# Patient Record
Sex: FEMALE | Race: WHITE | Employment: OTHER | URBAN - METROPOLITAN AREA
[De-identification: names, ages, dates, MRNs, and addresses within clinical notes are randomized per-mention and may not be internally consistent; named-entity substitution may affect disease eponyms.]

---

## 2017-02-09 ENCOUNTER — OFFICE VISIT (OUTPATIENT)
Dept: INTERNAL MEDICINE CLINIC | Age: 57
End: 2017-02-09

## 2017-02-09 VITALS
BODY MASS INDEX: 50.22 KG/M2 | TEMPERATURE: 97.8 F | WEIGHT: 266 LBS | HEIGHT: 61 IN | HEART RATE: 86 BPM | OXYGEN SATURATION: 96 % | SYSTOLIC BLOOD PRESSURE: 125 MMHG | DIASTOLIC BLOOD PRESSURE: 81 MMHG

## 2017-02-09 DIAGNOSIS — E11.9 CONTROLLED TYPE 2 DIABETES MELLITUS WITHOUT COMPLICATION, WITHOUT LONG-TERM CURRENT USE OF INSULIN (HCC): Primary | ICD-10-CM

## 2017-02-09 DIAGNOSIS — Z23 ENCOUNTER FOR IMMUNIZATION: ICD-10-CM

## 2017-02-09 DIAGNOSIS — E78.00 PURE HYPERCHOLESTEROLEMIA: ICD-10-CM

## 2017-02-09 DIAGNOSIS — I10 ESSENTIAL HYPERTENSION, BENIGN: ICD-10-CM

## 2017-02-09 DIAGNOSIS — Z12.11 COLON CANCER SCREENING: ICD-10-CM

## 2017-02-09 DIAGNOSIS — K21.9 GASTROESOPHAGEAL REFLUX DISEASE, ESOPHAGITIS PRESENCE NOT SPECIFIED: ICD-10-CM

## 2017-02-09 RX ORDER — METFORMIN HYDROCHLORIDE 500 MG/1
1000 TABLET ORAL 2 TIMES DAILY WITH MEALS
Qty: 120 TAB | Refills: 6 | Status: SHIPPED | OUTPATIENT
Start: 2017-02-09 | End: 2017-09-13 | Stop reason: SDUPTHER

## 2017-02-09 RX ORDER — OMEPRAZOLE 20 MG/1
20 CAPSULE, DELAYED RELEASE ORAL DAILY
Qty: 30 CAP | Refills: 6 | Status: SHIPPED | OUTPATIENT
Start: 2017-02-09 | End: 2020-04-29

## 2017-02-09 NOTE — PROGRESS NOTES
HISTORY OF PRESENT ILLNESS  Thomas Biswas is a 64 y.o. female. HPI   F/u dm-2 htn hld  Taking metformin 500mg bid instead of tid, did not get enough pills whe rx transferred to new pharmacy  Has not been on a low carb diet  Feels well  No cp sob or sxs of neuropathy  Some fullness in chest after eating sometimes      Patient Active Problem List    Diagnosis Date Noted    Type 2 diabetes mellitus without complication ([facility] 52/89/0280    Hepatic steatosis 04/22/2015    HLD (hyperlipidemia) 06/28/2014    Goiter 06/27/2013    Diabetes mellitus ([facility] 11/03/2011    Type 1 von Willebrand disease ([facility] 02/12/2010    Essential hypertension, benign 02/12/2010    Obesity 02/12/2010    PEGGY (obstructive sleep apnea) 02/12/2010    GERD (gastroesophageal reflux disease) 02/12/2010    Venous insufficiency 02/12/2010    Nephrolithiasis 02/12/2010    Allergic rhinitis 02/12/2010    Asthma 02/12/2010    Carpal tunnel syndrome 02/12/2010    Basal cell carcinoma of nose 02/12/2010    Migraine 02/12/2010     Current Outpatient Prescriptions   Medication Sig Dispense Refill    metFORMIN (GLUCOPHAGE) 500 mg tablet Take 2 Tabs by mouth two (2) times daily (with meals). 120 Tab 6    omeprazole (PRILOSEC) 20 mg capsule Take 1 Cap by mouth daily. 30 Cap 6    pravastatin (PRAVACHOL) 10 mg tablet TAKE ONE (1) TABLET BY MOUTH DAILY 30 Tab 11    triamterene-hydrochlorothiazide (MAXZIDE) 37.5-25 mg per tablet TAKE TWO (2) TABLETS BY MOUTH ONCE DAILY 60 Tab 11    diclofenac EC (VOLTAREN) 75 mg EC tablet Take 1 Tab by mouth two (2) times a day.  30 Tab 0    glucose blood VI test strips (FREESTYLE TEST) strip Freestyle freedom lite test trips  -check fsbs every day  250.00 100 Strip 11    Blood-Glucose Meter monitoring kit Use as directed 1 Kit 0    ALPRAZolam (XANAX) 0.25 mg tablet TAKE  ONE (1)  TABLET(S)  TWICE DAILY AS   NEEDED FORANXIETY 30 Tab 3    albuterol (PROVENTIL HFA, VENTOLIN HFA, PROAIR HFA) 90 mcg/actuation inhaler Take 2 Puffs by inhalation every six (6) hours as needed for Wheezing. 1 Inhaler 0     Allergies   Allergen Reactions    Pcn [Penicillins] Unknown (comments)     Given as a child and can't remember reaction      Lab Results  Component Value Date/Time   Hemoglobin A1c 8.0 01/11/2016 09:29 AM   Hemoglobin A1c 7.6 04/10/2015 09:28 AM   Hemoglobin A1c 6.5 06/27/2014 03:34 PM   Glucose 105 01/11/2016 09:29 AM   Glucose (POC) 112 02/03/2013 07:15 AM   Microalb/Creat ratio (ug/mg creat.) <6.8 05/09/2016 09:55 AM   LDL, calculated 78 01/11/2016 09:29 AM   Creatinine 0.87 01/11/2016 09:29 AM      Lab Results  Component Value Date/Time   Cholesterol, total 171 01/11/2016 09:29 AM   HDL Cholesterol 59 01/11/2016 09:29 AM   LDL, calculated 78 01/11/2016 09:29 AM   Triglyceride 171 01/11/2016 09:29 AM       Lab Results  Component Value Date/Time   GFR est AA 87 01/11/2016 09:29 AM   GFR est non-AA 75 01/11/2016 09:29 AM   Creatinine 0.87 01/11/2016 09:29 AM   BUN 19 01/11/2016 09:29 AM   Sodium 139 01/11/2016 09:29 AM   Potassium 4.2 01/11/2016 09:29 AM   Chloride 92 01/11/2016 09:29 AM   CO2 29 01/11/2016 09:29 AM         ROS    Physical Exam   Constitutional: She appears well-developed and well-nourished. Appears stated age   Cardiovascular: Normal rate, regular rhythm and normal heart sounds. Exam reveals no gallop and no friction rub. No murmur heard. Pulmonary/Chest: Effort normal and breath sounds normal. No respiratory distress. She has no wheezes. Abdominal: Soft. Bowel sounds are normal.   Musculoskeletal: She exhibits no edema. Neurological: She is alert. Psychiatric: She has a normal mood and affect. Nursing note and vitals reviewed. ASSESSMENT and PLAN  Mayco Hernández was seen today for diabetes, cholesterol problem and hypertension.     Diagnoses and all orders for this visit:    Controlled type 2 diabetes mellitus without complication, without long-term current use of insulin (HCC)  -     CBC W/O DIFF  -     METABOLIC PANEL, COMPREHENSIVE  -     HEMOGLOBIN A1C WITH EAG   Plan on increasing metformin to 2 tabs bid   Work on diet   May need to add dpp4 or glp-1 soon-discussed    Essential hypertension, benign  -     CBC W/O DIFF  -     METABOLIC PANEL, COMPREHENSIVE   controlled    Pure hypercholesterolemia  -     CBC W/O DIFF  -     METABOLIC PANEL, COMPREHENSIVE  -     LIPID PANEL   Continue statin    Encounter for immunization  -     Influenza virus vaccine (QUADRIVALENT PRES FREE SYRINGE) IM 3 years and older    Colon cancer screening  -     REFERRAL TO GASTROENTEROLOGY    Gastroesophageal reflux disease, esophagitis presence not specified   Start prilosec 20 mg every day, mild dysphagia sxs    Other orders  -     metFORMIN (GLUCOPHAGE) 500 mg tablet; Take 2 Tabs by mouth two (2) times daily (with meals). -     omeprazole (PRILOSEC) 20 mg capsule; Take 1 Cap by mouth daily. Follow-up Disposition:  Return in about 4 months (around 6/9/2017) for dm-2 htn hld.

## 2017-02-09 NOTE — MR AVS SNAPSHOT
Visit Information Date & Time Provider Department Dept. Phone Encounter #  
 2/9/2017  9:30 AM Zander Armstrong, [address] [facility] [address] [phone] [id number] Follow-up Instructions Return in about 4 months (around 6/9/2017) for dm-2 htn hld. Upcoming Health Maintenance Date Due Pneumococcal 19-64 Medium Risk (1 of 1 - PPSV23) 9/30/1979 PAP AKA CERVICAL CYTOLOGY 9/30/1981 FOBT Q 1 YEAR AGE 50-75 9/30/2010 INFLUENZA AGE 9 TO ADULT 8/1/2016 LIPID PANEL Q1 1/11/2017 HEMOGLOBIN A1C Q6M 2/16/2017 EYE EXAM RETINAL OR DILATED Q1 4/6/2017 FOOT EXAM Q1 5/9/2017 MICROALBUMIN Q1 5/9/2017 BREAST CANCER SCRN MAMMOGRAM 7/28/2017 DTaP/Tdap/Td series (2 - Td) 8/26/2025 Allergies as of 2/9/2017  Review Complete On: 2/9/2017 By: Patience Fischer LPN Severity Noted Reaction Type Reactions Pcn [Penicillins]  02/12/2010    Unknown (comments) Given as a child and can't remember reaction Current Immunizations  Reviewed on 2/4/2013 Name Date Influenza Vaccine 1/7/2014 Influenza Vaccine (Quad) PF  Incomplete Influenza Vaccine PF 12/29/2014 Tdap 8/26/2015 Not reviewed this visit You Were Diagnosed With   
  
 Codes Comments Controlled type 2 diabetes mellitus without complication, without long-term current use of insulin ([facility]    -  Primary ICD-10-CM: E11.9 ICD-9-CM: 250.00 Essential hypertension, benign     ICD-10-CM: I10 
ICD-9-CM: 401.1 Pure hypercholesterolemia     ICD-10-CM: E78.00 ICD-9-CM: 272.0 Encounter for immunization     ICD-10-CM: S62 ICD-9-CM: V03.89 Colon cancer screening     ICD-10-CM: Z12.11 ICD-9-CM: V76.51 Vitals BP Pulse Temp Height(growth percentile) Weight(growth percentile) SpO2  
 125/81 (BP 1 Location: Left arm, BP Patient Position: Sitting) 86 97.8 °F (36.6 °C) (Oral) 5' 1\" (1.549 m) 266 lb (120.7 kg) 96% BMI OB Status Smoking Status 50.26 kg/m2 Hysterectomy Never Smoker BMI and BSA Data Body Mass Index Body Surface Area  
 50.26 kg/m 2 2.28 m 2 Preferred Pharmacy Pharmacy Name Phone [address] [address] AT [place] [address] [phone] Your Updated Medication List  
  
   
This list is accurate as of: 2/9/17 10:18 AM.  Always use your most recent med list.  
  
  
  
  
 albuterol 90 mcg/actuation inhaler Commonly known as:  PROVENTIL HFA, VENTOLIN HFA, PROAIR HFA Take 2 Puffs by inhalation every six (6) hours as needed for Wheezing. ALPRAZolam 0.25 mg tablet Commonly known as:  XANAX  
TAKE  ONE (1)  TABLET(S)  TWICE DAILY AS   NEEDED [employer] Blood-Glucose Meter monitoring kit Use as directed  
  
 diclofenac EC 75 mg EC tablet Commonly known as:  VOLTAREN Take 1 Tab by mouth two (2) times a day. glucose blood VI test strips strip Commonly known as:  FREESTYLE TEST Freestyle freedom lite test trips  -check fsbs every day  250.00  
  
 metFORMIN 500 mg tablet Commonly known as:  GLUCOPHAGE Take 2 Tabs by mouth two (2) times daily (with meals). pravastatin 10 mg tablet Commonly known as:  PRAVACHOL  
TAKE ONE (1) TABLET BY MOUTH DAILY  
  
 triamterene-hydroCHLOROthiazide 37.5-25 mg per tablet Commonly known as:  Anuj Beady TAKE TWO (2) TABLETS BY MOUTH ONCE DAILY Prescriptions Sent to Pharmacy Refills  
 metFORMIN (GLUCOPHAGE) 500 mg tablet 6 Sig: Take 2 Tabs by mouth two (2) times daily (with meals). Class: Normal  
 Pharmacy: [facility] [address] [place] [address] AT [place] [address] Ph #: [phone] Route: Oral  
  
We Performed the Following CBC W/O DIFF [69345 CPT(R)] HEMOGLOBIN A1C WITH EAG [58553 CPT(R)] INFLUENZA VIRUS VAC QUAD,SPLIT,PRESV FREE SYRINGE 3/> YRS IM [id number] CPT(R)] LIPID PANEL [24484 CPT(R)] METABOLIC PANEL, COMPREHENSIVE [11272 CPT(R)] REFERRAL TO GASTROENTEROLOGY [[id number] Custom] Comments:  
 Please evaluate patient for screening colonoscopy Follow-up Instructions Return in about 4 months (around 6/9/2017) for dm-2 htn hld. Referral Information Referral ID Referred By Referred To  
  
 [id number] Maida SUAZO[address]   
   [address] [facility] [address], [address] Visits Status Start Date End Date 1 New Request 2/9/17 2/9/18 If your referral has a status of pending review or denied, additional information will be sent to support the outcome of this decision. Introducing [facility]! Dear Soni Daly: 
Thank you for requesting a [employer] account. Our records indicate that you have previously registered for a Medicast account but its currently inactive. Please call our Medicast support line at [phone]. Additional Information If you have questions, please visit the Frequently Asked Questions section of the [employer]ast website at [email or web address] Remember, Medicast is NOT to be used for urgent needs. For medical emergencies, dial 911. Now available from your iPhone and Android! Please provide this summary of care documentation to your next provider. Your primary care clinician is listed as Bert SUAZO. If you have any questions after today's visit, please call [phone].

## 2017-02-10 LAB
ALBUMIN SERPL-MCNC: 4.1 G/DL (ref 3.5–5.5)
ALBUMIN/GLOB SERPL: 1.5 {RATIO} (ref 1.1–2.5)
ALP SERPL-CCNC: 71 IU/L (ref 39–117)
ALT SERPL-CCNC: 25 IU/L (ref 0–32)
AST SERPL-CCNC: 26 IU/L (ref 0–40)
BILIRUB SERPL-MCNC: 0.4 MG/DL (ref 0–1.2)
BUN SERPL-MCNC: 13 MG/DL (ref 6–24)
BUN/CREAT SERPL: 16 (ref 9–23)
CALCIUM SERPL-MCNC: 9.3 MG/DL (ref 8.7–10.2)
CHLORIDE SERPL-SCNC: 93 MMOL/L (ref 96–106)
CHOLEST SERPL-MCNC: 158 MG/DL (ref 100–199)
CO2 SERPL-SCNC: 30 MMOL/L (ref 18–29)
CREAT SERPL-MCNC: 0.82 MG/DL (ref 0.57–1)
ERYTHROCYTE [DISTWIDTH] IN BLOOD BY AUTOMATED COUNT: 13.7 % (ref 12.3–15.4)
EST. AVERAGE GLUCOSE BLD GHB EST-MCNC: 186 MG/DL
GLOBULIN SER CALC-MCNC: 2.7 G/DL (ref 1.5–4.5)
GLUCOSE SERPL-MCNC: 146 MG/DL (ref 65–99)
HBA1C MFR BLD: 8.1 % (ref 4.8–5.6)
HCT VFR BLD AUTO: 45.8 % (ref 34–46.6)
HDLC SERPL-MCNC: 45 MG/DL
HGB BLD-MCNC: 14.5 G/DL (ref 11.1–15.9)
LDLC SERPL CALC-MCNC: 79 MG/DL (ref 0–99)
MCH RBC QN AUTO: 25.4 PG (ref 26.6–33)
MCHC RBC AUTO-ENTMCNC: 31.7 G/DL (ref 31.5–35.7)
MCV RBC AUTO: 80 FL (ref 79–97)
PLATELET # BLD AUTO: 272 X10E3/UL (ref 150–379)
POTASSIUM SERPL-SCNC: 3.8 MMOL/L (ref 3.5–5.2)
PROT SERPL-MCNC: 6.8 G/DL (ref 6–8.5)
RBC # BLD AUTO: 5.7 X10E6/UL (ref 3.77–5.28)
SODIUM SERPL-SCNC: 140 MMOL/L (ref 134–144)
TRIGL SERPL-MCNC: 170 MG/DL (ref 0–149)
VLDLC SERPL CALC-MCNC: 34 MG/DL (ref 5–40)
WBC # BLD AUTO: 6.9 X10E3/UL (ref 3.4–10.8)

## 2017-03-03 ENCOUNTER — HOSPITAL ENCOUNTER (EMERGENCY)
Age: 57
Discharge: HOME | End: 2017-03-03
Attending: FAMILY MEDICINE

## 2017-03-03 VITALS
HEIGHT: 61 IN | TEMPERATURE: 98.7 F | DIASTOLIC BLOOD PRESSURE: 79 MMHG | HEART RATE: 88 BPM | BODY MASS INDEX: 50.22 KG/M2 | OXYGEN SATURATION: 97 % | RESPIRATION RATE: 20 BRPM | SYSTOLIC BLOOD PRESSURE: 164 MMHG | WEIGHT: 266 LBS

## 2017-03-03 DIAGNOSIS — J20.9 ACUTE BRONCHITIS, UNSPECIFIED ORGANISM: Primary | ICD-10-CM

## 2017-03-03 RX ORDER — AZITHROMYCIN 250 MG/1
TABLET, FILM COATED ORAL
Qty: 6 TAB | Refills: 0 | Status: SHIPPED | OUTPATIENT
Start: 2017-03-03 | End: 2018-01-09 | Stop reason: ALTCHOICE

## 2017-03-03 RX ORDER — CODEINE PHOSPHATE AND GUAIFENESIN 10; 100 MG/5ML; MG/5ML
5 SOLUTION ORAL
Qty: 120 ML | Refills: 0 | Status: SHIPPED | OUTPATIENT
Start: 2017-03-03 | End: 2017-06-09

## 2017-03-03 RX ORDER — ALBUTEROL SULFATE 90 UG/1
2 AEROSOL, METERED RESPIRATORY (INHALATION)
Qty: 1 INHALER | Refills: 0 | Status: SHIPPED | OUTPATIENT
Start: 2017-03-03 | End: 2019-02-22

## 2017-03-03 NOTE — DISCHARGE INSTRUCTIONS
Bronchitis: Care Instructions  Your Care Instructions    Bronchitis is inflammation of the bronchial tubes, which carry air to the lungs. The tubes swell and produce mucus, or phlegm. The mucus and inflamed bronchial tubes make you cough. You may have trouble breathing. Most cases of bronchitis are caused by viruses like those that cause colds. Antibiotics usually do not help and they may be harmful. Bronchitis usually develops rapidly and lasts about 2 to 3 weeks in otherwise healthy people. Follow-up care is a key part of your treatment and safety. Be sure to make and go to all appointments, and call your doctor if you are having problems. It's also a good idea to know your test results and keep a list of the medicines you take. How can you care for yourself at home? · Take all medicines exactly as prescribed. Call your doctor if you think you are having a problem with your medicine. · Get some extra rest.  · Take an over-the-counter pain medicine, such as acetaminophen (Tylenol), ibuprofen (Advil, Motrin), or naproxen (Aleve) to reduce fever and relieve body aches. Read and follow all instructions on the label. · Do not take two or more pain medicines at the same time unless the doctor told you to. Many pain medicines have acetaminophen, which is Tylenol. Too much acetaminophen (Tylenol) can be harmful. · Take an over-the-counter cough medicine that contains dextromethorphan to help quiet a dry, hacking cough so that you can sleep. Avoid cough medicines that have more than one active ingredient. Read and follow all instructions on the label. · Breathe moist air from a humidifier, hot shower, or sink filled with hot water. The heat and moisture will thin mucus so you can cough it out. · Do not smoke. Smoking can make bronchitis worse. If you need help quitting, talk to your doctor about stop-smoking programs and medicines. These can increase your chances of quitting for good.   When should you call for help? Call 911 anytime you think you may need emergency care. For example, call if:  · You have severe trouble breathing. Call your doctor now or seek immediate medical care if:  · You have new or worse trouble breathing. · You cough up dark brown or bloody mucus (sputum). · You have a new or higher fever. · You have a new rash. Watch closely for changes in your health, and be sure to contact your doctor if:  · You cough more deeply or more often, especially if you notice more mucus or a change in the color of your mucus. · You are not getting better as expected. Where can you learn more? Go to http://dillon-champ.info/. Enter H333 in the search box to learn more about \"Bronchitis: Care Instructions. \"  Current as of: May 23, 2016  Content Version: 11.1  © 3624-4399 [employer]. Care instructions adapted under license by [employer] (which disclaims liability or warranty for this information). If you have questions about a medical condition or this instruction, always ask your healthcare professional. [address] any warranty or liability for your use of this information.

## 2017-03-03 NOTE — UC PROVIDER NOTE
Patient is a 64 y.o. female presenting with cough and nasal congestion. The history is provided by the patient. Cough   This is a new problem. The current episode started more than 1 week ago. The problem occurs every few minutes. The problem has been gradually worsening. The cough is productive of sputum. There has been no fever. Pertinent negatives include no chest pain, no chills, no sweats and no headaches. She has tried nothing for the symptoms. She is not a smoker. Nasal Congestion   This is a new problem. The current episode started more than 1 week ago. The problem occurs daily. The problem has not changed since onset. Pertinent negatives include no chest pain and no headaches. Nothing aggravates the symptoms. Nothing relieves the symptoms. Past Medical History:   Diagnosis Date    Arrhythmia     heart murmur    Chronic pain     Contact dermatitis and other eczema, due to unspecified cause     Diabetes (HCC)     GERD (gastroesophageal reflux disease)     Hypertension     Joint pain     & muscle aches    Sleep apnea     zapata not use c-pap        Past Surgical History:   Procedure Laterality Date    BREAST SURGERY PROCEDURE UNLISTED  1984    breast reduction    CHEST SURGERY PROCEDURE UNLISTED  1/29/13     THYROIDECTOMY Retrosternal and Retromandibular!  HX CARPAL TUNNEL RELEASE      HX HYSTERECTOMY  2002?  HX ORTHOPAEDIC  2008, 2009    carpal tunnel    HX ORTHOPAEDIC  2010    torn meniscus    HX THYROIDECTOMY  01/2013    right side    MO MOHS SURG, ADDL BLOCK      basal cell on nose         Family History   Problem Relation Age of Onset    Hypertension Father     Stroke Father         Social History     Social History    Marital status:      Spouse name: N/A    Number of children: N/A    Years of education: N/A     Occupational History    Not on file.      Social History Main Topics    Smoking status: Never Smoker    Smokeless tobacco: Never Used    Alcohol use 1.0 oz/week     1 Glasses of wine, 1 Cans of beer per week      Comment: rarely, 3x a month    Drug use: No    Sexual activity: Not on file     Other Topics Concern    Not on file     Social History Narrative                ALLERGIES: Pcn [penicillins]    Review of Systems   Constitutional: Negative for chills. Respiratory: Positive for cough. Cardiovascular: Negative for chest pain. Neurological: Negative for headaches. Vitals:    03/03/17 1440   BP: 164/79   Pulse: 88   Resp: 20   Temp: 98.7 °F (37.1 °C)   SpO2: 97%   Weight: 120.7 kg (266 lb)   Height: 5' 1\" (1.549 m)       Physical Exam   Constitutional: She is oriented to person, place, and time. She appears well-developed and well-nourished. HENT:   Right Ear: External ear normal.   Left Ear: External ear normal.   Eyes: EOM are normal.   Neck: Normal range of motion. Neck supple. No JVD present. No tracheal deviation present. No thyromegaly present. Cardiovascular: Normal rate, regular rhythm and normal heart sounds. Pulmonary/Chest: Effort normal and breath sounds normal.   Lymphadenopathy:     She has no cervical adenopathy. Neurological: She is alert and oriented to person, place, and time. Skin: Skin is warm and dry. Psychiatric: She has a normal mood and affect. Her behavior is normal. Judgment and thought content normal.   Nursing note and vitals reviewed. MDM     Differential Diagnosis; Clinical Impression; Plan:     CLINICAL IMPRESSION:  Acute bronchitis, unspecified organism  (primary encounter diagnosis)    Plan:  1. Robitussin AC  2. Zithromax  3. Albuterol MDI  Risk of Significant Complications, Morbidity, and/or Mortality:   Presenting problems: Moderate  Diagnostic procedures: Moderate  Management options:   Moderate  Progress:   Patient progress:  Stable      Procedures

## 2017-06-09 ENCOUNTER — OFFICE VISIT (OUTPATIENT)
Dept: INTERNAL MEDICINE CLINIC | Age: 57
End: 2017-06-09

## 2017-06-09 VITALS
TEMPERATURE: 97.1 F | SYSTOLIC BLOOD PRESSURE: 122 MMHG | DIASTOLIC BLOOD PRESSURE: 79 MMHG | HEIGHT: 61 IN | HEART RATE: 81 BPM | BODY MASS INDEX: 49.61 KG/M2 | OXYGEN SATURATION: 94 % | WEIGHT: 262.8 LBS | RESPIRATION RATE: 16 BRPM

## 2017-06-09 DIAGNOSIS — E11.9 TYPE 2 DIABETES MELLITUS WITHOUT COMPLICATION, WITHOUT LONG-TERM CURRENT USE OF INSULIN (HCC): Primary | ICD-10-CM

## 2017-06-09 DIAGNOSIS — Z23 ENCOUNTER FOR IMMUNIZATION: ICD-10-CM

## 2017-06-09 LAB — HBA1C MFR BLD HPLC: 7.3 % (ref 4.8–5.6)

## 2017-06-09 RX ORDER — CLOBETASOL PROPIONATE 0.5 MG/G
AEROSOL, FOAM TOPICAL
Refills: 3 | COMMUNITY
Start: 2017-05-12 | End: 2020-04-29

## 2017-06-09 NOTE — MR AVS SNAPSHOT
Visit Information Date & Time Provider Department Dept. Phone Encounter #  
 6/9/2017  8:45 AM Lillie Henning, [address] [facility] [address] [phone] [id number] Follow-up Instructions Return in about 4 months (around 10/9/2017) for dm-2 htn hld. Upcoming Health Maintenance Date Due Pneumococcal 19-64 Medium Risk (1 of 1 - PPSV23) 9/30/1979 PAP AKA CERVICAL CYTOLOGY 9/30/1981 FOBT Q 1 YEAR AGE 50-75 9/30/2010 EYE EXAM RETINAL OR DILATED Q1 4/6/2017 FOOT EXAM Q1 5/9/2017 MICROALBUMIN Q1 5/9/2017 BREAST CANCER SCRN MAMMOGRAM 7/28/2017 INFLUENZA AGE 9 TO ADULT 8/1/2017 HEMOGLOBIN A1C Q6M 8/9/2017 LIPID PANEL Q1 2/9/2018 DTaP/Tdap/Td series (2 - Td) 8/26/2025 Allergies as of 6/9/2017  Review Complete On: 6/9/2017 By: Favian Crocker LPN Severity Noted Reaction Type Reactions Pcn [Penicillins]  02/12/2010    Unknown (comments) Given as a child and can't remember reaction Current Immunizations  Reviewed on 2/4/2013 Name Date Influenza Vaccine 1/7/2014 Influenza Vaccine (Quad) PF 2/9/2017 Influenza Vaccine PF 12/29/2014 Pneumococcal Polysaccharide (PPSV-23)  Incomplete Tdap 8/26/2015 Not reviewed this visit You Were Diagnosed With   
  
 Codes Comments Type 2 diabetes mellitus without complication, without long-term current use of insulin (HCC)    -  Primary ICD-10-CM: E11.9 ICD-9-CM: 250.00 Encounter for immunization     ICD-10-CM: T83 ICD-9-CM: V03.89 Vitals BP Pulse Temp Resp Height(growth percentile) Weight(growth percentile) 122/79 (BP 1 Location: Left arm, BP Patient Position: Sitting) 81 97.1 °F (36.2 °C) (Oral) 16 5' 1\" (1.549 m) 262 lb 12.8 oz (119.2 kg) SpO2 BMI OB Status Smoking Status 94% 49.66 kg/m2 Hysterectomy Never Smoker BMI and BSA Data Body Mass Index Body Surface Area  
 49.66 kg/m 2 2.27 m 2 Preferred Pharmacy Pharmacy Name Phone [address] [place] [address] [phone] Your Updated Medication List  
  
   
This list is accurate as of: 6/9/17  9:28 AM.  Always use your most recent med list.  
  
  
  
  
 * albuterol 90 mcg/actuation inhaler Commonly known as:  PROVENTIL HFA, VENTOLIN HFA, PROAIR HFA Take 2 Puffs by inhalation every six (6) hours as needed for Wheezing. * albuterol 90 mcg/actuation inhaler Commonly known as:  PROVENTIL HFA, VENTOLIN HFA, PROAIR HFA Take 2 Puffs by inhalation every four (4) hours as needed for Wheezing. ALPRAZolam 0.25 mg tablet Commonly known as:  XANAX  
TAKE  ONE (1)  TABLET(S)  TWICE DAILY AS   NEEDED FORANXIETY  
  
 azithromycin 250 mg tablet Commonly known as:  Edy Wilkinsonson Take 2 now, then 1 daily for 4 days Blood-Glucose Meter monitoring kit Use as directed  
  
 clobetasol 0.05 % topical foam  
Commonly known as:  OLUX  
BESSY AA PRN UTD  
  
 glucose blood VI test strips strip Commonly known as:  FREESTYLE TEST Freestyle freedom lite test trips  -check fsbs every day  250.00  
  
 metFORMIN 500 mg tablet Commonly known as:  GLUCOPHAGE Take 2 Tabs by mouth two (2) times daily (with meals). omeprazole 20 mg capsule Commonly known as:  PRILOSEC Take 1 Cap by mouth daily. pravastatin 10 mg tablet Commonly known as:  PRAVACHOL  
TAKE ONE (1) TABLET BY MOUTH DAILY  
  
 triamterene-hydroCHLOROthiazide 37.5-25 mg per tablet Commonly known as:  Kaykay Penny TAKE TWO (2) TABLETS BY MOUTH ONCE DAILY * Notice: This list has 2 medication(s) that are the same as other medications prescribed for you. Read the directions carefully, and ask your doctor or other care provider to review them with you. We Performed the Following AMB POC HEMOGLOBIN A1C [77880 CPT(R)] MICROALBUMIN, UR, RAND W/ MICROALBUMIN/CREA RATIO [id number] CPT(R)] PNEUMOCOCCAL POLYSACCHARIDE VACCINE, 23-VALENT, ADULT OR IMMUNOSUPPRESSED PT DOSE, [89088 CPT(R)] Follow-up Instructions Return in about 4 months (around 10/9/2017) for dm-2 htn hld. Introducing [facility] & HEALTH SERVICES! Dear Deborah Mccullough: 
Thank you for requesting a Share Your [employer] account. Our records indicate that you have previously registered for a Share Your Brain account but its currently inactive. Please call our Share Your Brain support line at [phone]. Additional Information If you have questions, please visit the Frequently Asked Questions section of the Share Your Brain website at [email or web address] Remember, Share Your Brain is NOT to be used for urgent needs. For medical emergencies, dial 911. Now available from your iPhone and Android! Please provide this summary of care documentation to your next provider. Your primary care clinician is listed as Staci SUAZO. If you have any questions after today's visit, please call [phone].

## 2017-06-09 NOTE — PROGRESS NOTES
Chief Complaint   Patient presents with    Diabetes     4 month follow up . Pt fasting    Hypertension    Cholesterol Problem     Reviewed record In preparation for visit and have obtained necessary documentation    1. Have you been to the ER, urgent care clinic since your last visit? Hospitalized since your last visit? NO    2. Have you seen or consulted any other health care providers outside of the [facility] [place] since your last visit? Include any pap smears or colon screening. NO    Patient does not have advance directive on file and has received paperwork.

## 2017-06-09 NOTE — PROGRESS NOTES
HISTORY OF PRESENT ILLNESS  Shaina Chatman is a 64 y.o. female. HPI     F/u DM-2 HTN HLD  Last a1c was 8.1, metformin was increased to 2 bid  See ophthalmologist Dr Rojelio Wade at [facility] for eye exams  Had to get screening colonoscopy rescheduled due to flu like sxs  noty on aspirin due to Ilean Mings  No fsbs at home but has glucometer  Has not been strict with diet last 3 weeks  No ss of cp sob or neuropathy in feet  Patient Active Problem List    Diagnosis Date Noted    Type 2 diabetes mellitus without complication ([facility] [address] 88/50/5951    Hepatic steatosis 04/22/2015    HLD (hyperlipidemia) 06/28/2014    Goiter 06/27/2013    Diabetes mellitus ([facility] [address] 11/03/2011    Type 1 von Willebrand disease ([facility] [address] 02/12/2010    Essential hypertension, benign 02/12/2010    Obesity 02/12/2010    PEGGY (obstructive sleep apnea) 02/12/2010    GERD (gastroesophageal reflux disease) 02/12/2010    Venous insufficiency 02/12/2010    Nephrolithiasis 02/12/2010    Allergic rhinitis 02/12/2010    Asthma 02/12/2010    Carpal tunnel syndrome 02/12/2010    Basal cell carcinoma of nose 02/12/2010    Migraine 02/12/2010     Current Outpatient Prescriptions   Medication Sig Dispense Refill    clobetasol (OLUX) 0.05 % topical foam BESSY AA PRN UTD  3    metFORMIN (GLUCOPHAGE) 500 mg tablet Take 2 Tabs by mouth two (2) times daily (with meals). 120 Tab 6    omeprazole (PRILOSEC) 20 mg capsule Take 1 Cap by mouth daily. 30 Cap 6    pravastatin (PRAVACHOL) 10 mg tablet TAKE ONE (1) TABLET BY MOUTH DAILY 30 Tab 11    triamterene-hydrochlorothiazide (MAXZIDE) 37.5-25 mg per tablet TAKE TWO (2) TABLETS BY MOUTH ONCE DAILY 60 Tab 11    Blood-Glucose Meter monitoring kit Use as directed 1 Kit 0    albuterol (PROVENTIL HFA, VENTOLIN HFA, PROAIR HFA) 90 mcg/actuation inhaler Take 2 Puffs by inhalation every four (4) hours as needed for Wheezing.  1 Inhaler 0    azithromycin (ZITHROMAX Z-ESE) 250 mg tablet Take 2 now, then 1 daily for 4 days 6 Tab 0    ALPRAZolam (XANAX) 0.25 mg tablet TAKE  ONE (1)  TABLET(S)  TWICE DAILY AS   NEEDED FORANXIETY 30 Tab 3    glucose blood VI test strips (FREESTYLE TEST) strip Freestyle freedom lite test trips  -check fsbs every day  250.00 100 Strip 11    albuterol (PROVENTIL HFA, VENTOLIN HFA, PROAIR HFA) 90 mcg/actuation inhaler Take 2 Puffs by inhalation every six (6) hours as needed for Wheezing. 1 Inhaler 0     Allergies   Allergen Reactions    Pcn [Penicillins] Unknown (comments)     Given as a child and can't remember reaction      Lab Results  Component Value Date/Time   Hemoglobin A1c 8.1 02/09/2017 10:33 AM   Hemoglobin A1c 8.0 01/11/2016 09:29 AM   Hemoglobin A1c 7.6 04/10/2015 09:28 AM   Glucose 146 02/09/2017 10:33 AM   Glucose (POC) 112 02/03/2013 07:15 AM   Microalb/Creat ratio (ug/mg creat.) <6.8 05/09/2016 09:55 AM   LDL, calculated 79 02/09/2017 10:33 AM   Creatinine 0.82 02/09/2017 10:33 AM      Lab Results  Component Value Date/Time   Cholesterol, total 158 02/09/2017 10:33 AM   HDL Cholesterol 45 02/09/2017 10:33 AM   LDL, calculated 79 02/09/2017 10:33 AM   Triglyceride 170 02/09/2017 10:33 AM       Lab Results  Component Value Date/Time   GFR est AA 93 02/09/2017 10:33 AM   GFR est non-AA 80 02/09/2017 10:33 AM   Creatinine 0.82 02/09/2017 10:33 AM   BUN 13 02/09/2017 10:33 AM   Sodium 140 02/09/2017 10:33 AM   Potassium 3.8 02/09/2017 10:33 AM   Chloride 93 02/09/2017 10:33 AM   CO2 30 02/09/2017 10:33 AM         ROS    Physical Exam   Constitutional: She appears well-developed and well-nourished. Appears stated age   Cardiovascular: Normal rate, regular rhythm and normal heart sounds. Exam reveals no gallop and no friction rub. No murmur heard. Pulmonary/Chest: Effort normal and breath sounds normal. No respiratory distress. She has no wheezes. Abdominal: Soft. Bowel sounds are normal.   Musculoskeletal: She exhibits no edema. Neurological: She is alert.    Psychiatric: She has a normal mood and affect. Nursing note and vitals reviewed. ASSESSMENT and PLAN  Per Ronquillo was seen today for diabetes, hypertension and cholesterol problem. Diagnoses and all orders for this visit:    Type 2 diabetes mellitus without complication, without long-term current use of insulin (HCC)  -     AMB POC HEMOGLOBIN A1C 7.3  -     MICROALBUMIN, UR, RAND W/ MICROALBUMIN/CREA RATIO   Pt declines DPP4 of SGLT2, prefers to make further diet restrictions   Advised [facility] exam at [facility]  HTN   controlled  Encounter for immunization  -     Pneumococcal polysaccharide vaccine, 23-valent, adult or immunosuppressed pt dose      Follow-up Disposition:  Return in about 4 months (around 10/9/2017) for dm-2 htn hld.

## 2017-06-10 LAB
ALBUMIN/CREAT UR: 3.9 MG/G CREAT (ref 0–30)
CREAT UR-MCNC: 162.1 MG/DL
MICROALBUMIN UR-MCNC: 6.3 UG/ML

## 2018-01-09 ENCOUNTER — OFFICE VISIT (OUTPATIENT)
Dept: INTERNAL MEDICINE CLINIC | Age: 58
End: 2018-01-09

## 2018-01-09 ENCOUNTER — DOCUMENTATION ONLY (OUTPATIENT)
Dept: INTERNAL MEDICINE CLINIC | Age: 58
End: 2018-01-09

## 2018-01-09 VITALS
BODY MASS INDEX: 49.47 KG/M2 | TEMPERATURE: 98.1 F | HEART RATE: 80 BPM | WEIGHT: 262 LBS | DIASTOLIC BLOOD PRESSURE: 81 MMHG | SYSTOLIC BLOOD PRESSURE: 126 MMHG | OXYGEN SATURATION: 96 % | HEIGHT: 61 IN

## 2018-01-09 DIAGNOSIS — E78.00 PURE HYPERCHOLESTEROLEMIA: ICD-10-CM

## 2018-01-09 DIAGNOSIS — I10 ESSENTIAL HYPERTENSION, BENIGN: ICD-10-CM

## 2018-01-09 DIAGNOSIS — Z12.11 COLON CANCER SCREENING: ICD-10-CM

## 2018-01-09 DIAGNOSIS — Z23 ENCOUNTER FOR IMMUNIZATION: ICD-10-CM

## 2018-01-09 DIAGNOSIS — E11.9 TYPE 2 DIABETES MELLITUS WITHOUT COMPLICATION, WITHOUT LONG-TERM CURRENT USE OF INSULIN (HCC): Primary | ICD-10-CM

## 2018-01-09 DIAGNOSIS — F51.01 PRIMARY INSOMNIA: ICD-10-CM

## 2018-01-09 DIAGNOSIS — K76.0 HEPATIC STEATOSIS: ICD-10-CM

## 2018-01-09 LAB — HBA1C MFR BLD HPLC: 7.5 % (ref 4.8–5.6)

## 2018-01-09 RX ORDER — ALPRAZOLAM 0.25 MG/1
TABLET ORAL
Qty: 30 TAB | Refills: 3 | Status: SHIPPED | OUTPATIENT
Start: 2018-01-09 | End: 2019-02-22 | Stop reason: SDUPTHER

## 2018-01-09 RX ORDER — METFORMIN HYDROCHLORIDE 500 MG/1
2000 TABLET, EXTENDED RELEASE ORAL
Qty: 120 TAB | Refills: 6 | Status: SHIPPED | OUTPATIENT
Start: 2018-01-09 | End: 2018-09-25 | Stop reason: SDUPTHER

## 2018-01-09 NOTE — PROGRESS NOTES
Pt was seen at office on 1/9/18 after appt, pt states that she needed to speak with nurse to get scripts printed, I advised pt that I could ask 's  nurse about prescriptions to see if they could print them for her. Nurse informed me that both scripts could be sent to pharmacy and pt stated that that was ok. Before leaving pt seemed very upset that she could not go back and talk with Dr. Yayo Carroll because he was in with another pt, pt then states that she will be calling office to speak with Dr. Stevie Madera.

## 2018-01-09 NOTE — MR AVS SNAPSHOT
Visit Information Date & Time Provider Department Dept. Phone Encounter #  
 1/9/2018  3:15 PM Tamica Malloy, [address] [place] [address],[address] [phone] [id number] Follow-up Instructions Return in about 4 months (around 5/9/2018) for dm-2 htn hld. Upcoming Health Maintenance Date Due FOBT Q 1 YEAR AGE 50-75 9/30/2010 EYE EXAM RETINAL OR DILATED Q1 4/6/2017 FOOT EXAM Q1 5/9/2017 Influenza Age 5 to Adult 8/1/2017 LIPID PANEL Q1 2/9/2018 MICROALBUMIN Q1 6/9/2018 HEMOGLOBIN A1C Q6M 7/9/2018 BREAST CANCER SCRN MAMMOGRAM 8/17/2018 PAP AKA CERVICAL CYTOLOGY 6/9/2020 DTaP/Tdap/Td series (2 - Td) 8/26/2025 Allergies as of 1/9/2018  Review Complete On: 1/9/2018 By: Tamica Malloy MD  
  
 Severity Noted Reaction Type Reactions Pcn [Penicillins]  02/12/2010    Unknown (comments) Given as a child and can't remember reaction Current Immunizations  Reviewed on 6/9/2017 Name Date Influenza Vaccine 1/7/2014 Influenza Vaccine (Quad) PF  Incomplete, 2/9/2017 Influenza Vaccine PF 12/29/2014 Pneumococcal Polysaccharide (PPSV-23) 6/9/2017 Tdap 8/26/2015 Not reviewed this visit You Were Diagnosed With   
  
 Codes Comments Type 2 diabetes mellitus without complication, without long-term current use of insulin (HCC)    -  Primary ICD-10-CM: E11.9 ICD-9-CM: 250.00 Essential hypertension, benign     ICD-10-CM: I10 
ICD-9-CM: 401.1 Pure hypercholesterolemia     ICD-10-CM: E78.00 ICD-9-CM: 272.0 Hepatic steatosis     ICD-10-CM: K76.0 ICD-9-CM: 571.8 Encounter for immunization     ICD-10-CM: A41 ICD-9-CM: V03.89 Primary insomnia     ICD-10-CM: F51.01 
ICD-9-CM: 307.42 Colon cancer screening     ICD-10-CM: Z12.11 ICD-9-CM: V76.51 Vitals  BP Pulse Temp Height(growth percentile) Weight(growth percentile) SpO2  
 126/81 (BP 1 Location: Left arm, BP Patient Position: Sitting) 80 98.1 °F (36.7 °C) (Oral) 5' 1\" (1.549 m) 262 lb (118.8 kg) 96% BMI OB Status Smoking Status 49.5 kg/m2 Hysterectomy Never Smoker BMI and BSA Data Body Mass Index Body Surface Area 49.5 kg/m 2 2.26 m 2 Preferred Pharmacy Pharmacy Name Phone [address] [facility] [address] [place] [address], [facility] [address] [place] [address] [phone] Your Updated Medication List  
  
   
This list is accurate as of: 1/9/18  4:18 PM.  Always use your most recent med list.  
  
  
  
  
 * albuterol 90 mcg/actuation inhaler Commonly known as:  PROVENTIL HFA, VENTOLIN HFA, PROAIR HFA Take 2 Puffs by inhalation every six (6) hours as needed for Wheezing. * albuterol 90 mcg/actuation inhaler Commonly known as:  PROVENTIL HFA, VENTOLIN HFA, PROAIR HFA Take 2 Puffs by inhalation every four (4) hours as needed for Wheezing. ALPRAZolam 0.25 mg tablet Commonly known as:  XANAX  
TAKE  ONE (1)  TABLET(S)  TWICE DAILY AS   NEEDED Ted Gals Blood-Glucose Meter monitoring kit Use as directed  
  
 clobetasol 0.05 % topical foam  
Commonly known as:  OLUX  
BESSY AA PRN UTD  
  
 glucose blood VI test strips strip Commonly known as:  FREESTYLE TEST Freestyle freedom lite test trips  -check fsbs every day  250.00  
  
 metFORMIN  mg tablet Commonly known as:  GLUCOPHAGE XR Take 4 Tabs by mouth daily (with dinner). omeprazole 20 mg capsule Commonly known as:  PRILOSEC Take 1 Cap by mouth daily. pravastatin 10 mg tablet Commonly known as:  PRAVACHOL  
TAKE 1 TABLET BY MOUTH EVERY DAY  
  
 triamterene-hydroCHLOROthiazide 37.5-25 mg per tablet Commonly known as:  Jennie Crooked TAKE 2 TABLETS BY MOUTH EVERY DAY  
  
 * Notice: This list has 2 medication(s) that are the same as other medications prescribed for you. Read the directions carefully, and ask your doctor or other care provider to review them with you. Prescriptions Printed Refills ALPRAZolam (XANAX) 0.25 mg tablet 3 Sig: TAKE  ONE (1)  TABLET(S)  TWICE DAILY AS   NEEDED Markel Calixtobhumika Class: Print Prescriptions Sent to Pharmacy Refills  
 metFORMIN ER (GLUCOPHAGE XR) 500 mg tablet 6 Sig: Take 4 Tabs by mouth daily (with dinner). Class: Normal  
 Pharmacy: [facility] [address] [place] [address], [facility] [address] #: [phone] Route: Oral  
  
We Performed the Following AMB POC HEMOGLOBIN A1C [06343 CPT(R)] CBC W/O DIFF [72450 CPT(R)] CBC W/O DIFF [61060 CPT(R)] INFLUENZA VIRUS VAC QUAD,SPLIT,PRESV FREE SYRINGE IM [id number] CPT(R)] LIPID PANEL [67970 CPT(R)] LIPID PANEL [39156 CPT(R)] METABOLIC PANEL, COMPREHENSIVE [07263 CPT(R)] METABOLIC PANEL, COMPREHENSIVE [67711 CPT(R)] [place] IMMUNIZ ADMIN,1 SINGLE/COMB VAC/TOXOID [id number] CPT(R)] REFERRAL TO GASTROENTEROLOGY [SNB35 Custom] Follow-up Instructions Return in about 4 months (around 5/9/2018) for dm-2 htn hld. Referral Information Referral ID Referred By Referred To  
  
 [id number] [place], [address]   
   305 [facility] [address] [place], 200 [facility] Visits Status Start Date End Date 1 New Request 1/9/18 1/9/19 If your referral has a status of pending review or denied, additional information will be sent to support the outcome of this decision. Introducing [facility]! Dear Hermila Schafer: 
Thank you for requesting a [employer] account. Our records indicate that you have previously registered for a [employer] account but its currently inactive. Please call our [employer] support line at [phone]. Additional Information If you have questions, please visit the Frequently Asked Questions section of the [employer] website at [email or web address] Remember, [employer] is NOT to be used for urgent needs.  For medical emergencies, dial 911. Now available from your iPhone and Android! Please provide this summary of care documentation to your next provider. Your primary care clinician is listed as Steve SUAZO. If you have any questions after today's visit, please call [phone].

## 2018-01-09 NOTE — PROGRESS NOTES
HISTORY OF PRESENT ILLNESS  Alee Linda is a 62 y.o. female. HPI      F/u DM-2 HTN HLD  Last a1c was 7.3 improved from 8.1    See ophthalmologist Dr Sarah Armas at [facility] for [facility] exams  Had to get screening colonoscopy rescheduled due to flu like sxs  noty on aspirin due to Jamilah Whitfield  No fsbs at home but has glucometer  Has not been strict with diet last 3 weeks  No ss of cp sob or neuropathy in feet    Patient Active Problem List    Diagnosis Date Noted    Type 2 diabetes mellitus without complication ([facility] [address] 03/59/7935    Hepatic steatosis 04/22/2015    HLD (hyperlipidemia) 06/28/2014    Goiter 06/27/2013    Diabetes mellitus ([facility] [address] 11/03/2011    Type 1 von Willebrand disease ([facility] [address] 02/12/2010    Essential hypertension, benign 02/12/2010    Obesity 02/12/2010    PEGGY (obstructive sleep apnea) 02/12/2010    GERD (gastroesophageal reflux disease) 02/12/2010    Venous insufficiency 02/12/2010    Nephrolithiasis 02/12/2010    Allergic rhinitis 02/12/2010    Asthma 02/12/2010    Carpal tunnel syndrome 02/12/2010    Basal cell carcinoma of nose 02/12/2010    Migraine 02/12/2010     Current Outpatient Prescriptions   Medication Sig Dispense Refill    triamterene-hydroCHLOROthiazide (MAXZIDE) 37.5-25 mg per tablet TAKE 2 TABLETS BY MOUTH EVERY DAY 60 Tab 11    pravastatin (PRAVACHOL) 10 mg tablet TAKE 1 TABLET BY MOUTH EVERY DAY 30 Tab 11    metFORMIN (GLUCOPHAGE) 500 mg tablet TAKE 2 TABLETS BY MOUTH TWICE DAILY WITH MEALS 120 Tab 11    clobetasol (OLUX) 0.05 % topical foam BESSY AA PRN UTD  3    albuterol (PROVENTIL HFA, VENTOLIN HFA, PROAIR HFA) 90 mcg/actuation inhaler Take 2 Puffs by inhalation every four (4) hours as needed for Wheezing. 1 Inhaler 0    azithromycin (ZITHROMAX Z-ESE) 250 mg tablet Take 2 now, then 1 daily for 4 days 6 Tab 0    omeprazole (PRILOSEC) 20 mg capsule Take 1 Cap by mouth daily.  30 Cap 6    ALPRAZolam (XANAX) 0.25 mg tablet TAKE  ONE (1)  TABLET(S)  TWICE DAILY AS NEEDED FORANXIETY 30 Tab 3    glucose blood VI test strips (FREESTYLE TEST) strip Freestyle freedom lite test trips  -check fsbs every day  250.00 100 Strip 11    Blood-Glucose Meter monitoring kit Use as directed 1 Kit 0    albuterol (PROVENTIL HFA, VENTOLIN HFA, PROAIR HFA) 90 mcg/actuation inhaler Take 2 Puffs by inhalation every six (6) hours as needed for Wheezing. 1 Inhaler 0     Allergies   Allergen Reactions    Pcn [Penicillins] Unknown (comments)     Given as a child and can't remember reaction      Lab Results  Component Value Date/Time   Hemoglobin A1c 8.1 02/09/2017 10:33 AM   Hemoglobin A1c 8.0 01/11/2016 09:29 AM   Hemoglobin A1c 7.6 04/10/2015 09:28 AM   Glucose 146 02/09/2017 10:33 AM   Glucose (POC) 112 02/03/2013 07:15 AM   Microalb/Creat ratio (ug/mg creat.) 3.9 06/09/2017 09:30 AM   LDL, calculated 79 02/09/2017 10:33 AM   Creatinine 0.82 02/09/2017 10:33 AM      Lab Results  Component Value Date/Time   Cholesterol, total 158 02/09/2017 10:33 AM   Cholesterol (POC) 188 02/26/2013 09:38 AM   HDL Cholesterol 45 02/09/2017 10:33 AM   LDL, calculated 79 02/09/2017 10:33 AM   LDL Cholesterol (POC) 101 02/26/2013 09:38 AM   Triglyceride 170 02/09/2017 10:33 AM   Triglycerides (POC) 184 02/26/2013 09:38 AM     Lab Results  Component Value Date/Time   GFR est non-AA 80 02/09/2017 10:33 AM   GFR est AA 93 02/09/2017 10:33 AM   Creatinine 0.82 02/09/2017 10:33 AM   BUN 13 02/09/2017 10:33 AM   Sodium 140 02/09/2017 10:33 AM   Potassium 3.8 02/09/2017 10:33 AM   Chloride 93 02/09/2017 10:33 AM   CO2 30 02/09/2017 10:33 AM        ROS    Physical Exam   Constitutional: She appears well-developed and well-nourished. Appears stated age   Cardiovascular: Normal rate, regular rhythm and normal heart sounds. Exam reveals no gallop and no friction rub. No murmur heard. Pulmonary/Chest: Effort normal and breath sounds normal. No respiratory distress. She has no wheezes. Abdominal: Soft.  Bowel sounds are normal.   Musculoskeletal: She exhibits no edema. Neurological: She is alert. Psychiatric: She has a normal mood and affect. Nursing note and vitals reviewed. ASSESSMENT and PLAN  Diagnoses and all orders for this visit:    1. Type 2 diabetes mellitus without complication, without long-term current use of insulin (HCC)  -     CBC W/O DIFF  -     METABOLIC PANEL, COMPREHENSIVE  -     AMB POC HEMOGLOBIN A1C 7.5  -     CBC W/O DIFF  -     Change metformin to ER 2000mg qam    Advised fsbs checks at home   Pt will work on her diet and exercise    2. Essential hypertension, benign  -     CBC W/O DIFF  -     METABOLIC PANEL, COMPREHENSIVE  -     controlled    3. Pure hypercholesterolemia  -     METABOLIC PANEL, COMPREHENSIVE  -     LIPID PANEL  -     CBC W/O DIFF  -     Continue statin    4. Hepatic steatosis   Weight reduction needed-discussed  5. Encounter for immunization  -     [place] IMMUNIZ ADMIN,1 SINGLE/COMB VAC/TOXOID  -     Influenza virus vaccine (QUADRIVALENT PRES FREE SYRINGE) IM (17965)    6. Primary insomnia  -     ALPRAZolam (XANAX) 0.25 mg tablet; TAKE  ONE (1)  TABLET(S)  TWICE DAILY AS   NEEDED FORANXIETY-refilled    7. Colon cancer screening  -     Stanislaw Abad [facility]    Other orders  -     metFORMIN ER (GLUCOPHAGE XR) 500 mg tablet; Take 4 Tabs by mouth daily (with dinner). Follow-up Disposition:  Return in about 4 months (around 5/9/2018) for dm-2 htn hld.

## 2018-01-10 ENCOUNTER — TELEPHONE (OUTPATIENT)
Dept: INTERNAL MEDICINE CLINIC | Age: 58
End: 2018-01-10

## 2018-01-10 NOTE — TELEPHONE ENCOUNTER
Spoke with patient after 2 patient identifiers being note and advised per Dr. Opal Nails that metformin and xanax had been sent into pharmacy and she could pick them up at her discretion. Patient expressed understanding and has no further questions at this time.

## 2018-01-10 NOTE — TELEPHONE ENCOUNTER
----- Message from [employer] Ihsan sent at 1/9/2018  4:32 PM EST -----  Regarding: /Telephone  Pt would like a return call.      Best contact for the pt is [phone]        Message copied/pasted from [facility]

## 2018-01-24 LAB
ALBUMIN SERPL-MCNC: 4.2 G/DL (ref 3.5–5.5)
ALBUMIN/GLOB SERPL: 1.5 {RATIO} (ref 1.2–2.2)
ALP SERPL-CCNC: 70 IU/L (ref 39–117)
ALT SERPL-CCNC: 23 IU/L (ref 0–32)
AST SERPL-CCNC: 17 IU/L (ref 0–40)
BILIRUB SERPL-MCNC: 0.3 MG/DL (ref 0–1.2)
BUN SERPL-MCNC: 15 MG/DL (ref 6–24)
BUN/CREAT SERPL: 19 (ref 9–23)
CALCIUM SERPL-MCNC: 9.5 MG/DL (ref 8.7–10.2)
CHLORIDE SERPL-SCNC: 94 MMOL/L (ref 96–106)
CHOLEST SERPL-MCNC: 165 MG/DL (ref 100–199)
CO2 SERPL-SCNC: 29 MMOL/L (ref 18–29)
CREAT SERPL-MCNC: 0.81 MG/DL (ref 0.57–1)
ERYTHROCYTE [DISTWIDTH] IN BLOOD BY AUTOMATED COUNT: 13.8 % (ref 12.3–15.4)
GLOBULIN SER CALC-MCNC: 2.8 G/DL (ref 1.5–4.5)
GLUCOSE SERPL-MCNC: 131 MG/DL (ref 65–99)
HCT VFR BLD AUTO: 44.8 % (ref 34–46.6)
HDLC SERPL-MCNC: 43 MG/DL
HGB BLD-MCNC: 14.4 G/DL (ref 11.1–15.9)
LDLC SERPL CALC-MCNC: 72 MG/DL (ref 0–99)
MCH RBC QN AUTO: 25.7 PG (ref 26.6–33)
MCHC RBC AUTO-ENTMCNC: 32.1 G/DL (ref 31.5–35.7)
MCV RBC AUTO: 80 FL (ref 79–97)
PLATELET # BLD AUTO: 297 X10E3/UL (ref 150–379)
POTASSIUM SERPL-SCNC: 3.9 MMOL/L (ref 3.5–5.2)
PROT SERPL-MCNC: 7 G/DL (ref 6–8.5)
RBC # BLD AUTO: 5.61 X10E6/UL (ref 3.77–5.28)
SODIUM SERPL-SCNC: 141 MMOL/L (ref 134–144)
TRIGL SERPL-MCNC: 251 MG/DL (ref 0–149)
VLDLC SERPL CALC-MCNC: 50 MG/DL (ref 5–40)
WBC # BLD AUTO: 8.1 X10E3/UL (ref 3.4–10.8)

## 2018-09-24 ENCOUNTER — APPOINTMENT (OUTPATIENT)
Dept: GENERAL RADIOLOGY | Age: 58
End: 2018-09-24
Attending: EMERGENCY MEDICINE
Payer: COMMERCIAL

## 2018-09-24 ENCOUNTER — HOSPITAL ENCOUNTER (EMERGENCY)
Age: 58
Discharge: HOME | End: 2018-09-24
Attending: EMERGENCY MEDICINE | Admitting: EMERGENCY MEDICINE
Payer: COMMERCIAL

## 2018-09-24 VITALS
RESPIRATION RATE: 22 BRPM | SYSTOLIC BLOOD PRESSURE: 153 MMHG | DIASTOLIC BLOOD PRESSURE: 90 MMHG | HEART RATE: 85 BPM | OXYGEN SATURATION: 96 % | TEMPERATURE: 99.8 F

## 2018-09-24 DIAGNOSIS — S16.1XXA STRAIN OF NECK MUSCLE, INITIAL ENCOUNTER: Primary | ICD-10-CM

## 2018-09-24 DIAGNOSIS — M50.30 DDD (DEGENERATIVE DISC DISEASE), CERVICAL: ICD-10-CM

## 2018-09-24 PROCEDURE — 96372 THER/PROPH/DIAG INJ SC/IM: CPT

## 2018-09-24 PROCEDURE — 72050 X-RAY EXAM NECK SPINE 4/5VWS: CPT

## 2018-09-24 PROCEDURE — 74011250636 HC RX REV CODE- 250/636: Performed by: EMERGENCY MEDICINE

## 2018-09-24 PROCEDURE — 72052 X-RAY EXAM NECK SPINE 6/>VWS: CPT

## 2018-09-24 PROCEDURE — 99283 EMERGENCY DEPT VISIT LOW MDM: CPT

## 2018-09-24 PROCEDURE — 74011250637 HC RX REV CODE- 250/637: Performed by: EMERGENCY MEDICINE

## 2018-09-24 RX ORDER — HYDROCODONE BITARTRATE AND ACETAMINOPHEN 5; 325 MG/1; MG/1
1-2 TABLET ORAL
Qty: 20 TAB | Refills: 0 | Status: SHIPPED | OUTPATIENT
Start: 2018-09-24 | End: 2019-12-06

## 2018-09-24 RX ORDER — NAPROXEN 500 MG/1
500 TABLET, DELAYED RELEASE ORAL 2 TIMES DAILY WITH MEALS
Qty: 20 TAB | Refills: 0 | Status: SHIPPED | OUTPATIENT
Start: 2018-09-24 | End: 2019-12-06

## 2018-09-24 RX ORDER — KETOROLAC TROMETHAMINE 30 MG/ML
60 INJECTION, SOLUTION INTRAMUSCULAR; INTRAVENOUS
Status: COMPLETED | OUTPATIENT
Start: 2018-09-24 | End: 2018-09-24

## 2018-09-24 RX ORDER — METHOCARBAMOL 500 MG/1
500 TABLET, FILM COATED ORAL 3 TIMES DAILY
Qty: 20 TAB | Refills: 0 | Status: SHIPPED | OUTPATIENT
Start: 2018-09-24 | End: 2019-12-06

## 2018-09-24 RX ORDER — METHOCARBAMOL 500 MG/1
500 TABLET, FILM COATED ORAL
Status: COMPLETED | OUTPATIENT
Start: 2018-09-24 | End: 2018-09-24

## 2018-09-24 RX ADMIN — METHOCARBAMOL 500 MG: 500 TABLET ORAL at 20:44

## 2018-09-24 RX ADMIN — KETOROLAC TROMETHAMINE 60 MG: 30 INJECTION, SOLUTION INTRAMUSCULAR; INTRAVENOUS at 20:43

## 2018-09-25 NOTE — ED TRIAGE NOTES
Patient arriving with c/o neck pain after lifting heavy boxes on Saturday; reports Motrin and Tylenol have not helped her pain. Reports that she felt better when in the pool today at the gym. 8/10, aching in nature, worse with specific movements.

## 2018-09-25 NOTE — ED PROVIDER NOTES
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 She describes a dull, and throbbing discomfort, secondary to 8/10, worse with movement and mild relief by resting still. She has attempted to take Tylenol and ibuprofen without any relief of discomfort. The patient denies any fever, chills, cough, runny nose, congestion,headache or blurred vision, chest pain, shortness of breath, nausea, vomiting, abdominal pain, diarrhea, constipation, dizziness, extremity weakness or numbness, skin no rash, sick contacts, prior history of same Patient is a 62 y.o. female presenting with neck pain. Neck Pain Past Medical History:  
Diagnosis Date  Arrhythmia   
 heart murmur  Chronic pain  Contact dermatitis and other eczema, due to unspecified cause  Diabetes ([facility] [address]  GERD (gastroesophageal reflux disease)  Hypertension  Joint pain   
 & muscle aches  Sleep apnea   
 zapata not use c-pap Past Surgical History:  
Procedure Laterality Date [address]  
 breast reduction  CHEST SURGERY PROCEDURE UNLISTED  1/29/13 THYROIDECTOMY Retrosternal and Retromandibular!  HX CARPAL TUNNEL RELEASE  HX HYSTERECTOMY  2002?  HX ORTHOPAEDIC  2008, 2009  
 carpal tunnel  HX ORTHOPAEDIC  2010  
 torn meniscus  HX THYROIDECTOMY  01/2013  
 right side  GA MOHS SURG, ADDL BLOCK    
 basal cell on nose Family History:  
Problem Relation Age of Onset  Hypertension Father  Stroke Father Social History Social History  Marital status:  Spouse name: N/A  
 Number of children: N/A  
 Years of education: N/A Occupational History  Not on file. Social History Main Topics  Smoking status: Never Smoker  Smokeless tobacco: Never Used  Alcohol use 1.0 oz/week 1 Glasses of wine, 1 Cans of beer per week Comment: rarely, 3x a month  Drug use: Yes Special: Prescription  Sexual activity: Yes  
  Partners: Female Other Topics Concern  Not on file Social History Narrative ALLERGIES: Pcn [penicillins] Review of Systems Musculoskeletal: Positive for neck pain. All other systems reviewed and are negative. Vitals:  
 09/24/18 2012 BP: 153/90 Pulse: 85 Resp: 22 Temp: 99.8 °F (37.7 °C) SpO2: 96% Physical Exam  
Nursing note and vitals reviewed. CONSTITUTIONAL: Well-appearing; well-nourished; in mild distress HEAD: Normocephalic; atraumatic EYES: PERRL; EOM intact; conjunctiva and sclera are clear bilaterally. ENT: No rhinorrhea; normal pharynx with no tonsillar hypertrophy; mucous membranes pink/moist, no erythema, no exudate. NECK: Supple; bilateral paraspinal muscle tenderness; decreased range of motion secondary to pain; neurovascular intact; no cervical lymphadenopathy CARD: Normal S1, S2; no murmurs, rubs, or gallops. Regular rate and rhythm. RESP: Normal respiratory effort; breath sounds clear and equal bilaterally; no wheezes, rhonchi, or rales. ABD: Normal bowel sounds; non-distended; non-tender; no palpable organomegaly, no masses, no bruits. Back Exam: Normal inspection; no vertebral point tenderness, no CVA tenderness. Normal range of motion. EXT: Normal ROM in all four extremities; non-tender to palpation; no swelling or deformity; distal pulses are normal, no edema. SKIN: Warm; dry; no rash. NEURO:Alert and oriented x 3, coherent, JOSEFINA-XII grossly intact, sensory and motor are non-focal. 
 
 
 
MDM Number of Diagnoses or Management Options Diagnosis management comments: Assessment: cervical strain/spasm-, rule out cervical spine disease Plan: x-ray of the cervical spine/   analgesia and muscle relaxant/ serial exam/ Monitor and Reevaluate. Amount and/or Complexity of Data Reviewed Clinical lab tests: ordered and reviewed Tests in the radiology section of CPT®: ordered and reviewed Tests in the medicine section of CPT®: reviewed and ordered Discussion of test results with the performing providers: yes Decide to obtain previous medical records or to obtain history from someone other than the patient: yes Obtain history from someone other than the patient: yes Review and summarize past medical records: yes Discuss the patient with other providers: yes Independent visualization of images, tracings, or specimens: yes Risk of Complications, Morbidity, and/or Mortality Presenting problems: moderate Diagnostic procedures: moderate Management options: moderate ED Course Procedures XRAY INTERPRETATION (ED MD) Xray of cervical spine shows DDD; no fracture. No subluxation/dislocation. No bony abnormality. Danielle Underwood MD 8:33 PM 
 
Progress Note:  
Pt has been reexamined by Danielle Underwood MD. Pt is feeling much better. Symptoms have improved. All available results have been reviewed with pt and any available family. Pt understands sx, dx, and tx in ED. Care plan has been outlined and questions have been answered. Pt is ready to go home. Will send home on cervical strain instructions. Prescription of naproxen, Robaxin, and Norco. Outpatient referral with PCP as needed. Written by Danielle Underwood MD,8:33 PM 
 
. Rell Strange

## 2018-09-25 NOTE — DISCHARGE INSTRUCTIONS
We hope that we have addressed all of your medical concerns. The examination and treatment you received in the Emergency Department were for an emergent problem and were not intended as complete care. It is important that you follow up with your healthcare provider(s) for ongoing care. If your symptoms worsen or do not improve as expected, and you are unable to reach your usual health care provider(s), you should return to the Emergency Department. Today's healthcare is undergoing tremendous change, and patient satisfaction surveys are one of the many tools to assess the quality of medical care. You may receive a survey from the [employer] organization regarding your experience in the Emergency Department. I hope that your experience has been completely positive, particularly the medical care that I provided. As such, please participate in the survey; anything less than excellent does not meet my expectations or intentions. [facility] and [address] [place] [address] participate in nationally recognized quality of care measures. If your blood pressure is greater than 120/80, as reported below, we urge that you seek medical care to address the potential of high blood pressure, commonly known as hypertension. Hypertension can be hereditary or can be caused by certain medical conditions, pain, stress, or \"white coat syndrome. \"       Please make an appointment with your health care provider(s) for follow up of your Emergency Department visit. VITALS:   Patient Vitals for the past 8 hrs:   Temp Pulse Resp BP SpO2   09/24/18 2012 99.8 °F (37.7 °C) 85 22 153/90 96 %          Thank you for allowing us to provide you with medical care today. We realize that you have many choices for your emergency care needs. Please choose us in the future for any continued health care needs. Jovon Guzmán MD    [facility].   Office: [phone]            No results found for this or any previous visit (from the past 24 hour(s)). Xr Spine Fredick Punches W Obl/flex/ext Min 6 V Comp    Result Date: 9/24/2018  EXAM:  XR SPINE CERV W OBL/FLEX/EXT MIN 6 V COMP INDICATION:  Neck Pain COMPARISON: None. FINDINGS: AP, lateral, bilateral oblique and open mouth odontoid views  of the cervical spine were obtained. The alignment is normal.  The vertebral body heights are well-preserved. There is C5-6 and C6-7 disc space narrowing with endplate hypertrophy. There is no fracture or subluxation. The prevertebral soft tissues are normal.  The odontoid process is intact and the C1-C2 relationship is normal. The neural foramina are symmetrical. There is calcification of ligamentum nuchae. IMPRESSION: Multilevel degenerative disc disease          Neck Strain: Care Instructions  Your Care Instructions    You have strained the muscles and ligaments in your neck. A sudden, awkward movement can strain the neck. This often occurs with falls or car accidents or during certain sports. Everyday activities like working on a computer or sleeping can also cause neck strain if they force you to hold your neck in an awkward position for a long time. It is common for neck pain to get worse for a day or two after an injury, but it should start to feel better after that. You may have more pain and stiffness for several days before it gets better. This is expected. It may take a few weeks or longer for it to heal completely. Good home treatment can help you get better faster and avoid future neck problems. Follow-up care is a key part of your treatment and safety. Be sure to make and go to all appointments, and call your doctor if you are having problems. It's also a good idea to know your test results and keep a list of the medicines you take. How can you care for yourself at home?   · If you were given a neck brace (cervical collar) to limit neck motion, wear it as instructed for as many days as your doctor tells you to. Do not wear it longer than you were told to. Wearing a brace for too long can make neck stiffness worse and weaken the neck muscles. · You can try using heat or ice to see if it helps. ¨ Try using a heating pad on a low or medium setting for 15 to 20 minutes every 2 to 3 hours. Try a warm shower in place of one session with the heating pad. You can also buy single-use heat wraps that last up to 8 hours. ¨ You can also try an ice pack for 10 to 15 minutes every 2 to 3 hours. · Take pain medicines exactly as directed. ¨ If the doctor gave you a prescription medicine for pain, take it as prescribed. ¨ If you are not taking a prescription pain medicine, ask your doctor if you can take an over-the-counter medicine. · Gently rub the area to relieve pain and help with blood flow. Do not massage the area if it hurts to do so. · Do not do anything that makes the pain worse. Take it easy for a couple of days. You can do your usual activities if they do not hurt your neck or put it at risk for more stress or injury. · Try sleeping on a special neck pillow. Place it under your neck, not under your head. Placing a tightly rolled-up towel under your neck while you sleep will also work. If you use a neck pillow or rolled towel, do not use your regular pillow at the same time. · To prevent future neck pain, do exercises to stretch and strengthen your neck and back. Learn how to use good posture, safe lifting techniques, and proper body mechanics. When should you call for help? Call 911 anytime you think you may need emergency care. For example, call if:    · You are unable to move an arm or a leg at all.   [facility] your doctor now or seek immediate medical care if:    · You have new or worse symptoms in your arms, legs, chest, belly, or buttocks. Symptoms may include:  ¨ Numbness or tingling. ¨ Weakness.   ¨ Pain.     · You lose bladder or bowel control.    Watch closely for changes in your health, and be sure to contact your doctor if:    · You are not getting better as expected. Where can you learn more? Go to http://dillon-champ.info/. Enter M253 in the search box to learn more about \"Neck Strain: Care Instructions. \"  Current as of: November 29, 2017  Content Version: 11.7  © 2586-5775 [employer]. Care instructions adapted under license by [employer] (which disclaims liability or warranty for this information). If you have questions about a medical condition or this instruction, always ask your healthcare professional. [place] [address] any warranty or liability for your use of this information.

## 2018-11-07 ENCOUNTER — OFFICE VISIT (OUTPATIENT)
Dept: URGENT CARE | Age: 58
End: 2018-11-07

## 2018-11-07 VITALS
BODY MASS INDEX: 47.95 KG/M2 | RESPIRATION RATE: 16 BRPM | SYSTOLIC BLOOD PRESSURE: 144 MMHG | HEIGHT: 61 IN | TEMPERATURE: 99.2 F | WEIGHT: 254 LBS | DIASTOLIC BLOOD PRESSURE: 67 MMHG | OXYGEN SATURATION: 95 % | HEART RATE: 94 BPM

## 2018-11-07 DIAGNOSIS — J04.0 LARYNGITIS: ICD-10-CM

## 2018-11-07 DIAGNOSIS — J18.9 PNEUMONIA OF RIGHT LOWER LOBE DUE TO INFECTIOUS ORGANISM: Primary | ICD-10-CM

## 2018-11-07 DIAGNOSIS — H10.9 CONJUNCTIVITIS, UNSPECIFIED CONJUNCTIVITIS TYPE, UNSPECIFIED LATERALITY: ICD-10-CM

## 2018-11-07 DIAGNOSIS — R05.9 COUGH: ICD-10-CM

## 2018-11-07 RX ORDER — POLYMYXIN B SULFATE AND TRIMETHOPRIM 1; 10000 MG/ML; [USP'U]/ML
1 SOLUTION OPHTHALMIC EVERY 6 HOURS
Qty: 1 BOTTLE | Refills: 0 | Status: SHIPPED | OUTPATIENT
Start: 2018-11-07 | End: 2018-11-14

## 2018-11-07 RX ORDER — LEVOFLOXACIN 750 MG/1
750 TABLET ORAL DAILY
Qty: 7 TAB | Refills: 0 | Status: SHIPPED | OUTPATIENT
Start: 2018-11-07 | End: 2018-11-14

## 2018-11-07 RX ORDER — IPRATROPIUM BROMIDE AND ALBUTEROL SULFATE 2.5; .5 MG/3ML; MG/3ML
3 SOLUTION RESPIRATORY (INHALATION)
Status: COMPLETED | OUTPATIENT
Start: 2018-11-07 | End: 2018-11-07

## 2018-11-07 RX ORDER — BENZONATATE 200 MG/1
200 CAPSULE ORAL
Qty: 30 CAP | Refills: 0 | Status: SHIPPED | OUTPATIENT
Start: 2018-11-07 | End: 2019-02-22

## 2018-11-07 RX ADMIN — IPRATROPIUM BROMIDE AND ALBUTEROL SULFATE 3 ML: 2.5; .5 SOLUTION RESPIRATORY (INHALATION) at 09:40

## 2018-11-07 NOTE — PROGRESS NOTES
Cold Symptoms   The history is provided by the patient. This is a new problem. Episode onset: 6 days ago. The problem occurs constantly. The problem has been gradually worsening. The cough is productive of sputum. Patient reports a subjective fever - was not measured. Associated symptoms include chills, sweats, eye redness (left), ear congestion, rhinorrhea, sore throat and wheezing. Pertinent negatives include no chest pain, no ear pain, no headaches, no myalgias, no shortness of breath, no nausea and no vomiting. She has tried cough syrup and decongestants for the symptoms. The treatment provided no relief. She is not a smoker. Her past medical history is significant for bronchitis and asthma. Past Medical History:   Diagnosis Date    Arrhythmia     heart murmur    Chronic pain     Contact dermatitis and other eczema, due to unspecified cause     Diabetes (HCC)     GERD (gastroesophageal reflux disease)     Hypertension     Joint pain     & muscle aches    Sleep apnea     zapata not use c-pap        Past Surgical History:   Procedure Laterality Date    BREAST SURGERY PROCEDURE UNLISTED  1984    breast reduction    CHEST SURGERY PROCEDURE UNLISTED  1/29/13     THYROIDECTOMY Retrosternal and Retromandibular!  HX CARPAL TUNNEL RELEASE      HX HYSTERECTOMY  2002?     HX ORTHOPAEDIC  2008, 2009    carpal tunnel    HX ORTHOPAEDIC  2010    torn meniscus    HX THYROIDECTOMY  01/2013    right side    OH MOHS SURG, ADDL BLOCK      basal cell on nose         Family History   Problem Relation Age of Onset    Hypertension Father     Stroke Father         Social History     Socioeconomic History    Marital status:      Spouse name: Not on file    Number of children: Not on file    Years of education: Not on file    Highest education level: Not on file   Social Needs    Financial resource strain: Not on file    Food insecurity - worry: Not on file    Food insecurity - inability: Not on file  Transportation needs - medical: Not on file   Wander (f. [employer]) needs - non-medical: Not on file   Occupational History    Not on file   Tobacco Use    Smoking status: Never Smoker    Smokeless tobacco: Never Used   Substance and Sexual Activity    Alcohol use: Yes     Alcohol/week: 1.0 oz     Types: 1 Glasses of wine, 1 Cans of beer per week     Comment: rarely, 3x a month    Drug use: Yes     Types: Prescription    Sexual activity: Yes     Partners: Female   Other Topics Concern    Not on file   Social History Narrative    Not on file                ALLERGIES: Pcn [penicillins]    Review of Systems   Constitutional: Positive for chills and fever. Negative for activity change and appetite change. HENT: Positive for congestion, rhinorrhea, sinus pressure, sinus pain, sore throat and voice change. Negative for ear pain and trouble swallowing. Eyes: Positive for pain, discharge and redness (left). Negative for photophobia, itching and visual disturbance. Respiratory: Positive for cough and wheezing. Negative for shortness of breath. Cardiovascular: Negative for chest pain and palpitations. Gastrointestinal: Negative for nausea and vomiting. Musculoskeletal: Negative for myalgias. Neurological: Negative for dizziness and headaches. Hematological: Negative for adenopathy. Vitals:    11/07/18 0914   BP: 144/67   Pulse: 94   Resp: 16   Temp: 99.2 °F (37.3 °C)   SpO2: 95%   Weight: 254 lb (115.2 kg)   Height: 5' 1\" (1.549 m)       Physical Exam   Constitutional: She appears well-developed and well-nourished. No distress. HENT:   Right Ear: External ear and ear canal normal. Tympanic membrane is erythematous. Left Ear: External ear and ear canal normal. Tympanic membrane is erythematous. Nose: Rhinorrhea present. Right sinus exhibits maxillary sinus tenderness and frontal sinus tenderness. Left sinus exhibits maxillary sinus tenderness and frontal sinus tenderness.    Mouth/Throat: Mucous membranes are normal. Posterior oropharyngeal edema and posterior oropharyngeal erythema present. No oropharyngeal exudate or tonsillar abscesses. Eyes: EOM are normal. Pupils are equal, round, and reactive to light. Right eye exhibits no discharge. Left eye exhibits no discharge. Left conjunctiva is injected. Cardiovascular: Normal rate, regular rhythm and normal heart sounds. Pulmonary/Chest: She is in respiratory distress. She has decreased breath sounds in the left lower field. She has no wheezes. She has no rhonchi. She has no rales. Lymphadenopathy:     She has cervical adenopathy. Neurological: She is alert. Skin: She is not diaphoretic. Psychiatric: She has a normal mood and affect. Her behavior is normal. Judgment and thought content normal.   Nursing note and vitals reviewed. MDM    ICD-10-CM ICD-9-CM    1. Pneumonia of right lower lobe due to infectious organism ([facility] [address] J18.1 486    2. Laryngitis J04.0 464.00    3. Cough R05 786.2 XR CHEST PA LAT   4. Conjunctivitis, unspecified conjunctivitis type, unspecified laterality H10.9 372.30      Medications Ordered Today   Medications    albuterol-ipratropium (DUO-NEB) 2.5 MG-0.5 MG/3 ML     Order Specific Question:   MODE OF DELIVERY     Answer:   Nebulizer    levoFLOXacin (LEVAQUIN) 750 mg tablet     Sig: Take 1 Tab by mouth daily for 7 days. Dispense:  7 Tab     Refill:  0    benzonatate (TESSALON) 200 mg capsule     Sig: Take 1 Cap by mouth three (3) times daily as needed for Cough. Dispense:  30 Cap     Refill:  0    trimethoprim-polymyxin b (POLYTRIM) ophthalmic solution     Sig: Administer 1 Drop to left eye every six (6) hours for 7 days. Dispense:  1 Bottle     Refill:  0     The patients condition was discussed with the patient and they understand. The patient is to follow up with PCP in 2 days. If signs and symptoms become worse the pt is to go to the ER. The patient is to take medications as prescribed. XR Results (most recent):  Results from Appointment encounter on 11/07/18   XR CHEST PA LAT    Narrative EXAM:  XR CHEST PA LAT    INDICATION:  Cough and fever. COMPARISON: None    TECHNIQUE: PA and lateral chest views    FINDINGS: The cardiomediastinal and hilar contours are within normal limits. The  pulmonary vasculature is within normal limits. Subtle right lower lobe airspace opacity. Left lung is clear. Pleural spaces are  clear. The visualized bones and upper abdomen are age-appropriate. Impression IMPRESSION:    Right lower lobe pneumonia given the clinical history. Recommend followup PA and  lateral chest views in 8-10 weeks to ensure resolution.              Procedures

## 2018-11-07 NOTE — PATIENT INSTRUCTIONS
Follow up with PCP in 2 days  ER if worse     Pneumonia: Care Instructions  Your Care Instructions    Pneumonia is an infection of the lungs. Most cases are caused by infections from bacteria or viruses. Pneumonia may be mild or very severe. If it is caused by bacteria, you will be treated with antibiotics. It may take a few weeks to a few months to recover fully from pneumonia, depending on how sick you were and whether your overall health is good. Follow-up care is a key part of your treatment and safety. Be sure to make and go to all appointments, and call your doctor if you are having problems. It's also a good idea to know your test results and keep a list of the medicines you take. How can you care for yourself at home? · Take your antibiotics exactly as directed. Do not stop taking the medicine just because you are feeling better. You need to take the full course of antibiotics. · Take your medicines exactly as prescribed. Call your doctor if you think you are having a problem with your medicine. · Get plenty of rest and sleep. You may feel weak and tired for a while, but your energy level will improve with time. · To prevent dehydration, drink plenty of fluids, enough so that your urine is light yellow or clear like water. Choose water and other caffeine-free clear liquids until you feel better. If you have kidney, heart, or liver disease and have to limit fluids, talk with your doctor before you increase the amount of fluids you drink. · Take care of your cough so you can rest. A cough that brings up mucus from your lungs is common with pneumonia. It is one way your body gets rid of the infection. But if coughing keeps you from resting or causes severe fatigue and chest-wall pain, talk to your doctor. He or she may suggest that you take a medicine to reduce the cough. · Use a vaporizer or humidifier to add moisture to your bedroom. Follow the directions for cleaning the machine.   · Do not smoke or allow others to smoke around you. Smoke will make your cough last longer. If you need help quitting, talk to your doctor about stop-smoking programs and medicines. These can increase your chances of quitting for good. · Take an over-the-counter pain medicine, such as acetaminophen (Tylenol), ibuprofen (Advil, Motrin), or naproxen (Aleve). Read and follow all instructions on the label. · Do not take two or more pain medicines at the same time unless the doctor told you to. Many pain medicines have acetaminophen, which is Tylenol. Too much acetaminophen (Tylenol) can be harmful. · If you were given a spirometer to measure how well your lungs are working, use it as instructed. This can help your doctor tell how your recovery is going. · To prevent pneumonia in the future, talk to your doctor about getting a flu vaccine (once a year) and a pneumococcal vaccine (one time only for most people). When should you call for help? Call 911 anytime you think you may need emergency care. For example, call if:    · You have severe trouble breathing.    Call your doctor now or seek immediate medical care if:    · You cough up dark brown or bloody mucus (sputum).     · You have new or worse trouble breathing.     · You are dizzy or lightheaded, or you feel like you may faint.    Watch closely for changes in your health, and be sure to contact your doctor if:    · You have a new or higher fever.     · You are coughing more deeply or more often.     · You are not getting better after 2 days (48 hours).     · You do not get better as expected. Where can you learn more? Go to http://dillon-champ.info/. Enter [id number] in the search box to learn more about \"Pneumonia: Care Instructions. \"  Current as of: December 6, 2017  Content Version: 11.8  © 7631-3951 [employer]. Care instructions adapted under license by [employer] (which disclaims liability or warranty for this information).  If you have questions about a medical condition or this instruction, always ask your healthcare professional. [address] any warranty or liability for your use of this information. Laryngitis: Care Instructions  Your Care Instructions    Laryngitis is an inflammation of the voice box (larynx) that causes your voice to become raspy or hoarse. It can be short-lived or long-lasting. Most of the time, laryngitis comes on quickly and lasts as long as 2 weeks. It is caused by overuse, irritation, or infection of the vocal cords inside the larynx. Some of the most common causes are a cold, the flu, or allergies. Loud talking, shouting, cheering, or singing also can cause laryngitis. Stomach acid that backs up into the throat also can make you lose your voice. Resting your voice and taking other steps at home can help you get your voice back. Follow-up care is a key part of your treatment and safety. Be sure to make and go to all appointments, and call your doctor if you are having problems. It's also a good idea to know your test results and keep a list of the medicines you take. How can you care for yourself at home? · Follow your doctor's directions for treating the condition that caused you to lose your voice. If your doctor prescribed antibiotics, take them as directed. Do not stop taking them just because you feel better. You need to take the full course of antibiotics. · Before you use cough and cold medicines, check the label. They may not be safe for young children or for people with certain health problems. · Try to keep stomach acid from backing up into your throat. Do not eat just before bedtime. Reduce the amount of coffee and alcohol you drink, and eat healthy foods. Taking over-the-counter acid reducers can help when these steps are not enough. In some cases, you may need prescription medicine. · Rest your voice.  You do not have to stop speaking, but use your voice as little as possible. Speak softly but do not whisper; whispering can bother your larynx more than speaking softly. Avoid talking on the telephone or trying to speak loudly. · Try not to clear your throat. This can cause more irritation of your larynx. Take an over-the-counter cough suppressant (if your doctor recommends it) if you have a dry cough that does not produce mucus. · Do not smoke or allow others to smoke around you. If you need help quitting, talk to your doctor about stop-smoking programs and medicines. These can increase your chances of quitting for good. · Use a humidifier or vaporizer to add moisture to your bedroom. Humidity helps to thin the mucus in the nasal membranes that causes stuffiness or postnasal drip. Follow the directions for cleaning the machine. · Drink plenty of fluids, enough so that your urine is light yellow or clear like water. If you have kidney, heart, or liver disease and have to limit fluids, talk with your doctor before you increase the amount of fluids you drink. · Use saline (saltwater) nasal washes to help keep your nasal passages open and wash out mucus and bacteria. You can buy saline nose drops at a grocery store or drugstore. Or, you can make your own at home by mixing ½ teaspoon salt, 1 cup water (at room temperature), and ½ teaspoon baking soda. If you make your own, fill a bulb syringe with the solution, insert the tip into your nostril, and squeeze gently. Francesca Mon your nose. When should you call for help? Call 911 anytime you think you may need emergency care. For example, call if:    · You have trouble breathing.    Call your doctor now or seek immediate medical care if:    · You have new or worse pain.     · You have trouble swallowing.    Watch closely for changes in your health, and be sure to contact your doctor if:    · You do not get better as expected. Where can you learn more? Go to http://dillon-champ.info/.   Enter N114 in the search box to learn more about \"Laryngitis: Care Instructions. \"  Current as of: March 28, 2018  Content Version: 11.8  © 7821-1190 [employer]. Care instructions adapted under license by [employer] (which disclaims liability or warranty for this information). If you have questions about a medical condition or this instruction, always ask your healthcare professional. [address] any warranty or liability for your use of this information.

## 2018-11-09 ENCOUNTER — OFFICE VISIT (OUTPATIENT)
Dept: INTERNAL MEDICINE CLINIC | Age: 58
End: 2018-11-09

## 2018-11-09 VITALS
HEIGHT: 61 IN | DIASTOLIC BLOOD PRESSURE: 82 MMHG | BODY MASS INDEX: 47.77 KG/M2 | HEART RATE: 86 BPM | TEMPERATURE: 98.5 F | SYSTOLIC BLOOD PRESSURE: 137 MMHG | OXYGEN SATURATION: 94 % | WEIGHT: 253 LBS

## 2018-11-09 DIAGNOSIS — J18.9 COMMUNITY ACQUIRED PNEUMONIA OF RIGHT LOWER LOBE OF LUNG: Primary | ICD-10-CM

## 2018-11-09 PROBLEM — E66.01 OBESITY, MORBID (HCC): Status: ACTIVE | Noted: 2018-11-09

## 2018-11-09 RX ORDER — CODEINE PHOSPHATE AND GUAIFENESIN 10; 100 MG/5ML; MG/5ML
5 SOLUTION ORAL
Qty: 120 ML | Refills: 0 | Status: SHIPPED | OUTPATIENT
Start: 2018-11-09 | End: 2019-02-22

## 2018-11-09 NOTE — PROGRESS NOTES
Chief Complaint Patient presents with  Follow-up  
  pneumonia  Yeast Infection  
  mouth ([place])

## 2018-11-09 NOTE — PROGRESS NOTES
HISTORY OF PRESENT ILLNESS Ana Alvares is a 62 y.o. female. HPI  
 
F/u RLL pneumonia Was seen at [facility] clinic with subjective fever and productive cough x 6days 
cxr-RLL ASDZ 
rx levaquin 750 mg every day x 7d, duoneb and tessalon Hx asthma Never a smoker Patient Active Problem List  
 Diagnosis Date Noted  Type 2 diabetes mellitus without complication ([facility] [address] 00/99/1906  Hepatic steatosis 04/22/2015  HLD (hyperlipidemia) 06/28/2014  Goiter 06/27/2013  Diabetes mellitus ([facility] [address] 11/03/2011  Type 1 von Willebrand disease ([facility] [address] 02/12/2010  Essential hypertension, benign 02/12/2010  Obesity 02/12/2010  PEGGY (obstructive sleep apnea) 02/12/2010  GERD (gastroesophageal reflux disease) 02/12/2010  Venous insufficiency 02/12/2010  Nephrolithiasis 02/12/2010  Allergic rhinitis 02/12/2010  Asthma 02/12/2010  Carpal tunnel syndrome 02/12/2010  Basal cell carcinoma of nose 02/12/2010  Migraine 02/12/2010 Current Outpatient Medications Medication Sig Dispense Refill  levoFLOXacin (LEVAQUIN) 750 mg tablet Take 1 Tab by mouth daily for 7 days. 7 Tab 0  
 benzonatate (TESSALON) 200 mg capsule Take 1 Cap by mouth three (3) times daily as needed for Cough. 30 Cap 0  
 trimethoprim-polymyxin b (POLYTRIM) ophthalmic solution Administer 1 Drop to left eye every six (6) hours for 7 days. 1 Bottle 0  
 metFORMIN ER (GLUCOPHAGE XR) 500 mg tablet TAKE 4 TABLETS BY MOUTH DAILY 120 Tab 11  
 naproxen EC (NAPROSYN EC) 500 mg EC tablet Take 1 Tab by mouth two (2) times daily (with meals). 20 Tab 0  
 methocarbamol (ROBAXIN) 500 mg tablet Take 1 Tab by mouth three (3) times daily. 20 Tab 0  
 HYDROcodone-acetaminophen (NORCO) 5-325 mg per tablet Take 1-2 Tabs by mouth every six (6) hours as needed for Pain. Max Daily Amount: 8 Tabs. 20 Tab 0  pravastatin (PRAVACHOL) 10 mg tablet TAKE 1 TABLET BY MOUTH EVERY DAY 30 Tab 11  
  triamterene-hydroCHLOROthiazide (MAXZIDE) 37.5-25 mg per tablet TAKE 2 TABLETS BY MOUTH EVERY DAY 60 Tab 11  
 ALPRAZolam (XANAX) 0.25 mg tablet TAKE  ONE (1)  TABLET(S)  TWICE DAILY AS   NEEDED FORANXIETY 30 Tab 3  clobetasol (OLUX) 0.05 % topical foam BESSY AA PRN UTD  3  
 albuterol (PROVENTIL HFA, VENTOLIN HFA, PROAIR HFA) 90 mcg/actuation inhaler Take 2 Puffs by inhalation every four (4) hours as needed for Wheezing. 1 Inhaler 0  
 omeprazole (PRILOSEC) 20 mg capsule Take 1 Cap by mouth daily. 30 Cap 6  
 glucose blood VI test strips (FREESTYLE TEST) strip Freestyle freedom lite test trips  -check fsbs every day  250.00 100 Strip 11  Blood-Glucose Meter monitoring kit Use as directed 1 Kit 0  
 albuterol (PROVENTIL HFA, VENTOLIN HFA, PROAIR HFA) 90 mcg/actuation inhaler Take 2 Puffs by inhalation every six (6) hours as needed for Wheezing. 1 Inhaler 0 Allergies Allergen Reactions  Pcn [Penicillins] Unknown (comments) Given as a child and can't remember reaction Lab Results Component Value Date/Time GFR est non-AA 81 01/23/2018 09:28 AM  
 GFR est AA 93 01/23/2018 09:28 AM  
 Creatinine 0.81 01/23/2018 09:28 AM  
 BUN 15 01/23/2018 09:28 AM  
 Sodium 141 01/23/2018 09:28 AM  
 Potassium 3.9 01/23/2018 09:28 AM  
 Chloride 94 (L) 01/23/2018 09:28 AM  
 CO2 29 01/23/2018 09:28 AM  
  
ROS Physical Exam  
Constitutional: She appears well-developed and well-nourished. Appears stated age Cardiovascular: Normal rate, regular rhythm and normal heart sounds. Exam reveals no gallop and no friction rub. No murmur heard. Pulmonary/Chest: Effort normal and breath sounds normal. No respiratory distress. She has no wheezes. She has no rales. She exhibits no tenderness. Abdominal: Soft. Bowel sounds are normal.  
Musculoskeletal: She exhibits no edema. Neurological: She is alert. Psychiatric: She has a normal mood and affect. Nursing note and vitals reviewed. ASSESSMENT and PLAN Diagnoses and all orders for this visit: 1. Community acquired pneumonia of right lower lobe of lung ([facility] [address] 
-     guaiFENesin-codeine (ROBAFEN AC) 100-10 mg/5 mL solution; Take 5 mL by mouth three (3) times daily as needed for Cough. Max Daily Amount: 15 mL. -     XR CHEST PA LAT; Future in 4 weeks 
 sats ok , feels somewhat better Follow-up Disposition: 
Return in about 3 months (around 2/9/2019) for dm-2 htn hld.

## 2019-02-21 NOTE — PROGRESS NOTES
HISTORY OF PRESENT ILLNESS Felicita Cordova is a 62 y.o. female. HPI  
F/u DM-2 HTN HLD and f/u RLL pneumonia Due for repeat CXR for RLL asdz-nonsmoker Last a1c 7.5 LDL 72 
a1c today 8.2 Metformin was changed to ER 2000mg every day last OV--but lowered to 2 tabs every day since saw metfomin tabs in the stool No fsbs readings since last OV Some poor food choices Will start again with  No cp Weight up 7 lbs Requests xanax refil for insomnia-uses infrequently Last OV 
F/u RLL pneumonia Was seen at [facility] clinic with subjective fever and productive cough x 6days 
cxr-RLL ASDZ 
rx levaquin 750 mg every day x 7d, duoneb and tessalon Hx asthma Never a smoker Last a1c was 7.3 improved from 8.1 
  
See ophthalmologist Dr Lesvia Cali at [facility] for eye exams Had to get screening colonoscopy rescheduled due to flu like sxs 
noty on aspirin due to Idania Gunning No fsbs at home but has glucometer Has not been strict with diet last 3 weeks No ss of cp sob or neuropathy in feet Patient Active Problem List  
 Diagnosis Date Noted  Obesity, morbid ([address] 11/09/2018  Type 2 diabetes mellitus without complication ([facility] [address] 09/63/2141  Hepatic steatosis 04/22/2015  HLD (hyperlipidemia) 06/28/2014  Goiter 06/27/2013  Diabetes mellitus ([address] 11/03/2011  Type 1 von Willebrand disease ([facility] [address] 02/12/2010  Essential hypertension, benign 02/12/2010  Obesity 02/12/2010  PEGGY (obstructive sleep apnea) 02/12/2010  GERD (gastroesophageal reflux disease) 02/12/2010  Venous insufficiency 02/12/2010  Nephrolithiasis 02/12/2010  Allergic rhinitis 02/12/2010  Asthma 02/12/2010  Carpal tunnel syndrome 02/12/2010  Basal cell carcinoma of nose 02/12/2010  Migraine 02/12/2010 Current Outpatient Medications Medication Sig Dispense Refill  guaiFENesin-codeine (ROBAFEN AC) 100-10 mg/5 mL solution Take 5 mL by mouth three (3) times daily as needed for Cough. Max Daily Amount: 15 mL. 120 mL 0  
 benzonatate (TESSALON) 200 mg capsule Take 1 Cap by mouth three (3) times daily as needed for Cough. 30 Cap 0  
 metFORMIN ER (GLUCOPHAGE XR) 500 mg tablet TAKE 4 TABLETS BY MOUTH DAILY 120 Tab 11  
 naproxen EC (NAPROSYN EC) 500 mg EC tablet Take 1 Tab by mouth two (2) times daily (with meals). 20 Tab 0  
 methocarbamol (ROBAXIN) 500 mg tablet Take 1 Tab by mouth three (3) times daily. 20 Tab 0  
 HYDROcodone-acetaminophen (NORCO) 5-325 mg per tablet Take 1-2 Tabs by mouth every six (6) hours as needed for Pain. Max Daily Amount: 8 Tabs. 20 Tab 0  pravastatin (PRAVACHOL) 10 mg tablet TAKE 1 TABLET BY MOUTH EVERY DAY 30 Tab 11  
 triamterene-hydroCHLOROthiazide (MAXZIDE) 37.5-25 mg per tablet TAKE 2 TABLETS BY MOUTH EVERY DAY 60 Tab 11  
 ALPRAZolam (XANAX) 0.25 mg tablet TAKE  ONE (1)  TABLET(S)  TWICE DAILY AS   NEEDED FORANXIETY 30 Tab 3  clobetasol (OLUX) 0.05 % topical foam BESSY AA PRN UTD  3  
 albuterol (PROVENTIL HFA, VENTOLIN HFA, PROAIR HFA) 90 mcg/actuation inhaler Take 2 Puffs by inhalation every four (4) hours as needed for Wheezing. 1 Inhaler 0  
 omeprazole (PRILOSEC) 20 mg capsule Take 1 Cap by mouth daily. 30 Cap 6  
 glucose blood VI test strips (FREESTYLE TEST) strip Freestyle freedom lite test trips  -check fsbs every day  250.00 100 Strip 11  Blood-Glucose Meter monitoring kit Use as directed 1 Kit 0  
 albuterol (PROVENTIL HFA, VENTOLIN HFA, PROAIR HFA) 90 mcg/actuation inhaler Take 2 Puffs by inhalation every six (6) hours as needed for Wheezing. 1 Inhaler 0 Allergies Allergen Reactions  Pcn [Penicillins] Unknown (comments) Given as a child and can't remember reaction Lab Results Component Value Date/Time  WBC 8.1 01/23/2018 09:28 AM  
 HGB 14.4 01/23/2018 09:28 AM  
 HCT 44.8 01/23/2018 09:28 AM  
 PLATELET 947 12/48/8309 09:28 AM  
 MCV 80 01/23/2018 09:28 AM  
 
 Lab Results Component Value Date/Time Hemoglobin A1c 8.1 (H) 02/09/2017 10:33 AM  
 Hemoglobin A1c 8.0 (H) 01/11/2016 09:29 AM  
 Hemoglobin A1c 7.6 (H) 04/10/2015 09:28 AM  
 Glucose 131 (H) 01/23/2018 09:28 AM  
 Glucose (POC) 112 (H) 02/03/2013 07:15 AM  
 Microalb/Creat ratio (ug/mg creat.) 3.9 06/09/2017 09:30 AM  
 LDL, calculated 72 01/23/2018 09:28 AM  
 Creatinine 0.81 01/23/2018 09:28 AM  
  
Lab Results Component Value Date/Time Cholesterol, total 165 01/23/2018 09:28 AM  
 Cholesterol (POC) 188 02/26/2013 09:38 AM  
 HDL Cholesterol 43 01/23/2018 09:28 AM  
 LDL, calculated 72 01/23/2018 09:28 AM  
 LDL Cholesterol (POC) 101 02/26/2013 09:38 AM  
 Triglyceride 251 (H) 01/23/2018 09:28 AM  
 Triglycerides (POC) 184 02/26/2013 09:38 AM  
 
Lab Results Component Value Date/Time GFR est non-AA 81 01/23/2018 09:28 AM  
 GFR est AA 93 01/23/2018 09:28 AM  
 Creatinine 0.81 01/23/2018 09:28 AM  
 BUN 15 01/23/2018 09:28 AM  
 Sodium 141 01/23/2018 09:28 AM  
 Potassium 3.9 01/23/2018 09:28 AM  
 Chloride 94 (L) 01/23/2018 09:28 AM  
 CO2 29 01/23/2018 09:28 AM  
  
 
ROS Physical Exam  
Constitutional: She appears well-developed and well-nourished. Appears stated age, obese, nad Cardiovascular: Normal rate, regular rhythm and normal heart sounds. Exam reveals no gallop and no friction rub. No murmur heard. Pulmonary/Chest: Effort normal and breath sounds normal. No respiratory distress. She has no wheezes. Abdominal: Soft. Bowel sounds are normal.  
Musculoskeletal: She exhibits no edema. Neurological: She is alert. Psychiatric: She has a normal mood and affect. Nursing note and vitals reviewed. ASSESSMENT and PLAN Diagnoses and all orders for this visit: 
 
1. Uncontrolled type 2 diabetes mellitus with hyperglycemia ([address] -     AMB POC HEMOGLOBIN A1C 8.2 
-     MICROALBUMIN, UR, RAND W/ MICROALB/CREAT RATIO  Change metformin back to 500mg 2 bid-d/c XR 
 Add januvia 100mg every day Work on Isabella Oliver 2. Essential hypertension 
 controlled 3. Pure hypercholesterolemia Continue statin 4. Community acquired pneumonia of right lower lobe of lung ([facility] [address] -     XR CHEST PA LAT; Future 5. Obesity, morbid, BMI 40.0-49.9 ([facility] [address] I have reviewed/discussed the above normal BMI with the patient. I have recommended the following interventions: dietary management education, guidance, and counseling and encourage exercise . Saray Vences 6. Primary insomnia -     ALPRAZolam (XANAX) 0.25 mg tablet; TAKE  ONE (1)  TABLET(S)  TWICE DAILY AS   NEEDED [place] [employer] Other orders 
-     metFORMIN (GLUCOPHAGE) 500 mg tablet; Take 2 Tabs by mouth two (2) times daily (with meals). -     SITagliptin (JANUVIA) 100 mg tablet; Take 1 Tab by mouth daily. Follow-up Disposition: 
Return in about 4 months (around 6/22/2019) for dm-2.

## 2019-02-22 ENCOUNTER — OFFICE VISIT (OUTPATIENT)
Dept: INTERNAL MEDICINE CLINIC | Age: 59
End: 2019-02-22

## 2019-02-22 VITALS
HEART RATE: 75 BPM | SYSTOLIC BLOOD PRESSURE: 114 MMHG | TEMPERATURE: 98.2 F | OXYGEN SATURATION: 96 % | BODY MASS INDEX: 49.09 KG/M2 | WEIGHT: 260 LBS | HEIGHT: 61 IN | DIASTOLIC BLOOD PRESSURE: 72 MMHG

## 2019-02-22 DIAGNOSIS — I10 ESSENTIAL HYPERTENSION: ICD-10-CM

## 2019-02-22 DIAGNOSIS — E11.65 UNCONTROLLED TYPE 2 DIABETES MELLITUS WITH HYPERGLYCEMIA (HCC): Primary | ICD-10-CM

## 2019-02-22 DIAGNOSIS — J18.9 COMMUNITY ACQUIRED PNEUMONIA OF RIGHT LOWER LOBE OF LUNG: ICD-10-CM

## 2019-02-22 DIAGNOSIS — F51.01 PRIMARY INSOMNIA: ICD-10-CM

## 2019-02-22 DIAGNOSIS — J18.1 UNRESOLVED LOBAR PNEUMONIA (HCC): Primary | ICD-10-CM

## 2019-02-22 DIAGNOSIS — E78.00 PURE HYPERCHOLESTEROLEMIA: ICD-10-CM

## 2019-02-22 DIAGNOSIS — E66.01 OBESITY, MORBID, BMI 40.0-49.9 (HCC): ICD-10-CM

## 2019-02-22 LAB — HBA1C MFR BLD HPLC: 8.2 % (ref 4.8–5.6)

## 2019-02-22 RX ORDER — METFORMIN HYDROCHLORIDE 500 MG/1
1000 TABLET ORAL 2 TIMES DAILY WITH MEALS
Qty: 120 TAB | Refills: 11 | Status: SHIPPED | OUTPATIENT
Start: 2019-02-22 | End: 2020-04-03 | Stop reason: SDUPTHER

## 2019-02-22 RX ORDER — ALPRAZOLAM 0.25 MG/1
TABLET ORAL
Qty: 30 TAB | Refills: 3 | Status: SHIPPED | OUTPATIENT
Start: 2019-02-22 | End: 2020-03-28

## 2019-02-22 NOTE — PROGRESS NOTES
Chief Complaint Patient presents with  Diabetes 3 month follow up  POC  A1C 8.2%  Medication Evaluation Metformin not taking as prescribed. concerns with digestion process. no side effects per patient  Medication Refill  
  xanax

## 2019-10-17 RX ORDER — TRIAMTERENE/HYDROCHLOROTHIAZID 37.5-25 MG
TABLET ORAL
Qty: 60 TAB | Refills: 0 | Status: SHIPPED | OUTPATIENT
Start: 2019-10-17 | End: 2019-11-13 | Stop reason: SDUPTHER

## 2019-12-06 ENCOUNTER — HOSPITAL ENCOUNTER (EMERGENCY)
Age: 59
Discharge: STILL ADMITTED OR ADMINISTRATIVE | End: 2019-12-06
Attending: EMERGENCY MEDICINE
Payer: COMMERCIAL

## 2019-12-06 ENCOUNTER — OFFICE VISIT (OUTPATIENT)
Dept: URGENT CARE | Age: 59
End: 2019-12-06

## 2019-12-06 VITALS
BODY MASS INDEX: 49.47 KG/M2 | HEIGHT: 61 IN | DIASTOLIC BLOOD PRESSURE: 63 MMHG | TEMPERATURE: 98.2 F | HEART RATE: 86 BPM | SYSTOLIC BLOOD PRESSURE: 135 MMHG | OXYGEN SATURATION: 95 % | WEIGHT: 262 LBS | RESPIRATION RATE: 18 BRPM

## 2019-12-06 DIAGNOSIS — R05.9 COUGH: ICD-10-CM

## 2019-12-06 DIAGNOSIS — J40 BRONCHITIS: Primary | ICD-10-CM

## 2019-12-06 PROCEDURE — 75810000275 HC EMERGENCY DEPT VISIT NO LEVEL OF CARE

## 2019-12-06 RX ORDER — APREMILAST 30 MG/1
TABLET, FILM COATED ORAL 2 TIMES DAILY
COMMUNITY
Start: 2019-11-25

## 2019-12-06 RX ORDER — PREDNISONE 5 MG/1
TABLET ORAL
Qty: 21 TAB | Refills: 0 | Status: SHIPPED | OUTPATIENT
Start: 2019-12-06 | End: 2020-04-06 | Stop reason: ALTCHOICE

## 2019-12-06 RX ORDER — DOXYCYCLINE 100 MG/1
100 CAPSULE ORAL 2 TIMES DAILY
Qty: 20 CAP | Refills: 0 | Status: SHIPPED | OUTPATIENT
Start: 2019-12-06 | End: 2019-12-16

## 2019-12-06 RX ORDER — BENZONATATE 200 MG/1
200 CAPSULE ORAL
Qty: 30 CAP | Refills: 0 | Status: SHIPPED | OUTPATIENT
Start: 2019-12-06 | End: 2020-04-06 | Stop reason: ALTCHOICE

## 2019-12-06 NOTE — PATIENT INSTRUCTIONS
Bronchitis: Care Instructions Your Care Instructions Bronchitis is inflammation of the bronchial tubes, which carry air to the lungs. The tubes swell and produce mucus, or phlegm. The mucus and inflamed bronchial tubes make you cough. You may have trouble breathing. Most cases of bronchitis are caused by viruses like those that cause colds. Antibiotics usually do not help and they may be harmful. Bronchitis usually develops rapidly and lasts about 2 to 3 weeks in otherwise healthy people. Follow-up care is a key part of your treatment and safety. Be sure to make and go to all appointments, and call your doctor if you are having problems. It's also a good idea to know your test results and keep a list of the medicines you take. How can you care for yourself at home? · Take all medicines exactly as prescribed. Call your doctor if you think you are having a problem with your medicine. · Get some extra rest. 
· Take an over-the-counter pain medicine, such as acetaminophen (Tylenol), ibuprofen (Advil, Motrin), or naproxen (Aleve) to reduce fever and relieve body aches. Read and follow all instructions on the label. · Do not take two or more pain medicines at the same time unless the doctor told you to. Many pain medicines have acetaminophen, which is Tylenol. Too much acetaminophen (Tylenol) can be harmful. · Take an over-the-counter cough medicine that contains dextromethorphan to help quiet a dry, hacking cough so that you can sleep. Avoid cough medicines that have more than one active ingredient. Read and follow all instructions on the label. · Breathe moist air from a humidifier, hot shower, or sink filled with hot water. The heat and moisture will thin mucus so you can cough it out. · Do not smoke. Smoking can make bronchitis worse. If you need help quitting, talk to your doctor about stop-smoking programs and medicines. These can increase your chances of quitting for good. When should you call for help? Call 911 anytime you think you may need emergency care. For example, call if: 
  · You have severe trouble breathing.  
 Call your doctor now or seek immediate medical care if: 
  · You have new or worse trouble breathing.  
  · You cough up dark brown or bloody mucus (sputum).  
  · You have a new or higher fever.  
  · You have a new rash.  
 Watch closely for changes in your health, and be sure to contact your doctor if: 
  · You cough more deeply or more often, especially if you notice more mucus or a change in the color of your mucus.  
  · You are not getting better as expected. Where can you learn more? Go to http://dillon-champ.info/. Enter H333 in the search box to learn more about \"Bronchitis: Care Instructions. \" Current as of: June 9, 2019 Content Version: 12.2 © 5235-4586 [employer]. Care instructions adapted under license by [employer] (which disclaims liability or warranty for this information). If you have questions about a medical condition or this instruction, always ask your healthcare professional. [address] any warranty or liability for your use of this information.

## 2019-12-07 NOTE — ED NOTES
ARRIVED IN ERROR TO [facility] ED.      0900    I was inadvertently assigned to this patient's treatment team.  I did not see this patient nor did I have any contact with this patient. I had no involvement during the evaluation, treatment or disposition of this patient. I am signing off this note to indicate only why my name appeared in the record.   Baron Archuleta MD

## 2020-03-28 DIAGNOSIS — F51.01 PRIMARY INSOMNIA: ICD-10-CM

## 2020-03-28 RX ORDER — ALPRAZOLAM 0.25 MG/1
TABLET ORAL
Qty: 30 TAB | Refills: 5 | Status: SHIPPED | OUTPATIENT
Start: 2020-03-28 | End: 2020-12-03 | Stop reason: SDUPTHER

## 2020-04-02 RX ORDER — METFORMIN HYDROCHLORIDE 500 MG/1
1000 TABLET ORAL 2 TIMES DAILY WITH MEALS
Qty: 120 TAB | Refills: 11 | OUTPATIENT
Start: 2020-04-02

## 2020-04-02 NOTE — TELEPHONE ENCOUNTER
PCP: Scot Knight MD    Last appt: 2/22/2019  No future appointments. Requested Prescriptions     Pending Prescriptions Disp Refills    metFORMIN (GLUCOPHAGE) 500 mg tablet 120 Tab 11     Sig: Take 2 Tabs by mouth two (2) times daily (with meals).

## 2020-04-03 RX ORDER — METFORMIN HYDROCHLORIDE 500 MG/1
1000 TABLET ORAL 2 TIMES DAILY WITH MEALS
Qty: 120 TAB | Refills: 11 | Status: SHIPPED | OUTPATIENT
Start: 2020-04-03 | End: 2021-05-24 | Stop reason: SDUPTHER

## 2020-04-03 NOTE — TELEPHONE ENCOUNTER
VV scheduled for 4/6 @ 1 with Dr. Criselda Britt. Pt verbalized understanding of information discussed w/ no further questions at this time.

## 2020-04-06 ENCOUNTER — VIRTUAL VISIT (OUTPATIENT)
Dept: INTERNAL MEDICINE CLINIC | Age: 60
End: 2020-04-06

## 2020-04-06 DIAGNOSIS — Z00.00 ROUTINE GENERAL MEDICAL EXAMINATION AT A HEALTH CARE FACILITY: Primary | ICD-10-CM

## 2020-04-06 DIAGNOSIS — E78.00 PURE HYPERCHOLESTEROLEMIA: ICD-10-CM

## 2020-04-06 DIAGNOSIS — I10 ESSENTIAL HYPERTENSION: ICD-10-CM

## 2020-04-06 DIAGNOSIS — E11.65 UNCONTROLLED TYPE 2 DIABETES MELLITUS WITH HYPERGLYCEMIA (HCC): ICD-10-CM

## 2020-04-06 DIAGNOSIS — M19.049 HAND ARTHRITIS: ICD-10-CM

## 2020-04-06 RX ORDER — INSULIN PUMP SYRINGE, 3 ML
EACH MISCELLANEOUS
Qty: 1 KIT | Refills: 0 | Status: SHIPPED | OUTPATIENT
Start: 2020-04-06 | End: 2022-08-29 | Stop reason: ALTCHOICE

## 2020-04-06 RX ORDER — DICLOFENAC SODIUM 10 MG/G
GEL TOPICAL 4 TIMES DAILY
Qty: 100 G | Refills: 5 | Status: SHIPPED | OUTPATIENT
Start: 2020-04-06 | End: 2020-04-29

## 2020-04-06 RX ORDER — LANCETS
EACH MISCELLANEOUS
Qty: 1 EACH | Refills: 11 | Status: SHIPPED | OUTPATIENT
Start: 2020-04-06 | End: 2022-08-29 | Stop reason: ALTCHOICE

## 2020-04-06 NOTE — PROGRESS NOTES
HISTORY OF PRESENT ILLNESS  Jeremie Gould is a 61 y.o. female. HPI     Pursuant to the emergency declaration under the [address] and [place] [address] [facility] authority and the [employer] Act, this Virtual Visit was conducted, with patient's consent, to reduce the patient's risk of exposure to COVID-19 and provide continuity of care for an established patient. Services were provided through a video synchronous discussion virtually to substitute for in-person appointment.       F/u DM-2 HTN HLD obesity insomna and CPE  Last a1c 8.2 LDL 67  januvia was added last year but not taking    fsbs-not checking  No cp sob or sxs of neuropathy  BP at home-none but wnl at  4 months ago    C/o hand arthritis of PIP joints x 1-2 week  Tore achilles heel a few weeks ago--wore a boot  Not able to exercise much  Stable weight  On otezla now for psoriasis   Overdue mammogram and colon screen  See eyeMD yearly-due now      Overdue for mammogram and colon screen  Overdue for foot exam and eye exam    Last OV 2/2019  Due for repeat CXR for RLL asdz-nonsmoker  Last a1c 7.5 LDL 72  a1c today 8.2  Metformin was changed to ER 2000mg every day last OV--but lowered to 2 tabs every day since saw metfomin tabs in the stool  No fsbs readings since last OV  Some poor food choices  Will start again with   No cp   Weight up 7 lbs  Requests xanax refil for insomnia-uses infrequently       Patient Active Problem List    Diagnosis Date Noted    Obesity, morbid ([address] 11/09/2018    Type 2 diabetes mellitus without complication ([address] 86/53/7019    Hepatic steatosis 04/22/2015    HLD (hyperlipidemia) 06/28/2014    Goiter 06/27/2013    Diabetes mellitus ([address] 11/03/2011    Type 1 von Willebrand disease ([address] 02/12/2010    Essential hypertension, benign 02/12/2010    Obesity 02/12/2010    PEGGY (obstructive sleep apnea) 02/12/2010    GERD (gastroesophageal reflux disease) 02/12/2010    Venous insufficiency 02/12/2010    Nephrolithiasis 02/12/2010    Allergic rhinitis 02/12/2010    Asthma 02/12/2010    Carpal tunnel syndrome 02/12/2010    Basal cell carcinoma of nose 02/12/2010    Migraine 02/12/2010     Current Outpatient Medications   Medication Sig Dispense Refill    metFORMIN (GLUCOPHAGE) 500 mg tablet Take 2 Tabs by mouth two (2) times daily (with meals). 120 Tab 11    ALPRAZolam (XANAX) 0.25 mg tablet TAKE 1 TABLET BY MOUTH TWICE DAILY AS NEEDED FOR ANXIETY 30 Tab 5    OTEZLA 30 mg tab       benzonatate (TESSALON) 200 mg capsule Take 1 Cap by mouth three (3) times daily as needed for Cough. 30 Cap 0    predniSONE (STERAPRED) 5 mg dose pack See administration instruction per 5mg dose pack 21 Tab 0    triamterene-hydroCHLOROthiazide (MAXZIDE) 37.5-25 mg per tablet TAKE 2 TABLETS BY MOUTH EVERY DAY 60 Tab 11    pravastatin (PRAVACHOL) 10 mg tablet TAKE 1 TABLET BY MOUTH EVERY DAY 30 Tab 11    SITagliptin (JANUVIA) 100 mg tablet Take 1 Tab by mouth daily. 30 Tab 11    clobetasol (OLUX) 0.05 % topical foam BESSY AA PRN UTD  3    omeprazole (PRILOSEC) 20 mg capsule Take 1 Cap by mouth daily. 30 Cap 6    glucose blood VI test strips (FREESTYLE TEST) strip Freestyle freedom lite test trips  -check fsbs every day  250.00 100 Strip 11    Blood-Glucose Meter monitoring kit Use as directed 1 Kit 0    albuterol (PROVENTIL HFA, VENTOLIN HFA, PROAIR HFA) 90 mcg/actuation inhaler Take 2 Puffs by inhalation every six (6) hours as needed for Wheezing.  1 Inhaler 0     Allergies   Allergen Reactions    Pcn [Penicillins] Unknown (comments)     Given as a child and can't remember reaction      Lab Results   Component Value Date/Time    WBC 8.1 01/23/2018 09:28 AM    HGB 14.4 01/23/2018 09:28 AM    HCT 44.8 01/23/2018 09:28 AM    PLATELET 600 58/38/1991 09:28 AM    MCV 80 01/23/2018 09:28 AM     Lab Results   Component Value Date/Time    Hemoglobin A1c 8.1 (H) 02/09/2017 10:33 AM    Hemoglobin A1c 8.0 (H) 01/11/2016 09:29 AM    Hemoglobin A1c 7.6 (H) 04/10/2015 09:28 AM    Glucose 131 (H) 01/23/2018 09:28 AM    Glucose (POC) 112 (H) 02/03/2013 07:15 AM    Microalb/Creat ratio (ug/mg creat.) 3.9 06/09/2017 09:30 AM    LDL, calculated 72 01/23/2018 09:28 AM    Creatinine 0.81 01/23/2018 09:28 AM      Lab Results   Component Value Date/Time    Cholesterol, total 165 01/23/2018 09:28 AM    Cholesterol (POC) 188 02/26/2013 09:38 AM    HDL Cholesterol 43 01/23/2018 09:28 AM    LDL, calculated 72 01/23/2018 09:28 AM    LDL Cholesterol (POC) 101 02/26/2013 09:38 AM    Triglyceride 251 (H) 01/23/2018 09:28 AM    Triglycerides (POC) 184 02/26/2013 09:38 AM     Lab Results   Component Value Date/Time    GFR est non-AA 81 01/23/2018 09:28 AM    GFR est AA 93 01/23/2018 09:28 AM    Creatinine 0.81 01/23/2018 09:28 AM    BUN 15 01/23/2018 09:28 AM    Sodium 141 01/23/2018 09:28 AM    Potassium 3.9 01/23/2018 09:28 AM    Chloride 94 (L) 01/23/2018 09:28 AM    CO2 29 01/23/2018 09:28 AM        Review of Systems   Constitutional: Negative for chills, fever, malaise/fatigue and weight loss. Eyes: Negative for blurred vision and double vision. Respiratory: Negative for cough and shortness of breath. Cardiovascular: Negative for chest pain and palpitations. Gastrointestinal: Negative for abdominal pain, blood in stool, constipation, diarrhea, melena, nausea and vomiting. Genitourinary: Negative for dysuria, frequency, hematuria and urgency. Musculoskeletal: Negative for back pain, falls, joint pain and myalgias. Neurological: Negative for dizziness, tremors and headaches. Physical Exam  Constitutional:       Appearance: Normal appearance. Pulmonary:      Effort: Pulmonary effort is normal.   Musculoskeletal:      Comments: No OA nodules of hands or synovitos   Neurological:      Mental Status: She is alert.          ASSESSMENT and PLAN  Diagnoses and all orders for this visit: 1. Essential hypertension  -     CBC W/O DIFF  -     METABOLIC PANEL, COMPREHENSIVE    2. Routine general medical examination at a health care facility  -     CBC W/O DIFF  -     AMBER MAMMO BI SCREENING INCL CAD; Future  -     OCCULT BLOOD IMMUNOASSAY,DIAGNOSTIC   Recommended shingrix  3. Pure hypercholesterolemia  -     LIPID PANEL  -     TSH 3RD GENERATION    4. Uncontrolled type 2 diabetes mellitus with hyperglycemia (HCC)  -     HEMOGLOBIN A1C WITH EAG  -     METABOLIC PANEL, COMPREHENSIVE  -     MICROALBUMIN, UR, RAND W/ MICROALB/CREAT RATIO   Work on diet and exercise   Start fsbs monitoring   May been to add oral agent or glp -1  5. Hand arthritis  -     diclofenac (VOLTAREN) 1 % gel; Apply  to affected area four (4) times daily.   6. Insomnia   Takes xanax infrequently q2 weeks

## 2020-04-24 NOTE — PROGRESS NOTES
Cold Symptoms   The history is provided by the patient. This is a new problem. Episode onset: 1 week ago. The problem occurs constantly. The problem has been gradually worsening. The cough is productive of sputum. There has been no fever. Associated symptoms include rhinorrhea, sore throat and shortness of breath. Pertinent negatives include no chest pain, no chills, no sweats, no ear congestion, no ear pain, no headaches, no myalgias, no wheezing, no nausea and no vomiting. Treatments tried: mucinex, tussin. The treatment provided no relief. She is not a smoker. Her past medical history is significant for asthma. Past Medical History:   Diagnosis Date    Arrhythmia     heart murmur    Chronic pain     Contact dermatitis and other eczema, due to unspecified cause     Diabetes (HCC)     GERD (gastroesophageal reflux disease)     Hypertension     Joint pain     & muscle aches    Sleep apnea     zapata not use c-pap        Past Surgical History:   Procedure Laterality Date    BREAST SURGERY PROCEDURE UNLISTED  1984    breast reduction    CHEST SURGERY PROCEDURE UNLISTED  1/29/13     THYROIDECTOMY Retrosternal and Retromandibular!  HX CARPAL TUNNEL RELEASE      HX HYSTERECTOMY  2002?     HX ORTHOPAEDIC  2008, 2009    carpal tunnel    HX ORTHOPAEDIC  2010    torn meniscus    HX THYROIDECTOMY  01/2013    right side    [place] MOHS SURG, ADDL BLOCK      basal cell on nose         Family History   Problem Relation Age of Onset    Hypertension Father     Stroke Father         Social History     Socioeconomic History    Marital status:      Spouse name: Not on file    Number of children: Not on file    Years of education: Not on file    Highest education level: Not on file   Occupational History    Not on file   Social Needs    Financial resource strain: Not on file    Food insecurity:     Worry: Not on file     Inability: Not on file    Transportation needs:     Medical: Not on file Non-medical: Not on file   Tobacco Use    Smoking status: Never Smoker    Smokeless tobacco: Never Used   Substance and Sexual Activity    Alcohol use: Yes     Alcohol/week: 1.7 standard drinks     Types: 1 Glasses of wine, 1 Cans of beer per week     Frequency: 2-4 times a month     Drinks per session: 1 or 2     Comment: rarely, 3x a month    Drug use: Not on file    Sexual activity: Yes     Partners: Female   Lifestyle    Physical activity:     Days per week: Not on file     Minutes per session: Not on file    Stress: Not on file   Relationships    Social connections:     Talks on phone: Not on file     Gets together: Not on file     Attends Hindu service: Not on file     Active member of club or organization: Not on file     Attends meetings of clubs or organizations: Not on file     Relationship status: Not on file    Intimate partner violence:     Fear of current or ex partner: Not on file     Emotionally abused: Not on file     Physically abused: Not on file     Forced sexual activity: Not on file   Other Topics Concern    Not on file   Social History Narrative    Not on file                ALLERGIES: Pcn [penicillins]    Review of Systems   Constitutional: Negative for activity change, appetite change, chills and fever. HENT: Positive for congestion, rhinorrhea and sore throat. Negative for ear pain, sinus pressure, sinus pain and trouble swallowing. Respiratory: Positive for cough and shortness of breath. Negative for wheezing. Cardiovascular: Negative for chest pain and palpitations. Gastrointestinal: Negative for nausea and vomiting. Musculoskeletal: Negative for myalgias. Neurological: Negative for dizziness and headaches. Hematological: Negative for adenopathy.        Vitals:    12/06/19 1350   BP: 135/63   Pulse: 86   Resp: 18   Temp: 98.2 °F (36.8 °C)   SpO2: 95%   Weight: 262 lb (118.8 kg)   Height: 5' 1\" (1.549 m)       Physical Exam  Vitals signs and nursing note reviewed. Constitutional:       General: She is not in acute distress. Appearance: She is well-developed. She is not diaphoretic. HENT:      Right Ear: Tympanic membrane, ear canal and external ear normal.      Left Ear: Tympanic membrane, ear canal and external ear normal.      Nose: Congestion and rhinorrhea present. Right Sinus: No maxillary sinus tenderness or frontal sinus tenderness. Left Sinus: No maxillary sinus tenderness or frontal sinus tenderness. Mouth/Throat:      Pharynx: No oropharyngeal exudate or posterior oropharyngeal erythema. Tonsils: No tonsillar abscesses. Cardiovascular:      Rate and Rhythm: Normal rate and regular rhythm. Heart sounds: Normal heart sounds. Pulmonary:      Effort: Pulmonary effort is normal. No respiratory distress. Breath sounds: Examination of the right-lower field reveals decreased breath sounds. Examination of the left-lower field reveals decreased breath sounds. Decreased breath sounds present. No wheezing or rales. Lymphadenopathy:      Cervical: No cervical adenopathy. Neurological:      Mental Status: She is alert. Psychiatric:         Behavior: Behavior normal.         Thought Content: Thought content normal.         Judgment: Judgment normal.         MDM    ICD-10-CM ICD-9-CM   1. Bronchitis J40 490   2. Cough R05 786.2       Orders Placed This Encounter    XR CHEST PA LAT     Standing Status:   Future     Number of Occurrences:   1     Standing Expiration Date:   1/5/2021     Order Specific Question:   Reason for Exam     Answer:   cough x 1 week    doxycycline (VIBRAMYCIN) 100 mg capsule     Sig: Take 1 Cap by mouth two (2) times a day for 10 days. Dispense:  20 Cap     Refill:  0    benzonatate (TESSALON) 200 mg capsule     Sig: Take 1 Cap by mouth three (3) times daily as needed for Cough.      Dispense:  30 Cap     Refill:  0    predniSONE (STERAPRED) 5 mg dose pack     Sig: See administration instruction per 5mg dose pack     Dispense:  21 Tab     Refill:  0        The patient is to follow up with PCP. If signs and symptoms become worse the pt is to go to the ER. XR Results (most recent):  Results from Appointment encounter on 12/06/19   XR CHEST PA LAT    Narrative Exam:  2 view chest    Indication: Cough x1 week. COMPARISON: February 22, 2019    PA and lateral views demonstrate normal heart size. There is no acute process in  the lung fields. The osseous structures are unremarkable. Impression Impression: No acute process.          Procedures None

## 2020-04-29 ENCOUNTER — HOSPITAL ENCOUNTER (EMERGENCY)
Age: 60
Discharge: HOME | End: 2020-04-29
Attending: EMERGENCY MEDICINE
Payer: COMMERCIAL

## 2020-04-29 ENCOUNTER — APPOINTMENT (OUTPATIENT)
Dept: GENERAL RADIOLOGY | Age: 60
End: 2020-04-29
Attending: EMERGENCY MEDICINE
Payer: COMMERCIAL

## 2020-04-29 VITALS
SYSTOLIC BLOOD PRESSURE: 151 MMHG | DIASTOLIC BLOOD PRESSURE: 71 MMHG | RESPIRATION RATE: 18 BRPM | HEART RATE: 88 BPM | OXYGEN SATURATION: 97 % | TEMPERATURE: 98.2 F

## 2020-04-29 DIAGNOSIS — M54.2 NECK PAIN: Primary | ICD-10-CM

## 2020-04-29 PROCEDURE — 74011250637 HC RX REV CODE- 250/637: Performed by: EMERGENCY MEDICINE

## 2020-04-29 PROCEDURE — 72050 X-RAY EXAM NECK SPINE 4/5VWS: CPT

## 2020-04-29 PROCEDURE — 74011250636 HC RX REV CODE- 250/636: Performed by: EMERGENCY MEDICINE

## 2020-04-29 PROCEDURE — 99283 EMERGENCY DEPT VISIT LOW MDM: CPT

## 2020-04-29 PROCEDURE — 96372 THER/PROPH/DIAG INJ SC/IM: CPT

## 2020-04-29 RX ORDER — CYCLOBENZAPRINE HCL 10 MG
10 TABLET ORAL
Qty: 12 TAB | Refills: 0 | Status: SHIPPED | OUTPATIENT
Start: 2020-04-29 | End: 2021-10-14 | Stop reason: ALTCHOICE

## 2020-04-29 RX ORDER — KETOROLAC TROMETHAMINE 10 MG/1
10 TABLET, FILM COATED ORAL
Qty: 20 TAB | Refills: 0 | Status: SHIPPED | OUTPATIENT
Start: 2020-04-29 | End: 2021-05-11

## 2020-04-29 RX ORDER — KETOROLAC TROMETHAMINE 30 MG/ML
30 INJECTION, SOLUTION INTRAMUSCULAR; INTRAVENOUS
Status: COMPLETED | OUTPATIENT
Start: 2020-04-29 | End: 2020-04-29

## 2020-04-29 RX ORDER — CYCLOBENZAPRINE HCL 10 MG
10 TABLET ORAL
Status: COMPLETED | OUTPATIENT
Start: 2020-04-29 | End: 2020-04-29

## 2020-04-29 RX ADMIN — CYCLOBENZAPRINE HYDROCHLORIDE 10 MG: 10 TABLET, FILM COATED ORAL at 07:58

## 2020-04-29 RX ADMIN — KETOROLAC TROMETHAMINE 30 MG: 30 INJECTION, SOLUTION INTRAMUSCULAR at 08:01

## 2020-04-29 NOTE — ED PROVIDER NOTES
HPI     Pt is a 61 y.o. F with PMH of Diabetes, GERD, HTN here with c/o neck pain x 1 week. The pt denies trauma or known injury. Her pain initially was on right side of neck but has traveled more to the left now. It was tender to touch but this AM feels better. She did take a extra strength tylenol at 6:30 AM.  Over the past week she has seen her chiropractor for help with the pain as well. She did have some adjustments which provided brief relief. No paresthesias. No headache. No fever. No weakness or change in vision. No difficulty swallowing. No other complaints at this time. Past Medical History:   Diagnosis Date    Arrhythmia     heart murmur    Chronic pain     Contact dermatitis and other eczema, due to unspecified cause     Diabetes (HCC)     GERD (gastroesophageal reflux disease)     Hypertension     Joint pain     & muscle aches    Sleep apnea     zapata not use c-pap       Past Surgical History:   Procedure Laterality Date    BREAST SURGERY PROCEDURE UNLISTED  1984    breast reduction    CHEST SURGERY PROCEDURE UNLISTED  1/29/13     THYROIDECTOMY Retrosternal and Retromandibular!  HX CARPAL TUNNEL RELEASE      HX HYSTERECTOMY  2002?     HX ORTHOPAEDIC  2008, 2009    carpal tunnel    HX ORTHOPAEDIC  2010    torn meniscus    HX THYROIDECTOMY  01/2013    right side    [place] MOHS SURG, ADDL BLOCK      basal cell on nose         Family History:   Problem Relation Age of Onset    Hypertension Father     Stroke Father        Social History     Socioeconomic History    Marital status:      Spouse name: Not on file    Number of children: Not on file    Years of education: Not on file    Highest education level: Not on file   Occupational History    Not on file   Social Needs    Financial resource strain: Not on file    Food insecurity     Worry: Not on file     Inability: Not on file    Transportation needs     Medical: Not on file     Non-medical: Not on file   Tobacco Use    Smoking status: Never Smoker    Smokeless tobacco: Never Used   Substance and Sexual Activity    Alcohol use: Yes     Alcohol/week: 1.7 standard drinks     Types: 1 Glasses of wine, 1 Cans of beer per week     Frequency: 2-4 times a month     Drinks per session: 1 or 2     Comment: rarely, 3x a month    Drug use: Not on file    Sexual activity: Yes     Partners: Female   Lifestyle    Physical activity     Days per week: Not on file     Minutes per session: Not on file    Stress: Not on file   Relationships    Social connections     Talks on phone: Not on file     Gets together: Not on file     Attends Roman Catholic service: Not on file     Active member of club or organization: Not on file     Attends meetings of clubs or organizations: Not on file     Relationship status: Not on file    Intimate partner violence     Fear of current or ex partner: Not on file     Emotionally abused: Not on file     Physically abused: Not on file     Forced sexual activity: Not on file   Other Topics Concern    Not on file   Social History Narrative    Not on file         ALLERGIES: Pcn [penicillins]    Review of Systems   Constitutional: Negative for chills, diaphoresis and fever. HENT: Negative for congestion and trouble swallowing. Eyes: Negative for photophobia and visual disturbance. Respiratory: Negative for cough, chest tightness and shortness of breath. Cardiovascular: Negative for chest pain, palpitations and leg swelling. Gastrointestinal: Negative for abdominal pain, diarrhea, nausea and vomiting. Genitourinary: Negative for difficulty urinating, dysuria, flank pain and frequency. Musculoskeletal: Positive for neck pain. Negative for back pain and myalgias. Skin: Negative for rash and wound. Neurological: Negative for dizziness, weakness, light-headedness and headaches. Hematological: Negative for adenopathy. Does not bruise/bleed easily.    Psychiatric/Behavioral: Negative for agitation and confusion. All other systems reviewed and are negative. Vitals:    04/29/20 0800   BP: 151/71   Pulse: 88   Resp: 18   Temp: 98.2 °F (36.8 °C)   SpO2: 97%            Physical Exam  Vitals signs reviewed. Constitutional:       General: She is not in acute distress. Appearance: She is well-developed. She is not diaphoretic. HENT:      Head: Normocephalic and atraumatic. Eyes:      Pupils: Pupils are equal, round, and reactive to light. Neck:      Musculoskeletal: Normal range of motion. Cardiovascular:      Rate and Rhythm: Normal rate. Pulmonary:      Effort: Pulmonary effort is normal.   Abdominal:      General: There is no distension. Musculoskeletal: Normal range of motion. General: No swelling, deformity or signs of injury. Comments: Painful ROM when turning head & neck to left. Able to turn about 45 degrees without pain. Skin:     General: Skin is warm. Capillary Refill: Capillary refill takes less than 2 seconds. Findings: No bruising or erythema. Neurological:      Mental Status: She is alert and oriented to person, place, and time. MDM       Procedures    8:23 AM  Pt's pain is improving and she is able to move neck more with greater ROM. Pt advised of Xray report and advised to follow up with PCP and return to ED should she develop any new or concerning symptoms or worsen. 8:24 AM  Patient's results have been reviewed with them. Patient and/or family have verbally conveyed their understanding and agreement of the patient's signs, symptoms, diagnosis, treatment and prognosis and additionally agree to follow up as recommended or return to the Emergency Room should their condition change prior to follow-up.   Discharge instructions have also been provided to the patient with some educational information regarding their diagnosis as well a list of reasons why they would want to return to the ER prior to their follow-up appointment should their condition change.     Tori Long MD

## 2020-04-29 NOTE — DISCHARGE INSTRUCTIONS
Patient Education        Neck Pain: Care Instructions  Your Care Instructions    You can have neck pain anywhere from the bottom of your head to the top of your shoulders. It can spread to the upper back or arms. Injuries, painting a ceiling, sleeping with your neck twisted, staying in one position for too long, and many other activities can cause neck pain. Most neck pain gets better with home care. Your doctor may recommend medicine to relieve pain or relax your muscles. He or she may suggest exercise and physical therapy to increase flexibility and relieve stress. You may need to wear a special (cervical) collar to support your neck for a day or two. Follow-up care is a key part of your treatment and safety. Be sure to make and go to all appointments, and call your doctor if you are having problems. It's also a good idea to know your test results and keep a list of the medicines you take. How can you care for yourself at home? · Try using a heating pad on a low or medium setting for 15 to 20 minutes every 2 or 3 hours. Try a warm shower in place of one session with the heating pad. · You can also try an ice pack for 10 to 15 minutes every 2 to 3 hours. Put a thin cloth between the ice and your skin. · Take pain medicines exactly as directed. ¨ If the doctor gave you a prescription medicine for pain, take it as prescribed. ¨ If you are not taking a prescription pain medicine, ask your doctor if you can take an over-the-counter medicine. · If your doctor recommends a cervical collar, wear it exactly as directed. When should you call for help? Call your doctor now or seek immediate medical care if:  ? · You have new or worsening numbness in your arms, buttocks or legs. ? · You have new or worsening weakness in your arms or legs. (This could make it hard to stand up.)   ? · You lose control of your bladder or bowels. ? Watch closely for changes in your health, and be sure to contact your doctor if:  ? · Your neck pain is getting worse. ? · You are not getting better after 1 week. ? · You do not get better as expected. Where can you learn more? Go to http://dillon-champ.info/. Enter [id number] in the search box to learn more about \"Neck Pain: Care Instructions. \"  Current as of: March 21, 2017  Content Version: 11.5  © 5904-3039 [employer]. Care instructions adapted under license by [employer] (which disclaims liability or warranty for this information). If you have questions about a medical condition or this instruction, always ask your healthcare professional. [address] any warranty or liability for your use of this information.

## 2020-04-30 ENCOUNTER — PATIENT OUTREACH (OUTPATIENT)
Dept: CASE MANAGEMENT | Age: 60
End: 2020-04-30

## 2020-04-30 NOTE — PROGRESS NOTES
ACM contacted patient for ER follow up. Patient \"states I dont mean to interrupt you but two other people already called me today to follow up\". \"The medicine is helping some I am going to wait a few days before deciding to see my PCP\". ACM offered COVID education, patient interrupts and states I am in the business and dont need the information but thank you for calling. \" ACM offered to call in 2 weeks for follow up and patient declined stating that isn't necessary. No further questions. Will close episode at this time as patient desires no further followup calls.

## 2020-09-08 RX ORDER — PRAVASTATIN SODIUM 10 MG/1
TABLET ORAL
Qty: 30 TAB | Refills: 11 | Status: SHIPPED | OUTPATIENT
Start: 2020-09-08 | End: 2021-05-23 | Stop reason: DRUGHIGH

## 2020-09-10 LAB
ALBUMIN SERPL-MCNC: 4.1 G/DL (ref 3.8–4.9)
ALBUMIN/CREAT UR: 22 MG/G CREAT (ref 0–29)
ALBUMIN/GLOB SERPL: 1.4 {RATIO} (ref 1.2–2.2)
ALP SERPL-CCNC: 86 IU/L (ref 39–117)
ALT SERPL-CCNC: 20 IU/L (ref 0–32)
AST SERPL-CCNC: 19 IU/L (ref 0–40)
BILIRUB SERPL-MCNC: 0.2 MG/DL (ref 0–1.2)
BUN SERPL-MCNC: 17 MG/DL (ref 6–24)
BUN/CREAT SERPL: 17 (ref 9–23)
CALCIUM SERPL-MCNC: 9.6 MG/DL (ref 8.7–10.2)
CHLORIDE SERPL-SCNC: 94 MMOL/L (ref 96–106)
CHOLEST SERPL-MCNC: 168 MG/DL (ref 100–199)
CO2 SERPL-SCNC: 31 MMOL/L (ref 20–29)
CREAT SERPL-MCNC: 1.01 MG/DL (ref 0.57–1)
CREAT UR-MCNC: 56.9 MG/DL
ERYTHROCYTE [DISTWIDTH] IN BLOOD BY AUTOMATED COUNT: 12.5 % (ref 11.7–15.4)
EST. AVERAGE GLUCOSE BLD GHB EST-MCNC: 206 MG/DL
GLOBULIN SER CALC-MCNC: 3 G/DL (ref 1.5–4.5)
GLUCOSE SERPL-MCNC: 207 MG/DL (ref 65–99)
HBA1C MFR BLD: 8.8 % (ref 4.8–5.6)
HCT VFR BLD AUTO: 43.2 % (ref 34–46.6)
HDLC SERPL-MCNC: 43 MG/DL
HGB BLD-MCNC: 13.5 G/DL (ref 11.1–15.9)
LDLC SERPL CALC-MCNC: 72 MG/DL (ref 0–99)
MCH RBC QN AUTO: 23.9 PG (ref 26.6–33)
MCHC RBC AUTO-ENTMCNC: 31.3 G/DL (ref 31.5–35.7)
MCV RBC AUTO: 77 FL (ref 79–97)
MICROALBUMIN UR-MCNC: 12.8 UG/ML
PLATELET # BLD AUTO: 349 X10E3/UL (ref 150–450)
POTASSIUM SERPL-SCNC: 3.8 MMOL/L (ref 3.5–5.2)
PROT SERPL-MCNC: 7.1 G/DL (ref 6–8.5)
RBC # BLD AUTO: 5.65 X10E6/UL (ref 3.77–5.28)
SODIUM SERPL-SCNC: 139 MMOL/L (ref 134–144)
TRIGL SERPL-MCNC: 333 MG/DL (ref 0–149)
TSH SERPL DL<=0.005 MIU/L-ACNC: 1.25 UIU/ML (ref 0.45–4.5)
VLDLC SERPL CALC-MCNC: 53 MG/DL (ref 5–40)
WBC # BLD AUTO: 8.9 X10E3/UL (ref 3.4–10.8)

## 2020-09-15 ENCOUNTER — VIRTUAL VISIT (OUTPATIENT)
Dept: INTERNAL MEDICINE CLINIC | Age: 60
End: 2020-09-15
Payer: COMMERCIAL

## 2020-09-15 DIAGNOSIS — I10 ESSENTIAL HYPERTENSION: ICD-10-CM

## 2020-09-15 DIAGNOSIS — Z12.11 COLON CANCER SCREENING: ICD-10-CM

## 2020-09-15 DIAGNOSIS — E66.9 OBESITY (BMI 30-39.9): ICD-10-CM

## 2020-09-15 DIAGNOSIS — E78.2 MIXED HYPERLIPIDEMIA: ICD-10-CM

## 2020-09-15 DIAGNOSIS — E11.65 UNCONTROLLED TYPE 2 DIABETES MELLITUS WITH HYPERGLYCEMIA (HCC): Primary | ICD-10-CM

## 2020-09-15 PROCEDURE — 99214 OFFICE O/P EST MOD 30 MIN: CPT | Performed by: INTERNAL MEDICINE

## 2020-09-15 PROCEDURE — 3052F HG A1C>EQUAL 8.0%<EQUAL 9.0%: CPT | Performed by: INTERNAL MEDICINE

## 2020-09-15 RX ORDER — TRIAMTERENE/HYDROCHLOROTHIAZID 37.5-25 MG
TABLET ORAL
COMMUNITY
Start: 2020-09-08 | End: 2020-10-15 | Stop reason: SDUPTHER

## 2020-09-15 NOTE — PATIENT INSTRUCTIONS
This is an established visit conducted via telemedicine. The patient has been instructed that this meets HIPAA criteria and acknowledges and agrees to this method of visitation. Joann Herring 
99/17/82 
02:21 AM 
Chief Complaint Patient presents with  Hypertension 4 month follow up  Diabetes 4 month follow up

## 2020-09-15 NOTE — PROGRESS NOTES
HISTORY OF PRESENT ILLNESS  Gretchen Medina is a 61 y.o. female. HPI   Gretchen Medina is a 61 y.o. female being evaluated by a Virtual Visit (video visit) encounter to address concerns as mentioned above. A caregiver was present when appropriate. Due to this being a TeleHealth encounter (During UYDZD-21 public health emergency), evaluation of the following organ systems was limited: Vitals/Constitutional/EENT/Resp/CV/GI//MS/Neuro/Skin/Heme-Lymph-Imm. Pursuant to the emergency declaration under the [facility][note 3[facility], [address] [place] [address] authority and the [employer] Act, this Virtual Visit was conducted with patient's (and/or legal guardian's) consent, to reduce the risk of exposure to COVID-19 and provide necessary medical care. Services were provided through a video synchronous discussion virtually to substitute for in-person encounter.     --Deja Gregory MD on 9/14/2020 at 9:23 PM    An electronic signature was used to authenticate this note.     F/u DM-2 HTN HLD obesity insomna   recent labs a1c 8.8 LDl 72  Home BP-no readings  Less active -not walking or going to gym  fsbs none  Taking metformin only 2 tabs every day-had upset stomach on 4 tabs qd  Last OV    Last a1c 8.2 LDL 72  januvia was added last year but not taking     fsbs-not checking  No cp sob or sxs of neuropathy  BP at home-none but wnl at  4 months ago     C/o hand arthritis of PIP joints x 1-2 week  Tore achilles heel a few weeks ago--wore a boot  Not able to exercise much  Stable weight  On otezla now for psoriasis   Overdue mammogram and colon screen  See eyeMD yearly-due now        Overdue for mammogram and colon screen  Overdue for foot exam and eye exam       Patient Active Problem List    Diagnosis Date Noted    Obesity, morbid ([address] 11/09/2018    Type 2 diabetes mellitus without complication ([facility] [address] 53/44/9643    Hepatic steatosis 04/22/2015    HLD (hyperlipidemia) 06/28/2014    Goiter 06/27/2013    Diabetes mellitus ([facility] [address] 11/03/2011    Type 1 von Willebrand disease ([facility] [address] 02/12/2010    Essential hypertension, benign 02/12/2010    Obesity 02/12/2010    PEGGY (obstructive sleep apnea) 02/12/2010    GERD (gastroesophageal reflux disease) 02/12/2010    Venous insufficiency 02/12/2010    Nephrolithiasis 02/12/2010    Allergic rhinitis 02/12/2010    Asthma 02/12/2010    Carpal tunnel syndrome 02/12/2010    Basal cell carcinoma of nose 02/12/2010    Migraine 02/12/2010     Current Outpatient Medications   Medication Sig Dispense Refill    triamterene-hydroCHLOROthiazide (MAXZIDE) 37.5-25 mg per tablet       semaglutide (OZEMPIC) 0.25 mg/0.2 mL (2 mg/1.5 mL) sub-q pen 0.25 mg by SubCUTAneous route every seven (7) days for 30 days. 1 Box 3    pravastatin (PRAVACHOL) 10 mg tablet TAKE 1 TABLET BY MOUTH EVERY DAY 30 Tab 11    Blood-Glucose Meter monitoring kit Check fsbs bid 250.02 1 Kit 0    glucose blood VI test strips (ASCENSIA AUTODISC VI, ONE TOUCH ULTRA TEST VI) strip Check fsbs bid 250.02 100 Strip 5    lancets misc Check fsbs bid 250.02 1 Each 11    metFORMIN (GLUCOPHAGE) 500 mg tablet Take 2 Tabs by mouth two (2) times daily (with meals). (Patient taking differently: Take 1,000 mg by mouth two (2) times daily (with meals). 2 tabs qd) 120 Tab 11    ALPRAZolam (XANAX) 0.25 mg tablet TAKE 1 TABLET BY MOUTH TWICE DAILY AS NEEDED FOR ANXIETY 30 Tab 5    OTEZLA 30 mg tab       glucose blood VI test strips (FREESTYLE TEST) strip Freestyle freedom lite test trips  -check fsbs every day  250.00 100 Strip 11    Blood-Glucose Meter monitoring kit Use as directed 1 Kit 0    cyclobenzaprine (FLEXERIL) 10 mg tablet Take 1 Tab by mouth three (3) times daily as needed for Muscle Spasm(s). 12 Tab 0    ketorolac (TORADOL) 10 mg tablet Take 1 Tab by mouth every six (6) hours as needed for Pain.  20 Tab 0     Allergies   Allergen Reactions    Pcn [Penicillins] Unknown (comments)     Given as a child and can't remember reaction      Lab Results   Component Value Date/Time    WBC 8.9 09/09/2020 03:54 PM    HGB 13.5 09/09/2020 03:54 PM    HCT 43.2 09/09/2020 03:54 PM    PLATELET 996 45/09/9247 03:54 PM    MCV 77 (L) 09/09/2020 03:54 PM     Lab Results   Component Value Date/Time    Hemoglobin A1c 8.8 (H) 09/09/2020 03:54 PM    Hemoglobin A1c 8.1 (H) 02/09/2017 10:33 AM    Hemoglobin A1c 8.0 (H) 01/11/2016 09:29 AM    Glucose 207 (H) 09/09/2020 03:54 PM    Glucose (POC) 112 (H) 02/03/2013 07:15 AM    Microalb/Creat ratio (ug/mg creat.) 22 09/09/2020 03:54 PM    LDL, calculated 72 01/23/2018 09:28 AM    LDL Chol Calc (NIH) 72 09/09/2020 03:54 PM    Creatinine 1.01 (H) 09/09/2020 03:54 PM      Lab Results   Component Value Date/Time    Cholesterol, total 168 09/09/2020 03:54 PM    Cholesterol (POC) 188 02/26/2013 09:38 AM    HDL Cholesterol 43 09/09/2020 03:54 PM    LDL, calculated 72 01/23/2018 09:28 AM    LDL Chol Calc (NIH) 72 09/09/2020 03:54 PM    LDL Cholesterol (POC) 101 02/26/2013 09:38 AM    Triglyceride 333 (H) 09/09/2020 03:54 PM    Triglycerides (POC) 184 02/26/2013 09:38 AM     Lab Results   Component Value Date/Time    GFR est non-AA 61 09/09/2020 03:54 PM    GFR est AA 70 09/09/2020 03:54 PM    Creatinine 1.01 (H) 09/09/2020 03:54 PM    BUN 17 09/09/2020 03:54 PM    Sodium 139 09/09/2020 03:54 PM    Potassium 3.8 09/09/2020 03:54 PM    Chloride 94 (L) 09/09/2020 03:54 PM    CO2 31 (H) 09/09/2020 03:54 PM     Lab Results   Component Value Date/Time    TSH 1.250 09/09/2020 03:54 PM    T4, Free 1.06 02/26/2013 10:07 AM      Lab Results   Component Value Date/Time    Glucose 207 (H) 09/09/2020 03:54 PM    Glucose (POC) 112 (H) 02/03/2013 07:15 AM         ROS    Physical Exam  Constitutional:       Appearance: Normal appearance. Neurological:      General: No focal deficit present. Mental Status: She is alert.          ASSESSMENT and PLAN  Diagnoses and all orders for this visit:    1. Uncontrolled type 2 diabetes mellitus with hyperglycemia (HCC)   Continue metofrmin 1000 mg qpm   Add ozempic 0.25 mg weekjly   Refer to pharm D for fu 1 month   Work on diet   UTD recent eye exam   Daily foot exam  2. Essential hypertension   bp monitoring  3. Mixed hyperlipidemia   On statin   Low fat diet and exericse for TG lowering  4. Obesity (BMI 30-39. 9)   I have reviewed/discussed the above normal BMI with the patient. I have recommended the following interventions: dietary management education, guidance, and counseling and encourage exercise . Boo Fruit 5. Colon cancer screening   adviused to turn in FIT card  Other orders  -     semaglutide (OZEMPIC) 0.25 mg/0.2 mL (2 mg/1.5 mL) sub-q pen; 0.25 mg by SubCUTAneous route every seven (7) days for 30 days.         rtc 4 months   See pharm D next month

## 2020-10-15 RX ORDER — TRIAMTERENE/HYDROCHLOROTHIAZID 37.5-25 MG
2 TABLET ORAL DAILY
Qty: 180 TAB | Refills: 3 | Status: SHIPPED | OUTPATIENT
Start: 2020-10-15 | End: 2021-07-30

## 2020-10-15 NOTE — TELEPHONE ENCOUNTER
Future Appointments:  Future Appointments   Date Time Provider Bhavesh Mckoy   1/18/2021  2:15 PM Kena Boyd MD [facility] BS AMB        Last Appointment With Me:  9/15/2020     Requested Prescriptions     Pending Prescriptions Disp Refills    triamterene-hydroCHLOROthiazide (MAXZIDE) 37.5-25 mg per tablet 180 Tab      Sig: Take 2 Tabs by mouth daily.

## 2020-12-03 DIAGNOSIS — F51.01 PRIMARY INSOMNIA: ICD-10-CM

## 2020-12-03 RX ORDER — ALPRAZOLAM 0.25 MG/1
TABLET ORAL
Qty: 30 TAB | Refills: 5 | Status: SHIPPED | OUTPATIENT
Start: 2020-12-03 | End: 2021-12-02

## 2021-05-11 ENCOUNTER — VIRTUAL VISIT (OUTPATIENT)
Dept: INTERNAL MEDICINE CLINIC | Age: 61
End: 2021-05-11
Payer: COMMERCIAL

## 2021-05-11 DIAGNOSIS — I10 ESSENTIAL HYPERTENSION: ICD-10-CM

## 2021-05-11 DIAGNOSIS — E78.00 PURE HYPERCHOLESTEROLEMIA: ICD-10-CM

## 2021-05-11 DIAGNOSIS — Z12.11 COLON CANCER SCREENING: ICD-10-CM

## 2021-05-11 DIAGNOSIS — D68.01 TYPE 1 VON WILLEBRAND DISEASE: ICD-10-CM

## 2021-05-11 DIAGNOSIS — E66.01 MORBID OBESITY (HCC): ICD-10-CM

## 2021-05-11 DIAGNOSIS — E11.9 TYPE 2 DIABETES MELLITUS WITHOUT COMPLICATION, WITHOUT LONG-TERM CURRENT USE OF INSULIN (HCC): Primary | ICD-10-CM

## 2021-05-11 DIAGNOSIS — K76.0 HEPATIC STEATOSIS: ICD-10-CM

## 2021-05-11 PROCEDURE — 99214 OFFICE O/P EST MOD 30 MIN: CPT | Performed by: INTERNAL MEDICINE

## 2021-05-11 NOTE — PATIENT INSTRUCTIONS
This is an established visit conducted via telemedicine. The patient has been instructed that this meets HIPAA criteria and acknowledges and agrees to this method of visitation.  
 
Sol Ortiz [place] 
14/13/58 
3:98 PM

## 2021-05-11 NOTE — PROGRESS NOTES
HISTORY OF PRESENT ILLNESS  Vito Daly is a 61 y.o. female. HPI   Vito Daly, was evaluated through a synchronous (real-time) audio-video encounter. The patient (or guardian if applicable) is aware that this is a billable service. Verbal consent to proceed has been obtained within the past 12 months. The visit was conducted pursuant to the emergency declaration under the [facility] [address] and the [employer] Act. Patient identification was verified, and a caregiver was present when appropriate. The patient was located in a state where the provider was credentialed to provide care.     --Steve Bills MD on 5/11/2021 at 3:08 PM    An electronic signature was used to authenticate this note.         F/u DM-2 HTN Dinora Parr MD for psoriasis on otezla  Last a1c 8.8 LDL 72   ozempic was added last OV  Overdue for mammogram and colon screen  Not on aspirin due to Nancy [address]    recent labs a1c 8.8 LDl 72  Home BP-no readings  Less active -not walking or going to gym  fsbs none  Taking metformin only 2 tabs every day-had upset stomach on 4 tabs qd    Patient Active Problem List    Diagnosis Date Noted    Obesity, morbid ([address] 11/09/2018    Type 2 diabetes mellitus without complication ([address] 75/20/4289    Hepatic steatosis 04/22/2015    HLD (hyperlipidemia) 06/28/2014    Goiter 06/27/2013    Diabetes mellitus ([address] 11/03/2011    Type 1 von Willebrand disease ([address] 02/12/2010    Essential hypertension, benign 02/12/2010    Obesity 02/12/2010    PEGGY (obstructive sleep apnea) 02/12/2010    GERD (gastroesophageal reflux disease) 02/12/2010    Venous insufficiency 02/12/2010    Nephrolithiasis 02/12/2010    Allergic rhinitis 02/12/2010    Asthma 02/12/2010    Carpal tunnel syndrome 02/12/2010    Basal cell carcinoma of nose 02/12/2010    Migraine 02/12/2010 Current Outpatient Medications   Medication Sig Dispense Refill    ALPRAZolam (XANAX) 0.25 mg tablet TAKE 1 TABLET BY MOUTH TWICE DAILY AS NEEDED FOR ANXIETY 30 Tab 5    triamterene-hydroCHLOROthiazide (MAXZIDE) 37.5-25 mg per tablet Take 2 Tabs by mouth daily. 180 Tab 3    pravastatin (PRAVACHOL) 10 mg tablet TAKE 1 TABLET BY MOUTH EVERY DAY 30 Tab 11    cyclobenzaprine (FLEXERIL) 10 mg tablet Take 1 Tab by mouth three (3) times daily as needed for Muscle Spasm(s). 12 Tab 0    ketorolac (TORADOL) 10 mg tablet Take 1 Tab by mouth every six (6) hours as needed for Pain. 20 Tab 0    Blood-Glucose Meter monitoring kit Check fsbs bid 250.02 1 Kit 0    glucose blood VI test strips (ASCENSIA AUTODISC VI, ONE TOUCH ULTRA TEST VI) strip Check fsbs bid 250.02 100 Strip 5    lancets misc Check fsbs bid 250.02 1 Each 11    metFORMIN (GLUCOPHAGE) 500 mg tablet Take 2 Tabs by mouth two (2) times daily (with meals). (Patient taking differently: Take 1,000 mg by mouth two (2) times daily (with meals).  2 tabs qd) 120 Tab 11    OTEZLA 30 mg tab       glucose blood VI test strips (FREESTYLE TEST) strip Freestyle freedom lite test trips  -check fsbs every day  250.00 100 Strip 11    Blood-Glucose Meter monitoring kit Use as directed 1 Kit 0     Allergies   Allergen Reactions    Pcn [Penicillins] Unknown (comments)     Given as a child and can't remember reaction      Lab Results   Component Value Date/Time    WBC 8.9 09/09/2020 03:54 PM    HGB 13.5 09/09/2020 03:54 PM    HCT 43.2 09/09/2020 03:54 PM    PLATELET 525 77/92/4753 03:54 PM    MCV 77 (L) 09/09/2020 03:54 PM     Lab Results   Component Value Date/Time    Hemoglobin A1c 8.8 (H) 09/09/2020 03:54 PM    Hemoglobin A1c 8.1 (H) 02/09/2017 10:33 AM    Hemoglobin A1c 8.0 (H) 01/11/2016 09:29 AM    Glucose 207 (H) 09/09/2020 03:54 PM    Glucose (POC) 112 (H) 02/03/2013 07:15 AM    Microalb/Creat ratio (ug/mg creat.) 22 09/09/2020 03:54 PM    LDL, calculated 72 09/09/2020 03:54 PM    LDL, calculated 72 01/23/2018 09:28 AM    Creatinine 1.01 (H) 09/09/2020 03:54 PM      Lab Results   Component Value Date/Time    Cholesterol, total 168 09/09/2020 03:54 PM    Cholesterol (POC) 188 02/26/2013 09:38 AM    HDL Cholesterol 43 09/09/2020 03:54 PM    LDL, calculated 72 09/09/2020 03:54 PM    LDL, calculated 72 01/23/2018 09:28 AM    LDL Cholesterol (POC) 101 02/26/2013 09:38 AM    Triglyceride 333 (H) 09/09/2020 03:54 PM    Triglycerides (POC) 184 02/26/2013 09:38 AM     Lab Results   Component Value Date/Time    GFR est non-AA 61 09/09/2020 03:54 PM    GFR est AA 70 09/09/2020 03:54 PM    Creatinine 1.01 (H) 09/09/2020 03:54 PM    BUN 17 09/09/2020 03:54 PM    Sodium 139 09/09/2020 03:54 PM    Potassium 3.8 09/09/2020 03:54 PM    Chloride 94 (L) 09/09/2020 03:54 PM    CO2 31 (H) 09/09/2020 03:54 PM        ROS    Physical Exam  Constitutional:       Appearance: Normal appearance. She is obese. Pulmonary:      Effort: Pulmonary effort is normal.   Neurological:      General: No focal deficit present. Mental Status: She is alert. Psychiatric:         Mood and Affect: Mood normal.         Behavior: Behavior normal.         Thought Content: Thought content normal.         ASSESSMENT and PLAN  Diagnoses and all orders for this visit:    1. Type 2 diabetes mellitus without complication, without long-term current use of insulin (HCC)  -     HEMOGLOBIN A1C WITH EAG; Future  -     METABOLIC PANEL, COMPREHENSIVE; Future   advised fsbs checks   Discussed adding 2nd oral agent  such as amaryl   Will refer to Pharm D if a1c is  above goal  2. Essential hypertension  -     METABOLIC PANEL, COMPREHENSIVE; Future   bp controlled  3. Pure hypercholesterolemia  -     METABOLIC PANEL, COMPREHENSIVE; Future  -     LIPID PANEL; Future   On statin  4. Morbid obesity ([address]   . I have reviewed/discussed the above normal BMI with the patient.   I have recommended the following interventions: dietary management education, guidance, and counseling and encourage exercise . Radha Mclaughlin 5. Type 1 von Willebrand disease ([facility]   No aspirin  6. Hepatic steatosis   Weight reduction     CMP  7. Colon cancer screening  -     REFERRAL TO GASTROENTEROLOGY      Follow-up and Dispositions    · Return in about 4 months (around 9/11/2021) for fu DM-2 HTN HLD obesity Hepatic steatosis.

## 2021-05-22 LAB
ALBUMIN SERPL-MCNC: 4.2 G/DL (ref 3.8–4.9)
ALBUMIN/GLOB SERPL: 1.4 {RATIO} (ref 1.2–2.2)
ALP SERPL-CCNC: 91 IU/L (ref 48–121)
ALT SERPL-CCNC: 25 IU/L (ref 0–32)
AST SERPL-CCNC: 26 IU/L (ref 0–40)
BILIRUB SERPL-MCNC: 0.3 MG/DL (ref 0–1.2)
BUN SERPL-MCNC: 15 MG/DL (ref 8–27)
BUN/CREAT SERPL: 18 (ref 12–28)
CALCIUM SERPL-MCNC: 9.5 MG/DL (ref 8.7–10.3)
CHLORIDE SERPL-SCNC: 97 MMOL/L (ref 96–106)
CHOLEST SERPL-MCNC: 181 MG/DL (ref 100–199)
CO2 SERPL-SCNC: 27 MMOL/L (ref 20–29)
CREAT SERPL-MCNC: 0.82 MG/DL (ref 0.57–1)
EST. AVERAGE GLUCOSE BLD GHB EST-MCNC: 212 MG/DL
GLOBULIN SER CALC-MCNC: 2.9 G/DL (ref 1.5–4.5)
GLUCOSE SERPL-MCNC: 206 MG/DL (ref 65–99)
HBA1C MFR BLD: 9 % (ref 4.8–5.6)
HDLC SERPL-MCNC: 44 MG/DL
LDLC SERPL CALC-MCNC: 102 MG/DL (ref 0–99)
POTASSIUM SERPL-SCNC: 4.5 MMOL/L (ref 3.5–5.2)
PROT SERPL-MCNC: 7.1 G/DL (ref 6–8.5)
SODIUM SERPL-SCNC: 140 MMOL/L (ref 134–144)
TRIGL SERPL-MCNC: 204 MG/DL (ref 0–149)
VLDLC SERPL CALC-MCNC: 35 MG/DL (ref 5–40)

## 2021-05-23 RX ORDER — GLIMEPIRIDE 4 MG/1
4 TABLET ORAL
Qty: 90 TABLET | Refills: 1 | Status: SHIPPED | OUTPATIENT
Start: 2021-05-23 | End: 2021-11-15

## 2021-05-23 RX ORDER — PRAVASTATIN SODIUM 20 MG/1
20 TABLET ORAL
Qty: 90 TABLET | Refills: 3 | Status: SHIPPED | OUTPATIENT
Start: 2021-05-23 | End: 2022-05-10

## 2021-05-23 NOTE — PROGRESS NOTES
Discussed labhs--add amaryl 4 mg every day to metformin, work on diet and get appt with Pharm D for uncontrolled DM-2.  Also , increase pravastatin dose to 20 mg qd

## 2021-05-24 RX ORDER — METFORMIN HYDROCHLORIDE 500 MG/1
500 TABLET ORAL 2 TIMES DAILY WITH MEALS
Qty: 120 TABLET | Refills: 3 | Status: SHIPPED | OUTPATIENT
Start: 2021-05-24 | End: 2022-02-02

## 2021-05-24 NOTE — TELEPHONE ENCOUNTER
Future Appointments:  Future Appointments   Date Time Provider Bhavesh Downsi   9/10/2021  9:30 AM Brandon Oaklye MD [facility] BS AMB        Last Appointment With Me:  5/11/2021     Requested Prescriptions     Pending Prescriptions Disp Refills    metFORMIN (GLUCOPHAGE) 500 mg tablet 120 Tablet 3     Sig: Take 1 Tablet by mouth two (2) times daily (with meals).  2 tabs qd

## 2021-06-09 ENCOUNTER — TELEPHONE (OUTPATIENT)
Dept: INTERNAL MEDICINE CLINIC | Age: 61
End: 2021-06-09

## 2021-06-09 NOTE — TELEPHONE ENCOUNTER
Pharmacy Progress Note - Telephone Call    Ms. Chiquis Ash 61 y.o. was contacted via an outbound telephone call regarding her diabetes management today. A voicemail was left for patient to return my call. Thank you,  Thu Bernett Cogan, MirtaD, BCACP, [facility] [address] in place:  Yes   Recommendation Provided To: Patient/Caregiver: 1 via Telephone

## 2021-06-09 NOTE — TELEPHONE ENCOUNTER
Pharmacy Progress Note - Telephone Encounter    S/O: Ms. Katarzyna Aparicio 61 y.o. female, referred by Dr. Ann Fink MD, was contacted via an outbound telephone call to discuss her diabetes management today. Verified patients identifiers (name & ) per HIPAA policy.     - S2A increased from 8.8% to 9% (May 2021)  - Started glimepiride 4 mg daily  - Taking metformin 1000 mg daily    - LDL increased to 102. Started pravastatin 20 mg daily.    - Not checking SMBGs. Not exercising at this time.  - Reports hx of DSME.   \"Knows what I need to do\"    - Traveling to [facility] next week until .    - Reports colonoscopy scheduled for 21    Wt Readings from Last 3 Encounters:   19 262 lb (118.8 kg)   19 260 lb (117.9 kg)   18 253 lb (114.8 kg)     Lab Results   Component Value Date/Time    Sodium 140 2021 08:58 AM    Potassium 4.5 2021 08:58 AM    Chloride 97 2021 08:58 AM    CO2 27 2021 08:58 AM    Anion gap 12 2013 05:55 PM    Glucose 206 (H) 2021 08:58 AM    BUN 15 2021 08:58 AM    Creatinine 0.82 2021 08:58 AM    BUN/Creatinine ratio 18 2021 08:58 AM    GFR est AA 90 2021 08:58 AM    GFR est non-AA 78 2021 08:58 AM    Calcium 9.5 2021 08:58 AM     Lab Results   Component Value Date/Time    Cholesterol, total 181 2021 08:58 AM    Cholesterol (POC) 188 2013 09:38 AM    HDL Cholesterol 44 2021 08:58 AM    HDL Cholesterol (POC) 49 2013 09:38 AM    LDL Cholesterol (POC) 101 2013 09:38 AM    LDL, calculated 102 (H) 2021 08:58 AM    LDL, calculated 72 2018 09:28 AM    VLDL, calculated 35 2021 08:58 AM    VLDL, calculated 50 (H) 2018 09:28 AM    Triglyceride 204 (H) 2021 08:58 AM    Triglycerides (POC) 184 2013 09:38 AM     Lab Results   Component Value Date/Time    Microalb/Creat ratio (ug/mg creat.) 2020 03:54 PM     Lab Results   Component Value Date/Time    Hemoglobin A1c 9.0 (H) 05/21/2021 08:58 AM    Hemoglobin A1c 8.8 (H) 09/09/2020 03:54 PM    Hemoglobin A1c 8.1 (H) 02/09/2017 10:33 AM     CrCl cannot be calculated (Unknown ideal weight. ). A/P:  - Recommend for patient to check SMBG fasting and pre-HS . Will review at the next follow up  - Continue metformin 1 gm daily and glimepiride 4 mg daily for now. - Given upcoming travels, scheduled for 7/6/21 for f/u. - Patient endorses understanding to the provided information. All questions answered at this time. There are no discontinued medications. No orders of the defined types were placed in this encounter. Thank you for the consult,  Katey Layne, PharmD, BCACP, [address] in place:  Yes   Recommendation Provided To: Patient/Caregiver: 1 via Telephone   Time Spent (min): 10

## 2021-07-29 ENCOUNTER — HOSPITAL ENCOUNTER (OUTPATIENT)
Age: 61
Setting detail: OBSERVATION
Discharge: HOME | End: 2021-07-30
Attending: STUDENT IN AN ORGANIZED HEALTH CARE EDUCATION/TRAINING PROGRAM | Admitting: HOSPITALIST
Payer: COMMERCIAL

## 2021-07-29 ENCOUNTER — APPOINTMENT (OUTPATIENT)
Dept: GENERAL RADIOLOGY | Age: 61
End: 2021-07-29
Attending: STUDENT IN AN ORGANIZED HEALTH CARE EDUCATION/TRAINING PROGRAM
Payer: COMMERCIAL

## 2021-07-29 DIAGNOSIS — E11.69 TYPE 2 DIABETES MELLITUS WITH OTHER SPECIFIED COMPLICATION, WITHOUT LONG-TERM CURRENT USE OF INSULIN (HCC): ICD-10-CM

## 2021-07-29 DIAGNOSIS — R07.9 CHEST PAIN, UNSPECIFIED TYPE: Primary | ICD-10-CM

## 2021-07-29 DIAGNOSIS — R42 DIZZINESS: ICD-10-CM

## 2021-07-29 LAB
ALBUMIN SERPL-MCNC: 3.6 G/DL (ref 3.5–5)
ALBUMIN/GLOB SERPL: 0.9 {RATIO} (ref 1.1–2.2)
ALP SERPL-CCNC: 88 U/L (ref 45–117)
ALT SERPL-CCNC: 39 U/L (ref 12–78)
ANION GAP SERPL CALC-SCNC: 9 MMOL/L (ref 5–15)
AST SERPL-CCNC: 39 U/L (ref 15–37)
ATRIAL RATE: 88 BPM
BASOPHILS # BLD: 0 K/UL (ref 0–0.1)
BASOPHILS NFR BLD: 0 % (ref 0–1)
BILIRUB SERPL-MCNC: 0.6 MG/DL (ref 0.2–1)
BUN SERPL-MCNC: 12 MG/DL (ref 6–20)
BUN/CREAT SERPL: 13 (ref 12–20)
CALCIUM SERPL-MCNC: 9.1 MG/DL (ref 8.5–10.1)
CALCULATED P AXIS, ECG09: 38 DEGREES
CALCULATED R AXIS, ECG10: -82 DEGREES
CALCULATED T AXIS, ECG11: 31 DEGREES
CHLORIDE SERPL-SCNC: 99 MMOL/L (ref 97–108)
CO2 SERPL-SCNC: 30 MMOL/L (ref 21–32)
COMMENT, HOLDF: NORMAL
CREAT SERPL-MCNC: 0.9 MG/DL (ref 0.55–1.02)
DIAGNOSIS, 93000: NORMAL
DIFFERENTIAL METHOD BLD: ABNORMAL
EOSINOPHIL # BLD: 0.2 K/UL (ref 0–0.4)
EOSINOPHIL NFR BLD: 2 % (ref 0–7)
ERYTHROCYTE [DISTWIDTH] IN BLOOD BY AUTOMATED COUNT: 13.5 % (ref 11.5–14.5)
GLOBULIN SER CALC-MCNC: 4.2 G/DL (ref 2–4)
GLUCOSE SERPL-MCNC: 152 MG/DL (ref 65–100)
HCT VFR BLD AUTO: 46.8 % (ref 35–47)
HGB BLD-MCNC: 14.6 G/DL (ref 11.5–16)
IMM GRANULOCYTES # BLD AUTO: 0 K/UL (ref 0–0.04)
IMM GRANULOCYTES NFR BLD AUTO: 0 % (ref 0–0.5)
LIPASE SERPL-CCNC: 122 U/L (ref 73–393)
LYMPHOCYTES # BLD: 2.1 K/UL (ref 0.8–3.5)
LYMPHOCYTES NFR BLD: 23 % (ref 12–49)
MCH RBC QN AUTO: 24.6 PG (ref 26–34)
MCHC RBC AUTO-ENTMCNC: 31.2 G/DL (ref 30–36.5)
MCV RBC AUTO: 78.9 FL (ref 80–99)
MONOCYTES # BLD: 0.6 K/UL (ref 0–1)
MONOCYTES NFR BLD: 7 % (ref 5–13)
NEUTS SEG # BLD: 6.1 K/UL (ref 1.8–8)
NEUTS SEG NFR BLD: 68 % (ref 32–75)
NRBC # BLD: 0 K/UL (ref 0–0.01)
NRBC BLD-RTO: 0 PER 100 WBC
P-R INTERVAL, ECG05: 176 MS
PLATELET # BLD AUTO: 341 K/UL (ref 150–400)
PMV BLD AUTO: 9.8 FL (ref 8.9–12.9)
POTASSIUM SERPL-SCNC: 3.4 MMOL/L (ref 3.5–5.1)
PROT SERPL-MCNC: 7.8 G/DL (ref 6.4–8.2)
Q-T INTERVAL, ECG07: 380 MS
QRS DURATION, ECG06: 82 MS
QTC CALCULATION (BEZET), ECG08: 459 MS
RBC # BLD AUTO: 5.93 M/UL (ref 3.8–5.2)
SAMPLES BEING HELD,HOLD: NORMAL
SODIUM SERPL-SCNC: 138 MMOL/L (ref 136–145)
TROPONIN I SERPL-MCNC: <0.05 NG/ML
TROPONIN I SERPL-MCNC: <0.05 NG/ML
VENTRICULAR RATE, ECG03: 88 BPM
WBC # BLD AUTO: 9 K/UL (ref 3.6–11)

## 2021-07-29 PROCEDURE — 85025 COMPLETE CBC W/AUTO DIFF WBC: CPT

## 2021-07-29 PROCEDURE — 71046 X-RAY EXAM CHEST 2 VIEWS: CPT

## 2021-07-29 PROCEDURE — 99284 EMERGENCY DEPT VISIT MOD MDM: CPT

## 2021-07-29 PROCEDURE — 99218 HC RM OBSERVATION: CPT

## 2021-07-29 PROCEDURE — 36415 COLL VENOUS BLD VENIPUNCTURE: CPT

## 2021-07-29 PROCEDURE — 84484 ASSAY OF TROPONIN QUANT: CPT

## 2021-07-29 PROCEDURE — 93005 ELECTROCARDIOGRAM TRACING: CPT

## 2021-07-29 PROCEDURE — 74011250637 HC RX REV CODE- 250/637: Performed by: STUDENT IN AN ORGANIZED HEALTH CARE EDUCATION/TRAINING PROGRAM

## 2021-07-29 PROCEDURE — 83690 ASSAY OF LIPASE: CPT

## 2021-07-29 PROCEDURE — 80053 COMPREHEN METABOLIC PANEL: CPT

## 2021-07-29 RX ORDER — ONDANSETRON 4 MG/1
4 TABLET, ORALLY DISINTEGRATING ORAL
Status: DISCONTINUED | OUTPATIENT
Start: 2021-07-29 | End: 2021-07-30 | Stop reason: HOSPADM

## 2021-07-29 RX ORDER — ENOXAPARIN SODIUM 100 MG/ML
40 INJECTION SUBCUTANEOUS DAILY
Status: DISCONTINUED | OUTPATIENT
Start: 2021-07-30 | End: 2021-07-30 | Stop reason: HOSPADM

## 2021-07-29 RX ORDER — SODIUM CHLORIDE 0.9 % (FLUSH) 0.9 %
5-40 SYRINGE (ML) INJECTION AS NEEDED
Status: DISCONTINUED | OUTPATIENT
Start: 2021-07-29 | End: 2021-07-30 | Stop reason: HOSPADM

## 2021-07-29 RX ORDER — ACETAMINOPHEN 650 MG/1
650 SUPPOSITORY RECTAL
Status: DISCONTINUED | OUTPATIENT
Start: 2021-07-29 | End: 2021-07-30 | Stop reason: HOSPADM

## 2021-07-29 RX ORDER — SODIUM CHLORIDE 0.9 % (FLUSH) 0.9 %
5-40 SYRINGE (ML) INJECTION EVERY 8 HOURS
Status: DISCONTINUED | OUTPATIENT
Start: 2021-07-30 | End: 2021-07-30 | Stop reason: HOSPADM

## 2021-07-29 RX ORDER — MAGNESIUM SULFATE 100 %
4 CRYSTALS MISCELLANEOUS AS NEEDED
Status: DISCONTINUED | OUTPATIENT
Start: 2021-07-29 | End: 2021-07-30 | Stop reason: HOSPADM

## 2021-07-29 RX ORDER — DEXTROSE 50 % IN WATER (D50W) INTRAVENOUS SYRINGE
12.5-25 AS NEEDED
Status: DISCONTINUED | OUTPATIENT
Start: 2021-07-29 | End: 2021-07-30 | Stop reason: HOSPADM

## 2021-07-29 RX ORDER — POLYETHYLENE GLYCOL 3350 17 G/17G
17 POWDER, FOR SOLUTION ORAL DAILY PRN
Status: DISCONTINUED | OUTPATIENT
Start: 2021-07-29 | End: 2021-07-30 | Stop reason: HOSPADM

## 2021-07-29 RX ORDER — ACETAMINOPHEN 325 MG/1
650 TABLET ORAL
Status: DISCONTINUED | OUTPATIENT
Start: 2021-07-29 | End: 2021-07-30 | Stop reason: HOSPADM

## 2021-07-29 RX ORDER — ACETAMINOPHEN 325 MG/1
650 TABLET ORAL
Status: COMPLETED | OUTPATIENT
Start: 2021-07-29 | End: 2021-07-29

## 2021-07-29 RX ORDER — MAG HYDROX/ALUMINUM HYD/SIMETH 200-200-20
15 SUSPENSION, ORAL (FINAL DOSE FORM) ORAL
Status: DISCONTINUED | OUTPATIENT
Start: 2021-07-29 | End: 2021-07-30 | Stop reason: HOSPADM

## 2021-07-29 RX ORDER — ONDANSETRON 2 MG/ML
4 INJECTION INTRAMUSCULAR; INTRAVENOUS
Status: DISCONTINUED | OUTPATIENT
Start: 2021-07-29 | End: 2021-07-30 | Stop reason: HOSPADM

## 2021-07-29 RX ORDER — INSULIN LISPRO 100 [IU]/ML
INJECTION, SOLUTION INTRAVENOUS; SUBCUTANEOUS
Status: DISCONTINUED | OUTPATIENT
Start: 2021-07-30 | End: 2021-07-30 | Stop reason: HOSPADM

## 2021-07-29 RX ADMIN — ACETAMINOPHEN 650 MG: 325 TABLET ORAL at 17:34

## 2021-07-29 NOTE — ED NOTES
TRANSFER - OUT REPORT:    Verbal report given to Jodie RN(name) on Elizabeth Peterson  being transferred to 214(unit) for routine progression of care       Report consisted of patients Situation, Background, Assessment and   Recommendations(SBAR). Information from the following report(s) SBAR, Kardex, ED Summary, MAR, Recent Results and Cardiac Rhythm nsr was reviewed with the receiving nurse. Lines:       Opportunity for questions and clarification was provided.       Patient transported with:   Monitor

## 2021-07-29 NOTE — ED PROVIDER NOTES
Patient is a 44-year-old female history of a heart murmur, chronic pain, diabetes and hypertension presenting today with dizziness and chest discomfort. She reports that she has had intermittent episodes of a \"sensation\" in her upper abdomen and mid chest which is nonradiating. She has trouble describing this. When this is present she gets lightheaded and feels as if she is going to pass out. She does not think that she has had shortness of breath. She denies focal weakness or numbness. She was supposed to go to [place] for a reunion but these episodes were happening so frequently that she turned around, spent the night in [place] and then came home today. She has no known cardiac disease such as a heart attack. She denies any headache. Right now she is feeling \"out of it\" and \"foggy\". Past Medical History:   Diagnosis Date    Arrhythmia     heart murmur    Chronic pain     Contact dermatitis and other eczema, due to unspecified cause     Diabetes (HCC)     GERD (gastroesophageal reflux disease)     Hypertension     Joint pain     & muscle aches    Sleep apnea     zapata not use c-pap       Past Surgical History:   Procedure Laterality Date    HX CARPAL TUNNEL RELEASE      HX HYSTERECTOMY  2002?  HX ORTHOPAEDIC  2008, 2009    carpal tunnel    HX ORTHOPAEDIC  2010    torn meniscus    HX THYROIDECTOMY  01/2013    right side    SC BREAST SURGERY PROCEDURE UNLISTED  1984    breast reduction    SC CHEST SURGERY PROCEDURE UNLISTED  1/29/13     THYROIDECTOMY Retrosternal and Retromandibular!     SC MOHS SURG, ADDL BLOCK      basal cell on nose         Family History:   Problem Relation Age of Onset    Hypertension Father     Stroke Father        Social History     Socioeconomic History    Marital status:      Spouse name: Not on file    Number of children: Not on file    Years of education: Not on file    Highest education level: Not on file   Occupational History    Not on file   Tobacco Use    Smoking status: Never Smoker    Smokeless tobacco: Never Used   Substance and Sexual Activity    Alcohol use: Yes     Alcohol/week: 1.7 standard drinks     Types: 1 Glasses of wine, 1 Cans of beer per week     Comment: rarely, 3x a month    Drug use: Not on file    Sexual activity: Yes     Partners: Female   Other Topics Concern    Not on file   Social History Narrative    Not on file     Social Determinants of Health     Financial Resource Strain:     Difficulty of Paying Living Expenses:    Food Insecurity:     Worried About Running Out of Food in the Last Year:     Ran Out of Food in the Last Year:    Transportation Needs:     Lack of Transportation (Medical):  Lack of Transportation (Non-Medical):    Physical Activity:     Days of Exercise per Week:     Minutes of Exercise per Session:    Stress:     Feeling of Stress :    Social Connections:     Frequency of Communication with Friends and Family:     Frequency of Social Gatherings with Friends and Family:     Attends Shinto Services:     Active Member of Clubs or Organizations:     Attends Club or Organization Meetings:     Marital Status:    Intimate Partner Violence:     Fear of Current or Ex-Partner:     Emotionally Abused:     Physically Abused:     Sexually Abused: ALLERGIES: Pcn [penicillins]    Review of Systems   Constitutional: Negative for chills and fever. HENT: Negative for congestion and rhinorrhea. Eyes: Negative for redness and visual disturbance. Respiratory: Negative for cough and shortness of breath. Cardiovascular: Positive for chest pain. Negative for leg swelling. Gastrointestinal: Positive for abdominal pain. Negative for diarrhea, nausea and vomiting. Genitourinary: Negative for dysuria, flank pain, frequency, hematuria and urgency. Musculoskeletal: Negative for arthralgias, back pain, myalgias and neck pain. Skin: Negative for rash and wound. Allergic/Immunologic: Negative for immunocompromised state. Neurological: Positive for dizziness. Negative for headaches. Vitals:    07/29/21 1329   BP: (!) 154/106   Pulse: 99   Resp: 21   SpO2: 97%   Weight: 114.8 kg (253 lb 1.4 oz)   Height: 5' 1\" (1.549 m)            Physical Exam  Vitals and nursing note reviewed. Constitutional:       General: She is not in acute distress. Appearance: She is well-developed. She is not diaphoretic. HENT:      Head: Normocephalic. Mouth/Throat:      Pharynx: No oropharyngeal exudate. Eyes:      General:         Right eye: No discharge. Left eye: No discharge. Pupils: Pupils are equal, round, and reactive to light. Cardiovascular:      Rate and Rhythm: Normal rate and regular rhythm. Heart sounds: Normal heart sounds. No murmur heard. No friction rub. No gallop. Pulmonary:      Effort: Pulmonary effort is normal. No respiratory distress. Breath sounds: Normal breath sounds. No stridor. No wheezing or rales. Abdominal:      General: Bowel sounds are normal. There is no distension. Palpations: Abdomen is soft. Tenderness: There is no abdominal tenderness. There is no guarding or rebound. Musculoskeletal:         General: No deformity. Normal range of motion. Cervical back: Normal range of motion and neck supple. Skin:     General: Skin is warm and dry. Capillary Refill: Capillary refill takes less than 2 seconds. Findings: No rash. Neurological:      Mental Status: She is alert and oriented to person, place, and time. Psychiatric:         Behavior: Behavior normal.          EKG Interpretation:   ED Physician interpretation  Normal sinus rhythm, rate of 88, left anterior fascicular block, no ST elevations or depressions, narrow QRS.     Labs Reviewed:   Leukocytosis or anemia no leukocytosis or anemia  Mildly hypokalemic  Renal function acceptable  Lipase not consistent with pancreatitis  Troponin is negative        Imaging Reviewed: Chest x-ray negative for acute process      Course:    Perfect Serve Consult for Admission  3:42 PM    ED Room Number: SER12/12  Patient Name and age:  Enrrique Sosa 61 y.o.  female  Working Diagnosis:   1. Chest pain, unspecified type    2. Dizziness        COVID-19 Suspicion:  no  Sepsis present:  no  Reassessment needed: no  Code Status:  Full Code  Readmission: no  Isolation Requirements:  no  Recommended Level of Care:  telemetry  Department:[facility] - [phone]  Other:  61 y.o. female here with chest discomfort intermittently for a few days with associated dizziness when she gets it. Diabetic but no cardiac hx. Workup here ok. MDM: Patient is a 60-year-old female history of diabetes and hypertension presenting today with intermittent episodes of discomfort in her upper abdomen and chest with associated dizziness. EKG without acute ischemic changes and troponin negative. Chest x-ray without acute process such as pulmonary edema, pneumonia or pneumothorax. Lab work overall reassuring. Unclear etiology for her symptoms today however given that she is dizzy with the discomfort it raises some concern. I will admit her for further management and evaluation. Clinical Impression:     ICD-10-CM ICD-9-CM    1. Chest pain, unspecified type  R07.9 786.50    2.  Dizziness  R42 780.4            Disposition: Admit  Nico Ashby DO

## 2021-07-29 NOTE — ED TRIAGE NOTES
TRIAGE NOTE: Pt arrives for an odd sensation when she stands up with a \"weird feeling\" in her abdomen and chest as well as lightheadedness x 2 days. Also states she's nauseated and has some chest pressure.

## 2021-07-30 ENCOUNTER — APPOINTMENT (OUTPATIENT)
Dept: NON INVASIVE DIAGNOSTICS | Age: 61
End: 2021-07-30
Attending: SPECIALIST
Payer: COMMERCIAL

## 2021-07-30 ENCOUNTER — APPOINTMENT (OUTPATIENT)
Dept: NUCLEAR MEDICINE | Age: 61
End: 2021-07-30
Attending: SPECIALIST
Payer: COMMERCIAL

## 2021-07-30 VITALS
TEMPERATURE: 98.2 F | SYSTOLIC BLOOD PRESSURE: 147 MMHG | OXYGEN SATURATION: 97 % | HEART RATE: 72 BPM | BODY MASS INDEX: 47.77 KG/M2 | DIASTOLIC BLOOD PRESSURE: 72 MMHG | RESPIRATION RATE: 16 BRPM | HEIGHT: 61 IN | WEIGHT: 253 LBS

## 2021-07-30 PROBLEM — R94.31 ABNORMAL EKG: Status: ACTIVE | Noted: 2021-07-30

## 2021-07-30 LAB
ATRIAL RATE: 70 BPM
CALCULATED P AXIS, ECG09: -2 DEGREES
CALCULATED R AXIS, ECG10: 24 DEGREES
CALCULATED T AXIS, ECG11: 24 DEGREES
CHOLEST SERPL-MCNC: 144 MG/DL
DIAGNOSIS, 93000: NORMAL
GLUCOSE BLD STRIP.AUTO-MCNC: 132 MG/DL (ref 65–117)
GLUCOSE BLD STRIP.AUTO-MCNC: 170 MG/DL (ref 65–117)
HDLC SERPL-MCNC: 42 MG/DL
HDLC SERPL: 3.4 {RATIO} (ref 0–5)
LDLC SERPL CALC-MCNC: 65 MG/DL (ref 0–100)
P-R INTERVAL, ECG05: 178 MS
Q-T INTERVAL, ECG07: 420 MS
QRS DURATION, ECG06: 90 MS
QTC CALCULATION (BEZET), ECG08: 453 MS
SERVICE CMNT-IMP: ABNORMAL
SERVICE CMNT-IMP: ABNORMAL
STRESS BASELINE DIAS BP: 72 MMHG
STRESS BASELINE HR: 74 BPM
STRESS BASELINE SYS BP: 147 MMHG
STRESS ESTIMATED WORKLOAD: 1 METS
STRESS EXERCISE DUR MIN: NORMAL
STRESS PEAK DIAS BP: 69 MMHG
STRESS PEAK SYS BP: 168 MMHG
STRESS PERCENT HR ACHIEVED: 63 %
STRESS POST PEAK HR: 100 BPM
STRESS RATE PRESSURE PRODUCT: NORMAL BPM*MMHG
STRESS ST DEPRESSION: 0 MM
STRESS ST ELEVATION: 0 MM
STRESS STAGE 1 DURATION: 1 MIN:SEC
STRESS STAGE 1 HR: 84 BPM
STRESS STAGE 2 BP: NORMAL MMHG
STRESS STAGE 2 DURATION: 1 MIN:SEC
STRESS STAGE 2 HR: 99 BPM
STRESS STAGE 3 HR: 98 BPM
STRESS STAGE RECOVERY 1 BP: NORMAL MMHG
STRESS STAGE RECOVERY 1 DURATION: 1 MIN:SEC
STRESS STAGE RECOVERY 1 HR: 91 BPM
STRESS STAGE RECOVERY 2 DURATION: 1 MIN:SEC
STRESS STAGE RECOVERY 2 HR: 86 BPM
STRESS TARGET HR: 160 BPM
TRIGL SERPL-MCNC: 185 MG/DL (ref ?–150)
TROPONIN I SERPL-MCNC: <0.05 NG/ML
VENTRICULAR RATE, ECG03: 70 BPM
VLDLC SERPL CALC-MCNC: 37 MG/DL

## 2021-07-30 PROCEDURE — 99233 SBSQ HOSP IP/OBS HIGH 50: CPT | Performed by: CLINICAL NURSE SPECIALIST

## 2021-07-30 PROCEDURE — 74011250636 HC RX REV CODE- 250/636: Performed by: NURSE PRACTITIONER

## 2021-07-30 PROCEDURE — 74011250637 HC RX REV CODE- 250/637: Performed by: NURSE PRACTITIONER

## 2021-07-30 PROCEDURE — 99218 HC RM OBSERVATION: CPT

## 2021-07-30 PROCEDURE — 84484 ASSAY OF TROPONIN QUANT: CPT

## 2021-07-30 PROCEDURE — C9113 INJ PANTOPRAZOLE SODIUM, VIA: HCPCS | Performed by: NURSE PRACTITIONER

## 2021-07-30 PROCEDURE — 78452 HT MUSCLE IMAGE SPECT MULT: CPT

## 2021-07-30 PROCEDURE — 74011636637 HC RX REV CODE- 636/637: Performed by: NURSE PRACTITIONER

## 2021-07-30 PROCEDURE — 74011000250 HC RX REV CODE- 250: Performed by: NURSE PRACTITIONER

## 2021-07-30 PROCEDURE — A9500 TC99M SESTAMIBI: HCPCS

## 2021-07-30 PROCEDURE — 82962 GLUCOSE BLOOD TEST: CPT

## 2021-07-30 PROCEDURE — 93005 ELECTROCARDIOGRAM TRACING: CPT

## 2021-07-30 PROCEDURE — 96374 THER/PROPH/DIAG INJ IV PUSH: CPT

## 2021-07-30 PROCEDURE — 99220 PR INITIAL OBSERVATION CARE/DAY 70 MINUTES: CPT | Performed by: SPECIALIST

## 2021-07-30 PROCEDURE — 74011250636 HC RX REV CODE- 250/636: Performed by: SPECIALIST

## 2021-07-30 PROCEDURE — 80061 LIPID PANEL: CPT

## 2021-07-30 PROCEDURE — 36415 COLL VENOUS BLD VENIPUNCTURE: CPT

## 2021-07-30 RX ORDER — SODIUM CHLORIDE 0.9 % (FLUSH) 0.9 %
20 SYRINGE (ML) INJECTION
Status: COMPLETED | OUTPATIENT
Start: 2021-07-30 | End: 2021-07-30

## 2021-07-30 RX ORDER — TETRAKIS(2-METHOXYISOBUTYLISOCYANIDE)COPPER(I) TETRAFLUOROBORATE 1 MG/ML
32.5 INJECTION, POWDER, LYOPHILIZED, FOR SOLUTION INTRAVENOUS
Status: COMPLETED | OUTPATIENT
Start: 2021-07-30 | End: 2021-07-30

## 2021-07-30 RX ORDER — TETRAKIS(2-METHOXYISOBUTYLISOCYANIDE)COPPER(I) TETRAFLUOROBORATE 1 MG/ML
11 INJECTION, POWDER, LYOPHILIZED, FOR SOLUTION INTRAVENOUS
Status: COMPLETED | OUTPATIENT
Start: 2021-07-30 | End: 2021-07-30

## 2021-07-30 RX ORDER — HYDROCHLOROTHIAZIDE 12.5 MG/1
12.5 TABLET ORAL DAILY
Qty: 30 TABLET | Refills: 0 | Status: SHIPPED | OUTPATIENT
Start: 2021-07-31 | End: 2021-08-05 | Stop reason: SDUPTHER

## 2021-07-30 RX ORDER — HYDROCHLOROTHIAZIDE 25 MG/1
12.5 TABLET ORAL DAILY
Status: DISCONTINUED | OUTPATIENT
Start: 2021-07-31 | End: 2021-07-30 | Stop reason: HOSPADM

## 2021-07-30 RX ORDER — TRIAMTERENE/HYDROCHLOROTHIAZID 37.5-25 MG
2 TABLET ORAL DAILY
Status: DISCONTINUED | OUTPATIENT
Start: 2021-07-30 | End: 2021-07-30

## 2021-07-30 RX ORDER — PRAVASTATIN SODIUM 20 MG/1
20 TABLET ORAL
Status: DISCONTINUED | OUTPATIENT
Start: 2021-07-30 | End: 2021-07-30 | Stop reason: HOSPADM

## 2021-07-30 RX ORDER — METFORMIN HYDROCHLORIDE 500 MG/1
500 TABLET ORAL 2 TIMES DAILY WITH MEALS
Status: DISCONTINUED | OUTPATIENT
Start: 2021-07-30 | End: 2021-07-30 | Stop reason: HOSPADM

## 2021-07-30 RX ORDER — LOSARTAN POTASSIUM 50 MG/1
50 TABLET ORAL DAILY
Status: DISCONTINUED | OUTPATIENT
Start: 2021-07-31 | End: 2021-07-30 | Stop reason: HOSPADM

## 2021-07-30 RX ORDER — TRIAMTERENE/HYDROCHLOROTHIAZID 37.5-25 MG
1 TABLET ORAL DAILY
Status: DISCONTINUED | OUTPATIENT
Start: 2021-07-31 | End: 2021-07-30

## 2021-07-30 RX ORDER — ALPRAZOLAM 0.5 MG/1
0.25 TABLET ORAL
Status: DISCONTINUED | OUTPATIENT
Start: 2021-07-30 | End: 2021-07-30 | Stop reason: HOSPADM

## 2021-07-30 RX ORDER — PANTOPRAZOLE SODIUM 40 MG/1
40 TABLET, DELAYED RELEASE ORAL DAILY
Qty: 30 TABLET | Refills: 0 | Status: SHIPPED | OUTPATIENT
Start: 2021-07-30 | End: 2021-10-14 | Stop reason: ALTCHOICE

## 2021-07-30 RX ORDER — LOSARTAN POTASSIUM 50 MG/1
50 TABLET ORAL DAILY
Qty: 30 TABLET | Refills: 0 | Status: SHIPPED | OUTPATIENT
Start: 2021-07-31 | End: 2021-08-05 | Stop reason: SDUPTHER

## 2021-07-30 RX ADMIN — TRIAMTERENE AND HYDROCHLOROTHIAZIDE 2 TABLET: 37.5; 25 TABLET ORAL at 09:43

## 2021-07-30 RX ADMIN — REGADENOSON 0.4 MG: 0.08 INJECTION, SOLUTION INTRAVENOUS at 13:02

## 2021-07-30 RX ADMIN — Medication 20 ML: at 13:02

## 2021-07-30 RX ADMIN — TETRAKIS(2-METHOXYISOBUTYLISOCYANIDE)COPPER(I) TETRAFLUOROBORATE 11 MILLICURIE: 1 INJECTION, POWDER, LYOPHILIZED, FOR SOLUTION INTRAVENOUS at 11:27

## 2021-07-30 RX ADMIN — INSULIN LISPRO 2 UNITS: 100 INJECTION, SOLUTION INTRAVENOUS; SUBCUTANEOUS at 07:28

## 2021-07-30 RX ADMIN — Medication 10 ML: at 05:53

## 2021-07-30 RX ADMIN — PRAVASTATIN SODIUM 20 MG: 20 TABLET ORAL at 01:27

## 2021-07-30 RX ADMIN — Medication 10 ML: at 01:27

## 2021-07-30 RX ADMIN — TETRAKIS(2-METHOXYISOBUTYLISOCYANIDE)COPPER(I) TETRAFLUOROBORATE 32.5 MILLICURIE: 1 INJECTION, POWDER, LYOPHILIZED, FOR SOLUTION INTRAVENOUS at 13:20

## 2021-07-30 RX ADMIN — SODIUM CHLORIDE 40 MG: 9 INJECTION INTRAMUSCULAR; INTRAVENOUS; SUBCUTANEOUS at 01:27

## 2021-07-30 RX ADMIN — ACETAMINOPHEN 650 MG: 325 TABLET ORAL at 08:29

## 2021-07-30 NOTE — H&P
History and Physical    Subjective:     Chest/abdomen discomfort, dizziness    Neena Rogers is a 61 y.o. female with a PMH significant for obesity, T2DM, GERD, hepatic steatosis, HTN, migraines, sleep apnea and von Willebrand's disease transferred from the ED at [facility] for evaluation of intermittent upper abdominal pain that radiated into her chest.  Patient reported that approximately 4 days ago, she had several episodes of \"a weird, nervous and anxious feeling\" in her stomach that radiated up to her chest that was accompanied by a \"mental fogginess\". She states this occurred several times, always when she moves from a sitting to a standing position. As the week progressed, the episodes became more frequent and more intense, but were always associated with sitting/standing. Many episodes were accompanied by nausea, but she never vomited. Patient also explained that she never felt dizzy or lightheaded and never felt \"things spinning\". She denies having had palpitations, cough or shortness of breath, but noted that several times she felt \"more winded\" after doing simple things like tying her shoes. CXR was negative for acute process, EKG showed sinus rhythm 88 bpm with a new left anterior fascicular block. First 2 troponins are negative. Assessment:     Principal Problem:    Chest pain (7/29/2021)        Plan:     Atypical chest pain  · Intermittent abdomen/chest discomfort x4 days accompanied by \"mental fogginess\"  · Troponins normal, no acute changes on EKG  · Admit to remote telemetry  · Cardiology consult.  Pt states she was told by ED staff she would have a stress test.  · Will hold off on beta-blocker, ACE/ARB and antiplatelet until eval by cards  · NPO, hold oral antihyperglycemics in case imaging/stress testing is planned   · Continue home statin  · Add PPI (reports hx GERD, but not on meds)  · Check lipids with next blood draw  · Check orthostatics  · Defer decision on additional consults to attending    T2DM with hyperglycemia  · Hold oral antihyperglycemics as above  · ACHS with sliding scale coverage  · Last A1c May 2021 was 9.  · Diabetic diet, consult diabetes education    Obesity  · Diabetic diet when allowed to eat  · Counseling    Hypertension  · Continue home meds for now, monitor    History of sleep apnea  · Does not use BiPAP    Diet: NPO for now then consistent carb when able to eat  Activity: OOB ad arielle. DVT: Lovenox  Code: Full  Functional Status: Ambulates Independently  Patient is from: Home   Discharge plan:         Past Medical History:   Diagnosis Date    Arrhythmia     heart murmur    Chronic pain     Contact dermatitis and other eczema, due to unspecified cause     Diabetes (HCC)     GERD (gastroesophageal reflux disease)     Hepatic steatosis     Hypertension     Joint pain     & muscle aches    Migraine     Nephrolithiasis     Obesity     Sleep apnea     z[place]ata not use c-pap    Von Willebrand disease ([facility] [address]        Past Surgical History:   Procedure Laterality Date    HX CARPAL TUNNEL RELEASE      HX HYSTERECTOMY  2002?  HX ORTHOPAEDIC  2008, 2009    carpal tunnel    HX ORTHOPAEDIC  2010    torn meniscus    HX THYROIDECTOMY  01/2013    right side    ME BREAST SURGERY PROCEDURE UNLISTED  1984    breast reduction    ME CHEST SURGERY PROCEDURE UNLISTED  1/29/13     THYROIDECTOMY Retrosternal and Retromandibular!     ME MOHS SURG, ADDL BLOCK      basal cell on nose       Family History   Problem Relation Age of Onset    Hypertension Father     Stroke Father        Social History     Tobacco Use   Smoking Status Never Smoker   Smokeless Tobacco Never Used       Social History     Substance and Sexual Activity   Alcohol Use Yes    Alcohol/week: 1.7 standard drinks    Types: 1 Glasses of wine, 1 Cans of beer per week    Comment: rarely, 3x a month       Social History     Substance and Sexual Activity   Drug Use Not on file         Prior to Admission Medications   Prescriptions Last Dose Informant Patient Reported? Taking? ALPRAZolam (XANAX) 0.25 mg tablet 6/29/2021 at Unknown time  No Yes   Sig: TAKE 1 TABLET BY MOUTH TWICE DAILY AS NEEDED FOR ANXIETY   Blood-Glucose Meter monitoring kit Not Taking at Unknown time  No No   Sig: Use as directed   Patient not taking: Reported on 7/29/2021   Blood-Glucose Meter monitoring kit Not Taking at Unknown time  No No   Sig: Check fsbs bid 250.02   Patient not taking: Reported on 7/29/2021   OTEZLA 30 mg tab 7/28/2021 at Unknown time  Yes Yes   cyclobenzaprine (FLEXERIL) 10 mg tablet Not Taking at Unknown time  No No   Sig: Take 1 Tab by mouth three (3) times daily as needed for Muscle Spasm(s). Patient not taking: Reported on 7/29/2021   glimepiride (AMARYL) 4 mg tablet Not Taking at Unknown time  No No   Sig: Take 1 Tablet by mouth every morning. Patient not taking: Reported on 7/29/2021   glucose blood VI test strips (ASCENSIA AUTODISC VI, ONE TOUCH ULTRA TEST VI) strip Not Taking at Unknown time  No No   Sig: Check fsbs bid 250.02   Patient not taking: Reported on 7/29/2021   glucose blood VI test strips (FREESTYLE TEST) strip Not Taking at Unknown time  No No   Sig: Freestyle freedom lite test trips  -check fsbs every day  250.00   Patient not taking: Reported on 7/29/2021   lancets misc   No No   Sig: Check fsbs bid 250.02   metFORMIN (GLUCOPHAGE) 500 mg tablet 7/28/2021 at Unknown time  No Yes   Sig: Take 1 Tablet by mouth two (2) times daily (with meals). 2 tabs qd   pravastatin (PRAVACHOL) 20 mg tablet 7/28/2021 at Unknown time  No Yes   Sig: Take 1 Tablet by mouth nightly. triamterene-hydroCHLOROthiazide (MAXZIDE) 37.5-25 mg per tablet 7/28/2021 at Unknown time  No Yes   Sig: Take 2 Tabs by mouth daily.       Facility-Administered Medications: None       Allergies   Allergen Reactions    Pcn [Penicillins] Unknown (comments)     Given as a child and can't remember reaction       Review of Systems:    Review of Systems   Constitutional: Negative for chills, fever, malaise/fatigue and weight loss. HENT: Negative for congestion, nosebleeds, sinus pain and sore throat. Eyes: Negative for blurred vision, double vision, photophobia, pain and discharge. Respiratory: Negative for cough, hemoptysis, sputum production, shortness of breath, wheezing and stridor. Cardiovascular: Positive for chest pain. Negative for palpitations, orthopnea, claudication, leg swelling and PND. Gastrointestinal: Positive for abdominal pain, nausea and vomiting. Negative for blood in stool, constipation, diarrhea and heartburn. Genitourinary: Negative for dysuria, frequency, hematuria and urgency. Musculoskeletal: Negative for back pain, joint pain, myalgias and neck pain. Skin: Negative. Neurological: Positive for sensory change. Negative for dizziness, tingling, tremors, speech change, focal weakness, seizures, loss of consciousness, weakness and headaches. Endo/Heme/Allergies: Negative. Psychiatric/Behavioral: Negative for hallucinations, memory loss and substance abuse. The patient is not nervous/anxious and does not have insomnia. No LMP recorded. Patient has had a hysterectomy. Objective:     Physical Exam:     Constitutional:  No acute distress, cooperative, pleasant   Psych:  Good insight, Not anxious nor agitated. Neurologic:  LUIGI, EOMI. AAOx3, CN II-XII reviewed  Resp:  CTA bilaterally. Resps easy and unlabored. No wheezing/RUBS/rhonchi/crackles. No accessory muscle use. Able to speak in full sentences. HEENT:  Oral mucosa moist, oropharynx benign. CV:  Regular rhythm, normal rate, no murmurs, gallops, rubs  GI:  Soft, non distended, non tender. normoactive bowel sounds, no hepatosplenomegaly. Obese  : Continent, voiding. Urine clear  Musculoskeletal: MCGUIRE, ambulatory.   Peripheral Vascular: Trace bipedal edema edema, warm, 2+ pulses throughout       Patient Vitals for the past 12 hrs:   Temp Pulse Resp BP SpO2   07/29/21 2200 98.3 °F (36.8 °C) 77 16 124/66 96 %   07/29/21 1530 98.3 °F (36.8 °C) 76 14 129/79 96 %   07/29/21 1334  (!) 101 16 138/85 96 %   07/29/21 1331 98.8 °F (37.1 °C) 87  (!) 148/71    07/29/21 1329  99 21 (!) 154/106 97 %           Data Review:    Recent Results (from the past 12 hour(s))   SAMPLES BEING HELD    Collection Time: 07/29/21  1:31 PM   Result Value Ref Range    SAMPLES BEING HELD 1red, 1lav, 1pst     COMMENT        Add-on orders for these samples will be processed based on acceptable specimen integrity and analyte stability, which may vary by analyte. CBC WITH AUTOMATED DIFF    Collection Time: 07/29/21  1:31 PM   Result Value Ref Range    WBC 9.0 3.6 - 11.0 K/uL    RBC 5.93 (H) 3.80 - 5.20 M/uL    HGB 14.6 11.5 - 16.0 g/dL    HCT 46.8 35.0 - 47.0 %    MCV 78.9 (L) 80.0 - 99.0 FL    MCH 24.6 (L) 26.0 - 34.0 PG    MCHC 31.2 30.0 - 36.5 g/dL    RDW 13.5 11.5 - 14.5 %    PLATELET 448 489 - 188 K/uL    MPV 9.8 8.9 - 12.9 FL    NRBC 0.0 0  WBC    ABSOLUTE NRBC 0.00 0.00 - 0.01 K/uL    NEUTROPHILS 68 32 - 75 %    LYMPHOCYTES 23 12 - 49 %    MONOCYTES 7 5 - 13 %    EOSINOPHILS 2 0 - 7 %    BASOPHILS 0 0 - 1 %    IMMATURE GRANULOCYTES 0 0.0 - 0.5 %    ABS. NEUTROPHILS 6.1 1.8 - 8.0 K/UL    ABS. LYMPHOCYTES 2.1 0.8 - 3.5 K/UL    ABS. MONOCYTES 0.6 0.0 - 1.0 K/UL    ABS. EOSINOPHILS 0.2 0.0 - 0.4 K/UL    ABS. BASOPHILS 0.0 0.0 - 0.1 K/UL    ABS. IMM.  GRANS. 0.0 0.00 - 0.04 K/UL    DF AUTOMATED     METABOLIC PANEL, COMPREHENSIVE    Collection Time: 07/29/21  1:31 PM   Result Value Ref Range    Sodium 138 136 - 145 mmol/L    Potassium 3.4 (L) 3.5 - 5.1 mmol/L    Chloride 99 97 - 108 mmol/L    CO2 30 21 - 32 mmol/L    Anion gap 9 5 - 15 mmol/L    Glucose 152 (H) 65 - 100 mg/dL    BUN 12 6 - 20 MG/DL    Creatinine 0.90 0.55 - 1.02 MG/DL    BUN/Creatinine ratio 13 12 - 20      GFR est AA >60 >60 ml/min/1.73m2    GFR est non-AA >60 >60 ml/min/1.73m2    Calcium 9.1 8.5 - 10.1 MG/DL    Bilirubin, total 0.6 0.2 - 1.0 MG/DL    ALT (SGPT) 39 12 - 78 U/L    AST (SGOT) 39 (H) 15 - 37 U/L    Alk. phosphatase 88 45 - 117 U/L    Protein, total 7.8 6.4 - 8.2 g/dL    Albumin 3.6 3.5 - 5.0 g/dL    Globulin 4.2 (H) 2.0 - 4.0 g/dL    A-G Ratio 0.9 (L) 1.1 - 2.2     LIPASE    Collection Time: 07/29/21  1:31 PM   Result Value Ref Range    Lipase 122 73 - 393 U/L   TROPONIN I    Collection Time: 07/29/21  1:31 PM   Result Value Ref Range    Troponin-I, Qt. <0.05 <0.05 ng/mL   EKG, 12 LEAD, INITIAL    Collection Time: 07/29/21  1:31 PM   Result Value Ref Range    Ventricular Rate 88 BPM    Atrial Rate 88 BPM    P-R Interval 176 ms    QRS Duration 82 ms    Q-T Interval 380 ms    QTC Calculation (Bezet) 459 ms    Calculated P Axis 38 degrees    Calculated R Axis -82 degrees    Calculated T Axis 31 degrees    Diagnosis       Normal sinus rhythm  Left anterior fascicular block  Nonspecific T wave abnormality  Poor R-wave Progression (consider lead placement or loss of anterior forces)  Abnormal ECG  When compared with ECG of 29-JAN-2013 12:04,  Left anterior fascicular block is now present  Left posterior fascicular block is no longer present  Confirmed by Jordy Ware M.D., Kami Amezquita ([id number]) on 7/29/2021 2:50:08 PM     TROPONIN I    Collection Time: 07/29/21  7:03 PM   Result Value Ref Range    Troponin-I, Qt. <0.05 <0.05 ng/mL       XR CHEST PA LAT    Result Date: 7/29/2021   impression: Negative. EKG: Normal sinus rhythm 88 bpm, new left anterior fascicular block    _________________________________________________________________________________________________________________________________________________________    Ervin Garza, AVRIL  07/30/21      This document was created using voice dictation software. Any misspellings and/or medical errors should be excused.   If clarification on any part of this document is needed, please do not hesitate to contact the provider directly

## 2021-07-30 NOTE — PROGRESS NOTES
Bedside and verbal shift change report given to Reshma Dejesus RN (oncoming nurse) by Juarez Bautista (offgoing nurse). Report included the following information SBAR, Kardex and MAR.

## 2021-07-30 NOTE — DISCHARGE SUMMARY
Discharge Summary       PATIENT ID: Delmis Golden  MRN: [id number]   YOB: 1960    DATE OF ADMISSION: 7/29/2021  1:19 PM    DATE OF DISCHARGE: 07/30/21   PRIMARY CARE PROVIDER: Aleks Delgado MD     ATTENDING PHYSICIAN: Svetlana Boston MD  DISCHARGING PROVIDER: Svetlana Boston MD    To contact this individual call [phone] and ask the  to page. If unavailable ask to be transferred the Adult Hospitalist Department. CONSULTATIONS: IP CONSULT TO CARDIOLOGY    PROCEDURES/SURGERIES: * No surgery found *    [address] COURSE:   Atypical chest pain  T2DM with hyperglycemia  Obesity  Hypertension  History of sleep apnea    CC/ HPI:  Chest/abdomen discomfort, dizziness     Delmis Golden is a 61 y.o. female with a PMH significant for obesity, T2DM, GERD, hepatic steatosis, HTN, migraines, sleep apnea and von Willebrand's disease transferred from the ED at [facility] today for evaluation of intermittent upper abdominal pain that radiated into her chest.  Patient reported that approximately 4 days ago, she had several episodes of \"a weird, nervous and anxious feeling\" in her stomach that radiated up to her chest that was accompanied by a \"mental fogginess\". She states this occurred several times, always when she moves from a sitting to a standing position. As the week progressed, the episodes became more frequent and more intense, but were always associated with sitting/standing. Many episodes were accompanied by nausea, but she never vomited. Patient also explained that she never felt dizzy or lightheaded and never felt \"things spinning\". She denies having had palpitations, cough or shortness of breath, but noted that several times she felt \"more winded\" after doing simple things like tying her shoes.     CXR was negative for acute process, EKG showed sinus rhythm 88 bpm with a new left anterior fascicular block.   First 2 troponins are negative.     Subjective: Follow up chest discomfort, dizziness  Patient seen and examined at the bedside. Labs, images and notes reviewed  Discussed with nursing staff, orders reviewed. Plan discussed with patient/Family  Patient is feeling well. Feels like she is ready to go home. Seen before stress test.  Subsequently post stress test cardiology recommendation noted for discharge. Patient wants to go home. No chest pain, shortness of breath, palpitation, dizziness or lightheadedness. Cardiology recommendation noted and Maxide discontinued with transition to losartan and HCTZ. Counseled patient for follow-up with cardiology as well as PCP with close monitoring of CBC, CMP and BP monitoring and management. DISCHARGE DIAGNOSES / PLAN:      Atypical chest pain  · Intermittent abdomen/chest discomfort x4 days accompanied by \"mental fogginess\"  · Troponins normal, no acute changes on EKG  · Admitted to remote telemetry  · Cardiology consult. Appreciate recommendations  · Lexiscan Myoview stress test on 7/30/2021. Noted. · No further cardiac work-up as per cardiology based on the stress test results. · Continue PPI on discharge. · Discontinue Maxzide. Initiate losartan 50 mg daily along with HCTZ 12.5 mg daily as per cardiology recommendations. · Recommend close PCP follow-up within 37 days with CBC, CMP along with blood pressure monitoring and post hospital discharge follow-up  · Likely related to orthostasis per cardiology. · Continue other medications as before  · Okay to DC from cardiology standpoint.     T2DM with hyperglycemia  · Hold oral antihyperglycemics  on admission.   Will resume back on discharge  · Last A1c May 2021 was 9.  · Diabetic diet     Obesity  · Lifestyle modifications reiterated.     Hypertension  · Discontinue Maxzide as per cardiology  · Losartan 50 mg daily and HCTZ 12.5 mg daily initiated per cardiology recommendations  · Further close follow-up and monitoring with PCP/cardiology as noted     History of sleep apnea  · Does not use BiPAP    DC home today. Okay per cardiology.        ADDITIONAL CARE RECOMMENDATIONS:   Follow up with PCP with CBC, CMP and BP monitoring  · It is important that you take the medication exactly as they are prescribed. · Keep your medication in the bottles provided by the pharmacist and keep a list of the medication names, dosages, and times to be taken in your wallet. · Do not take other medications without consulting your doctor. · No drinking alcohol or driving car or operating machinery if you are on narcotic pain medications. Donot take sedating mediations if you are sleepy or confused. · Fall Precautions  · Keep Well Hydrated  · Report to your medical provider if you feel you have  developed allergies to medications  · Follow up with your PCP or Consultant for medication adjustments and refills  · Monitor for signs of fevers,chills,bleeding,chest pain and seek medical attention if you do so.          DIET: Cardiac Diet and Diabetic Diet     ACTIVITY: Activity as tolerated     WOUND CARE: NA     EQUIPMENT needed: NA     PENDING TEST RESULTS:   At the time of discharge the following test results are still pending: None    FOLLOW UP APPOINTMENTS:    Follow-up Information     Follow up With Specialties Details Why Contact Info    Keyana Khalil MD Internal Medicine In 5 days PCP: Post hospital discharge follow up within 3-7 days with CBC, CMP, and BP monitoring [address] [phone]      Mckayla Sewell MD Cardiology In 2 weeks Cardiology: follow up with your primary cardiology office in 2-4 weeks or as needed before or after that [facility]  [phone]               DISCHARGE MEDICATIONS:  Current Discharge Medication List      START taking these medications    Details   losartan (COZAAR) 50 mg tablet Take 1 Tablet by mouth daily.   Qty: 30 Tablet, Refills: 0  Start date: 7/31/2021      hydroCHLOROthiazide (HYDRODIURIL) 12.5 mg tablet Take 1 Tablet by mouth daily. Qty: 30 Tablet, Refills: 0  Start date: 7/31/2021      pantoprazole (PROTONIX) 40 mg tablet Take 1 Tablet by mouth daily. Qty: 30 Tablet, Refills: 0  Start date: 7/30/2021         CONTINUE these medications which have NOT CHANGED    Details   metFORMIN (GLUCOPHAGE) 500 mg tablet Take 1 Tablet by mouth two (2) times daily (with meals). 2 tabs qd  Qty: 120 Tablet, Refills: 3      pravastatin (PRAVACHOL) 20 mg tablet Take 1 Tablet by mouth nightly. Qty: 90 Tablet, Refills: 3      ALPRAZolam (XANAX) 0.25 mg tablet TAKE 1 TABLET BY MOUTH TWICE DAILY AS NEEDED FOR ANXIETY  Qty: 30 Tab, Refills: 5    Associated Diagnoses: Primary insomnia      OTEZLA 30 mg tab       glimepiride (AMARYL) 4 mg tablet Take 1 Tablet by mouth every morning. Qty: 90 Tablet, Refills: 1      cyclobenzaprine (FLEXERIL) 10 mg tablet Take 1 Tab by mouth three (3) times daily as needed for Muscle Spasm(s). Qty: 12 Tab, Refills: 0      !! Blood-Glucose Meter monitoring kit Check fsbs bid 250.02  Qty: 1 Kit, Refills: 0      !! glucose blood VI test strips (ASCENSIA AUTODISC VI, ONE TOUCH ULTRA TEST VI) strip Check fsbs bid 250.02  Qty: 100 Strip, Refills: 5      lancets misc Check fsbs bid 250.02  Qty: 1 Each, Refills: 11      !! glucose blood VI test strips (FREESTYLE TEST) strip Freestyle freedom lite test trips  -check fsbs every day  250.00  Qty: 100 Strip, Refills: 11      !! Blood-Glucose Meter monitoring kit Use as directed  Qty: 1 Kit, Refills: 0    Associated Diagnoses: Type II diabetes mellitus, uncontrolled ([address]       ! ! - Potential duplicate medications found. Please discuss with provider.       STOP taking these medications       triamterene-hydroCHLOROthiazide (MAXZIDE) 37.5-25 mg per tablet Comments:   Reason for Stopping:                 NOTIFY YOUR PHYSICIAN FOR ANY OF THE FOLLOWING:   Fever over 101 degrees for 24 hours.   Chest pain, shortness of breath, fever, chills, nausea, vomiting, diarrhea, change in mentation, falling, weakness, bleeding. Severe pain or pain not relieved by medications. Or, any other signs or symptoms that you may have questions about. DISPOSITION:  x  Home With:   OT  PT  HH  RN       Long term SNF/Inpatient Rehab    Independent/assisted living    Hospice    Other:       PATIENT CONDITION AT DISCHARGE:     Functional status    Poor     Deconditioned    x Independent      Cognition    x Lucid     Forgetful     Dementia      Catheters/lines (plus indication)    Newsome     PICC     PEG    x None      Code status    x Full code     DNR      PHYSICAL EXAMINATION AT DISCHARGE:  Visit Vitals  BP (!) 147/72   Pulse 72   Temp 98.2 °F (36.8 °C)   Resp 16   Ht 5' 1\" (1.549 m)   Wt 114.8 kg (253 lb)   SpO2 97%   BMI 47.80 kg/m²       General:          Alert, cooperative, no distress, appears stated age. HEENT:           Atraumatic, anicteric sclerae, pink conjunctivae                          No oral ulcers, mucosa moist, throat clear, dentition fair  Neck:               Supple, symmetrical  Lungs:             Clear to auscultation bilaterally. No Wheezing or Rhonchi. No rales. Chest wall:      No tenderness  No Accessory muscle use. Heart:              Regular  rhythm,  No  murmur   No edema  Abdomen:        Soft, non-tender. Not distended. Bowel sounds normal  Extremities:     No cyanosis. No clubbing,                            Skin turgor normal, Capillary refill normal  Skin:                Not pale. Not Jaundiced  No rashes   Psych:             Not anxious or agitated.   Neurologic:      Alert, moves all extremities, answers questions appropriately and responds to commands       CHRONIC MEDICAL DIAGNOSES:  Problem List as of 7/30/2021 Date Reviewed: 7/30/2021        Codes Class Noted - Resolved    Abnormal EKG ICD-10-CM: R94.31  ICD-9-CM: 794.31  7/30/2021 - Present        * (Principal) Chest pain ICD-10-CM: R07.9  ICD-9-CM: 786.50  7/29/2021 - Present        Obesity, morbid ([facility] ICD-10-CM: E66.01  ICD-9-CM: 278.01  11/9/2018 - Present        Type 2 diabetes mellitus without complication ([facility] IOT-33-AC: E11.9  ICD-9-CM: 250.00  5/9/2016 - Present        Hepatic steatosis ICD-10-CM: K76.0  ICD-9-CM: 571.8  4/22/2015 - Present        HLD (hyperlipidemia) ICD-10-CM: E78.5  ICD-9-CM: 272.4  6/28/2014 - Present        Goiter ICD-10-CM: E04.9  ICD-9-CM: 240.9  6/27/2013 - Present    Overview Signed 6/27/2013  2:19 PM by Satinder Galo MD     [email or web address]             Diabetes mellitus ([facility] ICD-10-CM: E11.9  ICD-9-CM: 250.00  11/3/2011 - Present        Type 1 von Willebrand disease ([facility] ICD-10-CM: D68.0  ICD-9-CM: 286.4  2/12/2010 - Present        Essential hypertension, benign ICD-10-CM: I10  ICD-9-CM: 401.1  2/12/2010 - Present        Obesity ICD-10-CM: E66.9  ICD-9-CM: 278.00  2/12/2010 - Present        PEGGY (obstructive sleep apnea) ICD-10-CM: G47.33  ICD-9-CM: 327.23  2/12/2010 - Present    Overview Signed 2/12/2010 12:29 PM by Satinder Galo MD     cpap             GERD (gastroesophageal reflux disease) ICD-10-CM: K21.9  ICD-9-CM: 530.81  2/12/2010 - Present        Venous insufficiency ICD-10-CM: I87.2  ICD-9-CM: 459.81  2/12/2010 - Present        Nephrolithiasis ICD-10-CM: N20.0  ICD-9-CM: 592.0  2/12/2010 - Present        Allergic rhinitis ICD-10-CM: J30.9  ICD-9-CM: 477.9  2/12/2010 - Present        Asthma ICD-10-CM: J45.909  ICD-9-CM: 493.90  2/12/2010 - Present        Carpal tunnel syndrome ICD-10-CM: G56.00  ICD-9-CM: 354.0  2/12/2010 - Present        Basal cell carcinoma of nose ICD-10-CM: U34.571  ICD-9-CM: 173.31  2/12/2010 - Present        Migraine ICD-10-CM: U81.532  ICD-9-CM: 346.90  2/12/2010 - Present    Overview Signed 2/12/2010 12:31 PM by Satinder Galo MD     ocular                 Radiology:  XR CHEST PA LAT     Result Date: 7/29/2021   impression: Negative.     Labs:         Recent Results (from the past 24 hour(s))   TROPONIN I     Collection Time: 07/29/21  7:03 PM   Result Value Ref Range     Troponin-I, Qt. <0.05 <0.05 ng/mL   TROPONIN I     Collection Time: 07/30/21  5:57 AM   Result Value Ref Range     Troponin-I, Qt. <0.05 <0.05 ng/mL   LIPID PANEL     Collection Time: 07/30/21  5:57 AM   Result Value Ref Range     Cholesterol, total 144 <200 MG/DL     Triglyceride 185 (H) <150 MG/DL     HDL Cholesterol 42 MG/DL     LDL, calculated 65 0 - 100 MG/DL     VLDL, calculated 37 MG/DL     CHOL/HDL Ratio 3.4 0.0 - 5.0     GLUCOSE, POC     Collection Time: 07/30/21  7:20 AM   Result Value Ref Range     Glucose (POC) 170 (H) 65 - 117 mg/dL     Performed by [facility] Yocasta CHEN     EKG, 12 LEAD, INITIAL     Collection Time: 07/30/21  7:53 AM   Result Value Ref Range     Ventricular Rate 70 BPM     Atrial Rate 70 BPM     P-R Interval 178 ms     QRS Duration 90 ms     Q-T Interval 420 ms     QTC Calculation (Bezet) 453 ms     Calculated P Axis -2 degrees     Calculated R Axis 24 degrees     Calculated T Axis 24 degrees     Diagnosis           Normal sinus rhythm  Nonspecific T wave abnormality  Inferior Q waves of questionable significance  Abnormal ECG  When compared with ECG of 29-JUL-2021 13:31,  No significant change was found  Confirmed by oLan Oconnor M.D., Alex Cunningham ([id number]) on 7/30/2021 1:08:22 PM      GLUCOSE, POC     Collection Time: 07/30/21 11:38 AM   Result Value Ref Range     Glucose (POC) 132 (H) 65 - 117 mg/dL     Performed by Shena leal RN     NUCLEAR CARDIAC STRESS TEST     Collection Time: 07/30/21  3:08 PM   Result Value Ref Range     Target  bpm     Exercise duration time 00:03:00       Stress Base Systolic  mmHg     Stress Base Diastolic BP 69 mmHg     Post peak  BPM     Baseline HR 74 BPM     Estimated workload 1.0 METS     Baseline  mmHg     Percent HR 63 %     ST Elevation (mm) 0 mm     ST Depression (mm) 0 mm     Stress Base Diastolic BP 72 mmHg     Stress Rate Pressure Product 16,800 BPM*mmHg     Stress Stage 1 Duration 1 min:sec     Stress Stage 1 HR 84 bpm     Stress Stage 2 Duration 1 min:sec     Stress Stage 2 HR 99 bpm     Stress Stage 2 /77 mmHg     Stress Stage 3 HR 98 bpm     Recovery Stage 1 Duration 1 min:sec     Recovery Stage 1 HR 91 bpm     Recovery Stage 1 /69 mmHg     Recovery Stage 2 Duration 1 min:sec     Recovery Stage 2 HR 86 bpm      NM Stress test 7/30/21:  Impression:   Normal gated rest/stress myocardial perfusion imaging study. No evidence of myocardial ischemia or infarction.    Left ventricular ejection fraction is 65 %.       Greater than 40 minutes were spent with the patient on counseling and coordination of care    Signed:   Nghia Monzon MD  7/30/2021  3:57 PM

## 2021-07-30 NOTE — PROGRESS NOTES
Chart reviewed. Patient seen by my partner Dr. Javier Lozada for chest pain. Now status post Lexiscan Myoview stress test.    Results as below. 07/29/21    NUCLEAR CARDIAC STRESS TEST 07/30/2021, 07/30/2021 7/30/2021    Interpretation Summary  · Baseline ECG: Sinus rhythm. · Stress test: Negative stress test.    INDICATION: Chest pain. History of hypertension, diabetes    COMPARISON:  None. CORRELATIVE IMAGING STUDIES:  None    TRACER: Tc 99m Sestamibi    TECHNIQUE:  Resting SPECT images of the heart were obtained following the uneventful intravenous administration of 11.0 mCi of Tc 99m Sestamibi. Gated stress SPECT images of the heart were obtained following Lexiscan protocol and the uneventful intravenous administration of 32.5 mCi of Tc 99m Sestamibi. FINDINGS:  The rest and stress perfusion images demonstrate no significant perfusion defect or evidence of myocardial reversibility. The gated images demonstrate normal global and regional wall motion and thickening. Left ventricular ejection fraction is 65 %. Impression:  Normal gated rest/stress myocardial perfusion imaging study. No evidence of myocardial ischemia or infarction. Left ventricular ejection fraction is 65 %. Signed by: Ayla Leigh MD on 7/30/2021  2:51 PM, Signed by: Addison Chavez MD on 7/30/2021  3:02 PM          Normal nuclear stress test.  Normal ejection fraction. No additional cardiac interventions at this point. We will sign off and remain available as needed.

## 2021-07-30 NOTE — PROGRESS NOTES
Attempted to schedule hospital follow up PCP appointment. Office nurse will contact the patient with appointment information.   Jayde Hunt, Care Management Specialist.

## 2021-07-30 NOTE — CONSULTS
Consult Note      Assessment:    Patient Active Problem List   Diagnosis Code    Type 1 von Willebrand disease ([facility] [address] D68.0    Essential hypertension, benign I10    Obesity E66.9    PEGGY (obstructive sleep apnea) G47.33    GERD (gastroesophageal reflux disease) K21.9    Venous insufficiency I87.2    Nephrolithiasis N20.0    Allergic rhinitis J30.9    Asthma J45.909    Carpal tunnel syndrome G56.00    Basal cell carcinoma of nose C44.311    Migraine G43.909    Diabetes mellitus (HCC) E11.9    Goiter E04.9    HLD (hyperlipidemia) E78.5    Hepatic steatosis K76.0    Type 2 diabetes mellitus without complication (HCC) H11.6    Obesity, morbid (HCC) E66.01    Chest pain R07.9    Abnormal EKG R94.31       Recommendations:    Stress test today. Suspect her symptoms are not due to CAD, sounds orthostatic. She is on high dose Maxzide for BP as monotherapy, K is low. Will reduce dose. No appetite, has not eaten for 3 days. With diabetes she would be better served by taking ARB, possibly with low dose HCTZ. I don't think EKG should be read as prior MI. Jeremi Fernandez MD  [phone]  Liam Mckeon is a 61 y.o. female who presented 7/29/2021 with complaints of chest pain, dizziness and abdominal discomfort. The symptoms began approximately 4 days ago. All 3 spells over 4 days have occurred when going from sitting to standing position, associated with dizziness as well. She has no history of cardiac disease including CAD, MI, CHF, atrial fib and arrhythmias/ectopy. Examination by the attending physician suggested the possibility of CAD. At present the patient has significantly improved since initial presentation. Therapy thus far has included pain medications. She has diabetes, does not exercise. Cardiology has been consulted to assist in the management of this patient.     Current Facility-Administered Medications   Medication Dose Route Frequency    pantoprazole (PROTONIX) 40 mg in 0.9% sodium chloride 10 mL injection  40 mg IntraVENous DAILY    ALPRAZolam (XANAX) tablet 0.25 mg  0.25 mg Oral QHS PRN    [Held by provider] metFORMIN (GLUCOPHAGE) tablet 500 mg  500 mg Oral BID WITH MEALS    [Held by provider] . PHARMACY TO SUBSTITUTE PER PROTOCOL (Reordered from: OTEZLA 30 mg tab)    Per Protocol    pravastatin (PRAVACHOL) tablet 20 mg  20 mg Oral QHS    [START ON 7/31/2021] triamterene-hydroCHLOROthiazide (MAXZIDE) 37.5-25 mg per tablet 1 Tablet  1 Tablet Oral DAILY    sodium chloride (NS) flush 5-40 mL  5-40 mL IntraVENous Q8H    sodium chloride (NS) flush 5-40 mL  5-40 mL IntraVENous PRN    acetaminophen (TYLENOL) tablet 650 mg  650 mg Oral Q6H PRN    Or    acetaminophen (TYLENOL) suppository 650 mg  650 mg Rectal Q6H PRN    polyethylene glycol (MIRALAX) packet 17 g  17 g Oral DAILY PRN    ondansetron (ZOFRAN ODT) tablet 4 mg  4 mg Oral Q8H PRN    Or    ondansetron (ZOFRAN) injection 4 mg  4 mg IntraVENous Q6H PRN    enoxaparin (LOVENOX) injection 40 mg  40 mg SubCUTAneous DAILY    alum-mag hydroxide-simeth (MYLANTA) oral suspension 15 mL  15 mL Oral Q6H PRN    insulin lispro (HUMALOG) injection   SubCUTAneous AC&HS    glucose chewable tablet 16 g  4 Tablet Oral PRN    dextrose (D50W) injection syrg 12.5-25 g  12.5-25 g IntraVENous PRN    glucagon (GLUCAGEN) injection 1 mg  1 mg IntraMUSCular PRN     Past Medical History:   Diagnosis Date    Arrhythmia     heart murmur    Chronic pain     Contact dermatitis and other eczema, due to unspecified cause     Diabetes (HCC)     GERD (gastroesophageal reflux disease)     Hepatic steatosis     Hypertension     Joint pain     & muscle aches    Migraine     Nephrolithiasis     Obesity     Sleep apnea     zapata not use c-pap    Von Willebrand disease ([facility] [address]      Patient Active Problem List   Diagnosis Code    Type 1 von Willebrand disease ([facility] [address] D68.0    Essential hypertension, benign I10    Obesity E66.9    PEGGY (obstructive sleep apnea) G47.33    GERD (gastroesophageal reflux disease) K21.9    Venous insufficiency I87.2    Nephrolithiasis N20.0    Allergic rhinitis J30.9    Asthma J45.909    Carpal tunnel syndrome G56.00    Basal cell carcinoma of nose C44.311    Migraine G43.909    Diabetes mellitus (HCC) E11.9    Goiter E04.9    HLD (hyperlipidemia) E78.5    Hepatic steatosis K76.0    Type 2 diabetes mellitus without complication (HCC) F98.5    Obesity, morbid (HCC) E66.01    Chest pain R07.9    Abnormal EKG R94.31     Allergies   Allergen Reactions    Pcn [Penicillins] Unknown (comments)     Given as a child and can't remember reaction     Social History     Tobacco Use    Smoking status: Never Smoker    Smokeless tobacco: Never Used   Substance Use Topics    Alcohol use: Yes     Alcohol/week: 1.7 standard drinks     Types: 1 Glasses of wine, 1 Cans of beer per week     Comment: rarely, 3x a month    Drug use: Not on file     Family History   Problem Relation Age of Onset    Hypertension Father     Stroke Father        Review of Symptoms:  A comprehensive review of systems was negative except for that written in the HPI. Objective:      Visit Vitals  /67 (BP 1 Location: Right upper arm, BP Patient Position: Sitting)   Pulse 71   Temp 97.9 °F (36.6 °C)   Resp 16   Ht 5' 1\" (1.549 m)   Wt 253 lb 1.4 oz (114.8 kg)   SpO2 96%   BMI 47.82 kg/m²      Physical Exam    Visit Vitals  /67 (BP 1 Location: Right upper arm, BP Patient Position: Sitting)   Pulse 71   Temp 97.9 °F (36.6 °C)   Resp 16   Ht 5' 1\" (1.549 m)   Wt 253 lb 1.4 oz (114.8 kg)   SpO2 96%   BMI 47.82 kg/m²     General Appearance:  Well developed, well nourished,alert and oriented x 3, and individual in no acute distress. Ears/Nose/Mouth/Throat:   Hearing grossly normal.         Neck: Supple. Chest:   Lungs clear to auscultation bilaterally. Cardiovascular:  Regular rate and rhythm, S1, S2 normal, no murmur.    Abdomen: Soft, non-tender, bowel sounds are active. Extremities: No edema bilaterally. Skin: Warm and dry. Cardiographics    Telemetry: normal sinus rhythm  ECG: normal sinus rhythm, LAD  Echocardiogram: Not done    Labs:   Recent Results (from the past 24 hour(s))   SAMPLES BEING HELD    Collection Time: 07/29/21  1:31 PM   Result Value Ref Range    SAMPLES BEING HELD 1red, 1lav, 1pst     COMMENT        Add-on orders for these samples will be processed based on acceptable specimen integrity and analyte stability, which may vary by analyte. CBC WITH AUTOMATED DIFF    Collection Time: 07/29/21  1:31 PM   Result Value Ref Range    WBC 9.0 3.6 - 11.0 K/uL    RBC 5.93 (H) 3.80 - 5.20 M/uL    HGB 14.6 11.5 - 16.0 g/dL    HCT 46.8 35.0 - 47.0 %    MCV 78.9 (L) 80.0 - 99.0 FL    MCH 24.6 (L) 26.0 - 34.0 PG    MCHC 31.2 30.0 - 36.5 g/dL    RDW 13.5 11.5 - 14.5 %    PLATELET 697 143 - 313 K/uL    MPV 9.8 8.9 - 12.9 FL    NRBC 0.0 0  WBC    ABSOLUTE NRBC 0.00 0.00 - 0.01 K/uL    NEUTROPHILS 68 32 - 75 %    LYMPHOCYTES 23 12 - 49 %    MONOCYTES 7 5 - 13 %    EOSINOPHILS 2 0 - 7 %    BASOPHILS 0 0 - 1 %    IMMATURE GRANULOCYTES 0 0.0 - 0.5 %    ABS. NEUTROPHILS 6.1 1.8 - 8.0 K/UL    ABS. LYMPHOCYTES 2.1 0.8 - 3.5 K/UL    ABS. MONOCYTES 0.6 0.0 - 1.0 K/UL    ABS. EOSINOPHILS 0.2 0.0 - 0.4 K/UL    ABS. BASOPHILS 0.0 0.0 - 0.1 K/UL    ABS. IMM.  GRANS. 0.0 0.00 - 0.04 K/UL    DF AUTOMATED     METABOLIC PANEL, COMPREHENSIVE    Collection Time: 07/29/21  1:31 PM   Result Value Ref Range    Sodium 138 136 - 145 mmol/L    Potassium 3.4 (L) 3.5 - 5.1 mmol/L    Chloride 99 97 - 108 mmol/L    CO2 30 21 - 32 mmol/L    Anion gap 9 5 - 15 mmol/L    Glucose 152 (H) 65 - 100 mg/dL    BUN 12 6 - 20 MG/DL    Creatinine 0.90 0.55 - 1.02 MG/DL    BUN/Creatinine ratio 13 12 - 20      GFR est AA >60 >60 ml/min/1.73m2    GFR est non-AA >60 >60 ml/min/1.73m2    Calcium 9.1 8.5 - 10.1 MG/DL    Bilirubin, total 0.6 0.2 - 1.0 MG/DL ALT (SGPT) 39 12 - 78 U/L    AST (SGOT) 39 (H) 15 - 37 U/L    Alk.  phosphatase 88 45 - 117 U/L    Protein, total 7.8 6.4 - 8.2 g/dL    Albumin 3.6 3.5 - 5.0 g/dL    Globulin 4.2 (H) 2.0 - 4.0 g/dL    A-G Ratio 0.9 (L) 1.1 - 2.2     LIPASE    Collection Time: 07/29/21  1:31 PM   Result Value Ref Range    Lipase 122 73 - 393 U/L   TROPONIN I    Collection Time: 07/29/21  1:31 PM   Result Value Ref Range    Troponin-I, Qt. <0.05 <0.05 ng/mL   EKG, 12 LEAD, INITIAL    Collection Time: 07/29/21  1:31 PM   Result Value Ref Range    Ventricular Rate 88 BPM    Atrial Rate 88 BPM    P-R Interval 176 ms    QRS Duration 82 ms    Q-T Interval 380 ms    QTC Calculation (Bezet) 459 ms    Calculated P Axis 38 degrees    Calculated R Axis -82 degrees    Calculated T Axis 31 degrees    Diagnosis       Normal sinus rhythm  Left anterior fascicular block  Nonspecific T wave abnormality  Poor R-wave Progression (consider lead placement or loss of anterior forces)  Abnormal ECG  When compared with ECG of 29-JAN-2013 12:04,  Left anterior fascicular block is now present  Left posterior fascicular block is no longer present  Confirmed by Thor Robert M.D., [place] ([id number]) on 7/29/2021 2:50:08 PM     TROPONIN I    Collection Time: 07/29/21  7:03 PM   Result Value Ref Range    Troponin-I, Qt. <0.05 <0.05 ng/mL   TROPONIN I    Collection Time: 07/30/21  5:57 AM   Result Value Ref Range    Troponin-I, Qt. <0.05 <0.05 ng/mL   LIPID PANEL    Collection Time: 07/30/21  5:57 AM   Result Value Ref Range    Cholesterol, total 144 <200 MG/DL    Triglyceride 185 (H) <150 MG/DL    HDL Cholesterol 42 MG/DL    LDL, calculated 65 0 - 100 MG/DL    VLDL, calculated 37 MG/DL    CHOL/HDL Ratio 3.4 0.0 - 5.0     GLUCOSE, POC    Collection Time: 07/30/21  7:20 AM   Result Value Ref Range    Glucose (POC) 170 (H) 65 - 117 mg/dL    Performed by [facility] [place] - JANETH CHEN    EKG, 12 LEAD, INITIAL    Collection Time: 07/30/21  7:53 AM   Result Value Ref Range    Ventricular Rate 70 BPM    Atrial Rate 70 BPM    P-R Interval 178 ms    QRS Duration 90 ms    Q-T Interval 420 ms    QTC Calculation (Bezet) 453 ms    Calculated P Axis -2 degrees    Calculated R Axis 24 degrees    Calculated T Axis 24 degrees    Diagnosis       Normal sinus rhythm  Inferior infarct (cited on or before 30-JUL-2021)  Abnormal ECG  When compared with ECG of 29-JUL-2021 13:31,  No significant change was found         Edison Ramsey MD

## 2021-07-30 NOTE — DIABETES MGMT
[address]    CLINICAL NURSE SPECIALIST CONSULT     INITIAL NOTE    Initial Presentation   Elva Allen is a 61 y.o. female admitted from [place] ED with c/o  intermittent upper abdominal pain that radiated into her chest accompanied by nausea/ and dizziness. Initial work up and evaluation consistent with normal EKG with new left anterior fascicular block/ serial troponin WNL/       HX:   Past Medical History:   Diagnosis Date    Arrhythmia     heart murmur    Chronic pain     Contact dermatitis and other eczema, due to unspecified cause     Diabetes (HCC)     GERD (gastroesophageal reflux disease)     Hepatic steatosis     Hypertension     Joint pain     & muscle aches    Migraine     Nephrolithiasis     Obesity     Sleep apnea     zapata not use c-pap    Von Willebrand disease ([facility] [address]         INITIAL DX: chest pain work up    Treatment plan     TX: Cardiology consult/ lipid panel/    Hospital course   Clinical progress has been uncomplicated thus far, awaiting cardiology consult    Diabetes    Patient has known Type 2 diabetes, treated with Metformin/ Amaryl PTA. Admission -170 and A1c 9.0%  Indicate less than adequate diabetes control. Ambulatory blood glucose management provided by primary care provider- Dr. Ellie Ware, last office visit 5/2021. Has follow up in Sept. 2020.      Consulted by Provider for advanced diabetes nursing assessment and care, specifically related to   [] Transitioning off Shearon Se   [] Inpatient management strategy  [x] Home management assessment  [] Survival skill education    Diabetes-related medical history  Acute complications  NONE  Neurological complications  NONE  Microvascular disease  NONE  Macrovascular disease  NONE  Other associated conditions     HTN (takes BP med)/obesity/PEGGY  Takes statin    Diabetes medication history  Drug class Currently in use Discontinued Never used   Biguanide Metformin 500mg (1 tab twice dailly)     DDP-4 inhibitor       Sulfonylurea Amaryl 4mg daily with meal  Just started on this medication 3weeks ago     Thiazolidinedione      GLP-1 RA      SGLT-2 inhibitors      Basal insulin      Bolus insulin      Fixed Dose  Combinations        Subjective   I've been there, done that and heard the diet stuff with diabetes\"     Spouse in the room with patient, did not add to conversation  Been diabetic for years, followed by PCP  Was supposed to meet with dietician next week to discuss her diet  Just started on Amaryl 3weeks ago, has Ozempic from PCP, but was told by PCP to \"wait\" until next appt. To take. Patient reports the following home diabetes self-care practices:  Eating pattern-\"I'm [place] and do love my pasta and bread, but don't eat it all the time\", \"I am an emotional eater too\". \" I really don't eat as much as you think\"    Physical activity pattern-\"Im more sedentary than I was 3-4yrs ago\"    Monitoring pattern-\" I don't check it\"-     Taking medications pattern  [x] Consistent administration  [x] Affordable    Social determinants of health impacting diabetes self-management practices   Concerned that you need to know more about how to stay healthy with diabetes     Objective   Physical exam  General Obese/normal female in no acute distress. Conversant and cooperative  Neuro  Alert, oriented   Vital Signs   Visit Vitals  /67 (BP 1 Location: Right upper arm, BP Patient Position: Sitting)   Pulse 71   Temp 97.9 °F (36.6 °C)   Resp 16   Ht 5' 1\" (1.549 m)   Wt 114.8 kg (253 lb 1.4 oz)   SpO2 96%   BMI 47.82 kg/m²     Skin  Warm and dry. Heart   Regular rate and rhythm. No murmurs, rubs or gallops  Lungs  Clear to auscultation without rales or rhonchi  Extremities No foot wounds    Diabetic foot exam: deferred,         Laboratory  Tests 7/30/21   A1c 9.0%      Anion gap -   Serum triglycerides 185   WBC -   Serum creatinine . 90   GFR >60   AST 39   ALT 39   Troponin x3  <.05 LDL 65   cholesterol 144     Factors impacting BG management  Factor Dose Comments   Nutrition:  NPO       Pain Upper abdominal/chest pain    Infection     Other:            Assessment and Plan   Nursing Diagnosis Risk for unstable blood glucose pattern   Nursing Intervention Domain 5254 Decision-making Support   Nursing Interventions Examined current inpatient diabetes control   Explored factors facilitating and impeding inpatient management  Identified self-management practices impeding diabetes control  Explored corrective strategies with patient and responsible inpatient provider   Informed patient of rational for insulin strategy while hospitalized    Instructed patient in :  - checking blood sugar once daily in AM, record results (already has glucometer)  -consider starting to exercise daily (15min to start, if possible)     Evaluation   This  female, with Type 2 diabetes, did not achieve diabetes control prior to admission, as evidenced by admission BG of 150-170 and A1c of 9.0% . Currently admitted for chest pain work up. Re: diabetes management, she is followed by her PCP. She reports she was just started on Amaryl about 3 weeks ago, and also had a script filled for Ozempic (from PCP) that she has not started yet. She admits to compliance with taking diabetes meds, not checking BG regularly. She expresess her frustration, \"if it's high what am I supposed to do about it? \"- Verbalized understanding her frustration with this. She also admits her diet is not ideal and she is an emotional eater. She verbalized that she is more sedentary compared to 3-4 yrs ago.  today  NPO  Recommendations   1.  IF BG trends consistently >200, despite correctional insulin, consider recs below  [] Use of Subcutaneous Insulin Order set (1935)  Insulin Dosing Specific recommendation   Basal                                      (Based on weight, BMI & GFR) [x]0.1 units/kg/D =10units Lantus daily CONTINUE Corrective                                       (Useful in adjusting insulin dosing) [x] Normal sensitivity        2. Carb consistent diet -60gm when allowed to eat  Discharge Planning   1. A1C 9.0%,Would not change any PTA meds at this time as she just started the Amaryl and does have Ozempic on had that she will be starting soon. This should assist with overall BG control and decrease A1C as well    2. Will be meeting with a dietician in coming weeks      Billing Code(s)   61229    Before making these care recommendations, I personally reviewed the hospitalization record, including notes, laboratory & diagnostic data and current medications, and examined the patient at the bedside (circumstances permitting) before making care recommendations.      Total minutes: [address], [facility]  Diabetes Clinical Nurse Specialist  [facility]  Access via [address] [place] [address]

## 2021-07-30 NOTE — DISCHARGE INSTRUCTIONS
Discharge Instructions       PATIENT ID: Joseph Sotelo  MRN: [id number]   YOB: 1960    DATE OF ADMISSION: 7/29/2021  1:19 PM    DATE OF DISCHARGE: 7/30/2021    PRIMARY CARE PROVIDER: Quang Montano MD     ATTENDING PHYSICIAN: Sierra Almaguer MD  DISCHARGING PROVIDER: Aisha Rahman MD    To contact this individual call [phone] and ask the  to page. If unavailable ask to be transferred the Adult Hospitalist Department. DISCHARGE DIAGNOSES   Atypical chest pain. Stress test unremarkable. Likely orthostatic related to Moxzide  T2DM with hyperglycemia, Last A1c May 2021 was 9. Obesity  Hypertension. History of sleep apnea, no using any CPAP    CONSULTATIONS: IP CONSULT TO CARDIOLOGY    PROCEDURES/SURGERIES: * No surgery found *    PENDING TEST RESULTS:   At the time of discharge the following test results are still pending: none    FOLLOW UP APPOINTMENTS:   Follow-up Information     Follow up With Specialties Details Why Contact Info    Quang Montano MD Internal Medicine In 5 days PCP: Post hospital discharge follow up within 3-7 days with CBC, CMP, and BP monitoring [address] [facility]  [address]      Kwame Contreras MD Cardiology In 2 weeks Cardiology: follow up with your primary cardiology office in 2-4 weeks or as needed before or after that [facility]  [phone]             ADDITIONAL CARE RECOMMENDATIONS:   Follow up with PCP with CBC, CMP and BP monitoring  · It is important that you take the medication exactly as they are prescribed. · Keep your medication in the bottles provided by the pharmacist and keep a list of the medication names, dosages, and times to be taken in your wallet. · Do not take other medications without consulting your doctor. · No drinking alcohol or driving car or operating machinery if you are on narcotic pain medications.  Donot take sedating mediations if you are sleepy or confused. · Fall Precautions  · Keep Well Hydrated  · Report to your medical provider if you feel you have  developed allergies to medications  · Follow up with your PCP or Consultant for medication adjustments and refills  · Monitor for signs of fevers,chills,bleeding,chest pain and seek medical attention if you do so. DIET: Cardiac Diet and Diabetic Diet    ACTIVITY: Activity as tolerated    WOUND CARE: NA    EQUIPMENT needed: NA      Radiology:  XR CHEST PA LAT    Result Date: 7/29/2021   impression: Negative.     Labs:  Recent Results (from the past 24 hour(s))   TROPONIN I    Collection Time: 07/29/21  7:03 PM   Result Value Ref Range    Troponin-I, Qt. <0.05 <0.05 ng/mL   TROPONIN I    Collection Time: 07/30/21  5:57 AM   Result Value Ref Range    Troponin-I, Qt. <0.05 <0.05 ng/mL   LIPID PANEL    Collection Time: 07/30/21  5:57 AM   Result Value Ref Range    Cholesterol, total 144 <200 MG/DL    Triglyceride 185 (H) <150 MG/DL    HDL Cholesterol 42 MG/DL    LDL, calculated 65 0 - 100 MG/DL    VLDL, calculated 37 MG/DL    CHOL/HDL Ratio 3.4 0.0 - 5.0     GLUCOSE, POC    Collection Time: 07/30/21  7:20 AM   Result Value Ref Range    Glucose (POC) 170 (H) 65 - 117 mg/dL    Performed by Ramon Landis    EKG, 12 LEAD, INITIAL    Collection Time: 07/30/21  7:53 AM   Result Value Ref Range    Ventricular Rate 70 BPM    Atrial Rate 70 BPM    P-R Interval 178 ms    QRS Duration 90 ms    Q-T Interval 420 ms    QTC Calculation (Bezet) 453 ms    Calculated P Axis -2 degrees    Calculated R Axis 24 degrees    Calculated T Axis 24 degrees    Diagnosis       Normal sinus rhythm  Nonspecific T wave abnormality  Inferior Q waves of questionable significance  Abnormal ECG  When compared with ECG of 29-JUL-2021 13:31,  No significant change was found  Confirmed by Iza Jones M.D., Destiny Willett ([id number]) on 7/30/2021 1:08:22 PM     GLUCOSE, POC    Collection Time: 07/30/21 11:38 AM   Result Value Ref Range    Glucose (POC) 132 (H) 65 - 117 mg/dL    Performed by Lucille leal RN    NUCLEAR CARDIAC STRESS TEST    Collection Time: 07/30/21  3:08 PM   Result Value Ref Range    Target  bpm    Exercise duration time 00:03:00     Stress Base Systolic  mmHg    Stress Base Diastolic BP 69 mmHg    Post peak  BPM    Baseline HR 74 BPM    Estimated workload 1.0 METS    Baseline  mmHg    Percent HR 63 %    ST Elevation (mm) 0 mm    ST Depression (mm) 0 mm    Stress Base Diastolic BP 72 mmHg    Stress Rate Pressure Product 16,800 BPM*mmHg    Stress Stage 1 Duration 1 min:sec    Stress Stage 1 HR 84 bpm    Stress Stage 2 Duration 1 min:sec    Stress Stage 2 HR 99 bpm    Stress Stage 2 /77 mmHg    Stress Stage 3 HR 98 bpm    Recovery Stage 1 Duration 1 min:sec    Recovery Stage 1 HR 91 bpm    Recovery Stage 1 /69 mmHg    Recovery Stage 2 Duration 1 min:sec    Recovery Stage 2 HR 86 bpm     NM Stress test 7/30/21:  Impression:   Normal gated rest/stress myocardial perfusion imaging study. No evidence of myocardial ischemia or infarction. Left ventricular ejection fraction is 65 %. DISCHARGE MEDICATIONS:   See Medication Reconciliation Form    · It is important that you take the medication exactly as they are prescribed. · Keep your medication in the bottles provided by the pharmacist and keep a list of the medication names, dosages, and times to be taken in your wallet. · Do not take other medications without consulting your doctor. NOTIFY YOUR PHYSICIAN FOR ANY OF THE FOLLOWING:   Fever over 101 degrees for 24 hours. Chest pain, shortness of breath, fever, chills, nausea, vomiting, diarrhea, change in mentation, falling, weakness, bleeding. Severe pain or pain not relieved by medications. Or, any other signs or symptoms that you may have questions about.       DISPOSITION:  x  Home With:   OT  PT  KEVIN  RN       SNF/Inpatient Rehab/LTAC    Independent/assisted living    Hospice Other:     CDMP Checked:   Yes x     PROBLEM LIST Updated:  Yes x        My Medications      START taking these medications      Instructions Each Dose to Equal Morning Noon Evening Bedtime   hydroCHLOROthiazide 12.5 mg tablet  Commonly known as: HYDRODIURIL  Start taking on: July 31, 2021    Your last dose was: Your next dose is: Take 1 Tablet by mouth daily. 12.5 mg                 losartan 50 mg tablet  Commonly known as: COZAAR  Start taking on: July 31, 2021    Your last dose was: Your next dose is: Take 1 Tablet by mouth daily. 50 mg                 pantoprazole 40 mg tablet  Commonly known as: PROTONIX    Your last dose was: Your next dose is: Take 1 Tablet by mouth daily. 40 mg                    CONTINUE taking these medications      Instructions Each Dose to Equal Morning Noon Evening Bedtime   ALPRAZolam 0.25 mg tablet  Commonly known as: XANAX    Your last dose was: Your next dose is:         TAKE 1 TABLET BY MOUTH TWICE DAILY AS NEEDED FOR ANXIETY                  * Blood-Glucose Meter monitoring kit    Your last dose was: Your next dose is:         Use as directed                  * Blood-Glucose Meter monitoring kit    Your last dose was: Your next dose is:         Check fsbs bid 250.02                  cyclobenzaprine 10 mg tablet  Commonly known as: FLEXERIL    Your last dose was: Your next dose is: Take 1 Tab by mouth three (3) times daily as needed for Muscle Spasm(s). 10 mg                 glimepiride 4 mg tablet  Commonly known as: AMARYL    Your last dose was: Your next dose is: Take 1 Tablet by mouth every morning. 4 mg                 * glucose blood VI test strips strip  Commonly known as: FreeStyle Test    Your last dose was:      Your next dose is:         Freestyle freedom lite test trips  -check fsbs every day  250.00                  * glucose blood VI test strips strip  Commonly known as: ASCENSIA AUTODISC VI, ONE TOUCH ULTRA TEST VI    Your last dose was: Your next dose is:         Check fsbs bid 250.02                  lancets Misc    Your last dose was: Your next dose is:         Check fsbs bid 250.02                  metFORMIN 500 mg tablet  Commonly known as: GLUCOPHAGE    Your last dose was: Your next dose is: Take 1 Tablet by mouth two (2) times daily (with meals). 2 tabs qd   500 mg                 Otezla 30 mg Tab  Generic drug: apremilast    Your last dose was: Your next dose is:                         pravastatin 20 mg tablet  Commonly known as: PRAVACHOL    Your last dose was: Your next dose is: Take 1 Tablet by mouth nightly. 20 mg                     * This list has 4 medication(s) that are the same as other medications prescribed for you. Read the directions carefully, and ask your doctor or other care provider to review them with you.             STOP taking these medications    triamterene-hydroCHLOROthiazide 37.5-25 mg per tablet  Commonly known as: Asha Duran              Where to Get Your Medications      These medications were sent to [address] AT [address] [place] [id number]    Phone: [phone]   · hydroCHLOROthiazide 12.5 mg tablet  · losartan 50 mg tablet  · pantoprazole 40 mg tablet         Signed:   Wendy De La Cruz MD  7/30/2021  3:46 PM

## 2021-07-30 NOTE — PROGRESS NOTES
Problem: Falls - Risk of  Goal: *Absence of Falls  Description: Document Tho Teresa Fall Risk and appropriate interventions in the flowsheet.   Outcome: Progressing Towards Goal  Note: Fall Risk Interventions:            Medication Interventions: Evaluate medications/consider consulting pharmacy

## 2021-08-02 ENCOUNTER — PATIENT OUTREACH (OUTPATIENT)
Dept: CASE MANAGEMENT | Age: 61
End: 2021-08-02

## 2021-08-02 NOTE — ACP (ADVANCE CARE PLANNING)
Spoke with Ms. Hesham Cash- she states she does not have in place. States spouse, Leroy Sofia is primary decision maker. She said her father is a . CTN offered assistance with completing documents if needed.

## 2021-08-02 NOTE — PROGRESS NOTES
Care Transitions Initial Call    Call within 2 business days of discharge: Yes     Patient: Luciana Clinton Patient : 1960 MRN: [id number]    [facility]       Complaint Diagnosis Description Type Department Provider    21 Chest Pain; Dizziness Chest pain, unspecified type . .. ED to Hosp-Admission (Discharged) (ADMIT) Karuna Carlson MD; Maia Gonsalez... Was this an external facility discharge? No     Challenges to be reviewed by the provider   Additional needs identified to be addressed with provider: yes    Dizziness- some chest tightness with nausea. Cardiology consulted- ACS work up negative. Changed medications to reduce orthostasis- stopped maxzide 37.5 mg-25 mg. New: losartan 50 mg daily, HCTZ 12.5 mg daily. New Pantoprazole 40 mg daily. Does not own BP cuff- asked her to obtain and check sitting and standing BP values. Maintain hydration. DM2- A1C on 21- 9.0. CTN not able to do complete medication review- notes indicate not taking all currently listed medications for DM- Metformin and Amaryl. Chart indicates not eating regular meals. Patient states she has glucometer but does not regularly check BG values. Labs:   21- K= 3.4. Method of communication with provider : chart routing    Discussed [id number] related testing which was not done at this time. Advance Care Planning:   Does patient have an Advance Directive: not on file. States she does not have in place. States spouse, Neena Pedraza is primary decision maker. She said her father is a . Inpatient Readmission Risk score: No data recorded  Was this a readmission? no    Patients top risk factors for readmission: lack of knowledge about disease, level of motivation and medical condition-.    Interventions to address risk factors: Education of patient/family/caregiver/guardian to support self-management-possible SE of medications and orthostatsis BP values and Assessment and support for treatment adherence and medication management-monitoring for continued SE of meds taking, monitoring BP values    Care Transition Nurse (CTN) contacted the patient by telephone to perform post hospital discharge assessment. Verified name and  with patient as identifiers. Provided introduction to self, and explanation of the CTN role. CTN reviewed discharge instructions, medical action plan and red flags with patient who verbalized understanding. Were discharge instructions available to patient? yes. Reviewed appropriate site of care based on symptoms and resources available to patient including: PCP, Specialist, [employer] and CTN. Patient given an opportunity to ask questions and does not have any further questions or concerns at this time. The patient agrees to contact the PCP office for questions related to their healthcare. Partial medication reconciliation was performed with patient, who verbalizes understanding of administration of home medications. Advised obtaining a 90-day supply of all daily and as-needed medications. Referral to Pharm D needed: no     Home Health/Outpatient orders at discharge: none    Durable Medical Equipment ordered at discharge: None  CTN asked patient to obtain BP cuff and begin monitoring BP values-including sitting to standing values. Covid Risk Education    Educated patient about risk for severe COVID-19 due to risk factors according to CDC guidelines. CTN reviewed discharge instructions, medical action plan and red flag symptoms with the patient who verbalized understanding. Discussed COVID vaccination status: no. Education provided on COVID-19 vaccination as appropriate. Discussed exposure protocols and quarantine with CDC Guidelines. Patient was given an opportunity to verbalize any questions and concerns and agrees to contact CTN or health care provider for questions related to their healthcare.     Was patient discharged with a pulse oximeter? NA    Discussed follow-up appointments. If no appointment was previously scheduled, appointment scheduling offered: she states she will call to schedule cardiology. Is follow up appointment scheduled within 7 days of discharge? yes. [facility] follow up appointment(s):   Future Appointments   Date Time Provider Bhavesh Mckoy   8/5/2021  2:45 PM Leon Evangelista MD [facility] AMB   9/10/2021  9:30 AM Leon Evangelista MD [facility] AMB     [facility] follow up appointment(s):   Cardiology- Dr. Miles Grounds for follow-up call in 7-10 days based on severity of symptoms and risk factors. Plan for next call: self management-monitoring BP values- including sitting and standing and follow up appointment-with cardiology scheduled, post PCP visit. CTN provided contact information for future needs. Goals Addressed                 This Visit's Progress       General     Reduce Risk of Hospitalization        8/2/21- spoke with Ms. Marissa Amin. She was not at home right now. She agreed to talk with CTN. She said she is feeling better- has not had further episodes of dizziness, chest tightness. She is aware of cardiology testing done- states she will call to schedule follow up with Dr. Nicole Alcala.    Discussion about possible orthostatsis contributing to symptoms- explained what happens with BP values when changing positions. Explained reasons for changing cardiology-BP meds including diuretic. She does not own BP cuff- asked her to obtain- explained good routine for checking BP- asked her to check ortho values- sitting to standing. Write values down and review with PCP and cardiology. She states she does check her BG values at home. Not regularly. Asked her to maintain hydration- explained hydrating fluids- recommended 48-64 ounces per day. Stressed importance of eating regular meals when taking diabetes medications. Explained reason for prescribing pantoprazole med.  She will monitor for continued symptoms and review with PCP.       [employer]

## 2021-08-05 ENCOUNTER — OFFICE VISIT (OUTPATIENT)
Dept: INTERNAL MEDICINE CLINIC | Age: 61
End: 2021-08-05
Payer: COMMERCIAL

## 2021-08-05 VITALS
SYSTOLIC BLOOD PRESSURE: 120 MMHG | WEIGHT: 263 LBS | DIASTOLIC BLOOD PRESSURE: 70 MMHG | HEART RATE: 76 BPM | TEMPERATURE: 97.7 F | BODY MASS INDEX: 49.65 KG/M2 | OXYGEN SATURATION: 96 % | RESPIRATION RATE: 16 BRPM | HEIGHT: 61 IN

## 2021-08-05 DIAGNOSIS — R07.9 CHEST PAIN, UNSPECIFIED TYPE: ICD-10-CM

## 2021-08-05 DIAGNOSIS — I10 ESSENTIAL HYPERTENSION: Primary | ICD-10-CM

## 2021-08-05 PROCEDURE — 99214 OFFICE O/P EST MOD 30 MIN: CPT | Performed by: INTERNAL MEDICINE

## 2021-08-05 RX ORDER — LOSARTAN POTASSIUM 50 MG/1
50 TABLET ORAL DAILY
Qty: 90 TABLET | Refills: 3 | Status: SHIPPED | OUTPATIENT
Start: 2021-08-05 | End: 2022-08-15 | Stop reason: SDUPTHER

## 2021-08-05 RX ORDER — HYDROCHLOROTHIAZIDE 12.5 MG/1
12.5 TABLET ORAL DAILY
Qty: 90 TABLET | Refills: 3 | Status: SHIPPED | OUTPATIENT
Start: 2021-08-05 | End: 2022-08-15 | Stop reason: SDUPTHER

## 2021-08-05 NOTE — PATIENT INSTRUCTIONS
Office Policies    Phone calls/patient messages:            Please allow up to 24 hours for someone in the office to contact you about your call or message. Be mindful your provider may be out of the office or your message may require further review. We encourage you to use [email or web address] for your messages as this is a faster, more efficient way to communicate with our office                         Medication Refills:            Prescription medications require 48-72 business hours to process. We encourage you to use [email or web address] for your refills. For controlled medications: Please allow 72 business hours to process. Certain medications may require you to  a written prescription at our office. NO narcotic/controlled medications will be prescribed after 4pm Monday through Friday or on weekends              Form/Paperwork Completion:            Please note a $25 fee may incur for all paperwork for completed by our providers. We ask that you allow 7-10 business days. Pre-payment is due prior to picking up/faxing the completed form. You may also download your forms to [email or web address] to have your doctor print off.

## 2021-08-05 NOTE — PROGRESS NOTES
HISTORY OF PRESENT ILLNESS  Elzbieta Godwin is a 61 y.o. female. HPI      Hx  DM-2 HTN HLD obesity insomnia  Here for [place]  Admitted to [facility] for anxious feeling on stomach to chest and mental fogginess x 3 days  R/o MI  NST-negative  Dx with GERD though not having regurgiation for classic gerd symptoms  Denies feeling anxious or stressed  potassaium was low--maxzide changed to losartan and hctz    fsbs in hospitall 130-170-amaryl was added 3 months ago for a1c 9.0    Last OV    MRN: [id number]   YOB: 1960    DATE OF ADMISSION: 7/29/2021  1:19 PM    DATE OF DISCHARGE: 07/30/21   PRIMARY CARE PROVIDER: Reba Nieto MD      ATTENDING PHYSICIAN: Zoran Szymanski MD  DISCHARGING PROVIDER: Zoran Szymanski MD    To contact this individual call [phone] and ask the  to page. If unavailable ask to be transferred the Adult Hospitalist Department.     CONSULTATIONS: IP CONSULT TO CARDIOLOGY     PROCEDURES/SURGERIES: * No surgery found *     [address]:   Atypical chest pain  T2DM with hyperglycemia  Obesity  Hypertension  History of sleep apnea     CC/ HPI:  Chest/abdomen discomfort, dizziness     Angélica Watt a 61 y.o. female with a PMH significant for obesity, T2DM, GERD, hepatic steatosis, HTN, migraines, sleep apnea and von Willebrand's disease transferred from the ED at [facility] for evaluation of intermittent upper abdominal pain that radiated into her chest.  Patient reported that approximately 4 days ago, she had several episodes of \"a weird, nervous and anxious feeling\" in her stomach that radiated up to her chest that was accompanied by a \"mental fogginess\".  She states this occurred several times, always when she moves from a sitting to a standing position.  As the week progressed, the episodes became more frequent and more intense, but were always associated with sitting/standing.  Many episodes were accompanied by nausea, but she never vomited.  Patient also explained that she never felt dizzy or lightheaded and never felt \"things spinning\".  She denies having had palpitations, cough or shortness of breath, but noted that several times she felt \"more winded\" after doing simple things like tying her shoes.     CXR was negative for acute process, EKG showed sinus rhythm 88 bpm with a new left anterior fascicular block.  First 2 troponins are negative.     Subjective: Follow up chest discomfort, dizziness  Patient seen and examined at the bedside. Labs, images and notes reviewed  Discussed with nursing staff, orders reviewed. Plan discussed with patient/Family  Patient is feeling well. Feels like she is ready to go home. Seen before stress test.  Subsequently post stress test cardiology recommendation noted for discharge. Patient wants to go home. No chest pain, shortness of breath, palpitation, dizziness or lightheadedness. Cardiology recommendation noted and Maxide discontinued with transition to losartan and HCTZ. Counseled patient for follow-up with cardiology as well as PCP with close monitoring of CBC, CMP and BP monitoring and management.     DISCHARGE DIAGNOSES / PLAN:       Atypical chest pain  · Intermittent abdomen/chest discomfort x4 days accompanied by \"mental fogginess\"  · Troponins normal, no acute changes on EKG  · Admitted to remote telemetry  · Cardiology consult. Appreciate recommendations  · Lexiscan Myoview stress test on 7/30/2021. Noted. · No further cardiac work-up as per cardiology based on the stress test results. · Continue PPI on discharge. · Discontinue Maxzide. Initiate losartan 50 mg daily along with HCTZ 12.5 mg daily as per cardiology recommendations. · Recommend close PCP follow-up within 3-7 days with CBC, CMP along with blood pressure monitoring and post hospital discharge follow-up  · Likely related to orthostasis per cardiology.   · Continue other medications as before  · Okay to DC from cardiology standpoint.     T2DM with hyperglycemia  · Hold oral antihyperglycemics  on admission. Will resume back on discharge  · Last A1c May 2021 was 9.  · Diabetic diet     Obesity  · Lifestyle modifications reiterated.     Hypertension  · Discontinue Maxzide as per cardiology  · Losartan 50 mg daily and HCTZ 12.5 mg daily initiated per cardiology recommendations  · Further close follow-up and monitoring with PCP/cardiology as noted     History of sleep apnea  · Does not use BiPAP     DC home today. Okay per cardiology.         ADDITIONAL CARE RECOMMENDATIONS:   Follow up with PCP with CBC, CMP and BP monitoring  · It is important that you take the medication exactly as they are prescribed. · Keep your medication in the bottles provided by the pharmacist and keep a list of the medication names, dosages, and times to be taken in your wallet. · Do not take other medications without consulting your doctor. · No drinking alcohol or driving car or operating machinery if you are on narcotic pain medications. Donot take sedating mediations if you are sleepy or confused.    · Fall Precautions  · Keep Well Hydrated  · Report to your medical provider if you feel you have  developed allergies to medications  · Follow up with your PCP or Consultant for medication adjustments and refills  · Monitor for signs of fevers,chills,bleeding,chest pain and seek medical attention if you do so.          DIET: Cardiac Diet and Diabetic Diet     ACTIVITY: Activity as tolerated     WOUND CARE: NA     EQUIPMENT needed: NA      PENDING TEST RESULTS:   At the time of discharge the following test results are still pending: None     FOLLOW UP APPOINTMENTS:             Follow-up Information      Follow up With Specialties Details Why Contact Nicole Cleveland MD Internal Medicine In 5 days PCP: Post hospital discharge follow up within 3-7 days with CBC, CMP, and BP monitoring [address]  [address] [address]  [phone]        Precious Lawton MD Cardiology In 2 weeks           Patient Active Problem List    Diagnosis Date Noted    Abnormal EKG 07/30/2021    Chest pain 07/29/2021    Obesity, morbid ([facility] 11/09/2018    Type 2 diabetes mellitus without complication ([facility] 98/25/5110    Hepatic steatosis 04/22/2015    HLD (hyperlipidemia) 06/28/2014    Goiter 06/27/2013    Diabetes mellitus ([facility] 11/03/2011    Type 1 von Willebrand disease ([facility] 02/12/2010    Essential hypertension, benign 02/12/2010    Obesity 02/12/2010    PEGGY (obstructive sleep apnea) 02/12/2010    GERD (gastroesophageal reflux disease) 02/12/2010    Venous insufficiency 02/12/2010    Nephrolithiasis 02/12/2010    Allergic rhinitis 02/12/2010    Asthma 02/12/2010    Carpal tunnel syndrome 02/12/2010    Basal cell carcinoma of nose 02/12/2010    Migraine 02/12/2010     Current Outpatient Medications   Medication Sig Dispense Refill    losartan (COZAAR) 50 mg tablet Take 1 Tablet by mouth daily. 90 Tablet 3    hydroCHLOROthiazide (HYDRODIURIL) 12.5 mg tablet Take 1 Tablet by mouth daily. 90 Tablet 3    pantoprazole (PROTONIX) 40 mg tablet Take 1 Tablet by mouth daily. 30 Tablet 0    metFORMIN (GLUCOPHAGE) 500 mg tablet Take 1 Tablet by mouth two (2) times daily (with meals). 2 tabs qd 120 Tablet 3    glimepiride (AMARYL) 4 mg tablet Take 1 Tablet by mouth every morning. 90 Tablet 1    pravastatin (PRAVACHOL) 20 mg tablet Take 1 Tablet by mouth nightly. 90 Tablet 3    OTEZLA 30 mg tab       ALPRAZolam (XANAX) 0.25 mg tablet TAKE 1 TABLET BY MOUTH TWICE DAILY AS NEEDED FOR ANXIETY 30 Tab 5    cyclobenzaprine (FLEXERIL) 10 mg tablet Take 1 Tab by mouth three (3) times daily as needed for Muscle Spasm(s).  (Patient not taking: Reported on 7/29/2021) 12 Tab 0    Blood-Glucose Meter monitoring kit Check fsbs bid 250.02 (Patient not taking: Reported on 7/29/2021) 1 Kit 0    glucose blood VI test strips (ASCENSIA AUTODISC VI, ONE TOUCH ULTRA TEST VI) strip Check fsbs bid 250.02 (Patient not taking: Reported on 7/29/2021) 100 Strip 5    lancets misc Check fsbs bid 250.02 (Patient not taking: Reported on 8/5/2021) 1 Each 11    glucose blood VI test strips (FREESTYLE TEST) strip Freestyle freedom lite test trips  -check fsbs every day  250.00 (Patient not taking: Reported on 7/29/2021) 100 Strip 11    Blood-Glucose Meter monitoring kit Use as directed (Patient not taking: Reported on 7/29/2021) 1 Kit 0     Allergies   Allergen Reactions    Pcn [Penicillins] Unknown (comments)     Given as a child and can't remember reaction      Lab Results   Component Value Date/Time    WBC 9.0 07/29/2021 01:31 PM    HGB 14.6 07/29/2021 01:31 PM    HCT 46.8 07/29/2021 01:31 PM    PLATELET 657 58/46/1220 01:31 PM    MCV 78.9 (L) 07/29/2021 01:31 PM     Lab Results   Component Value Date/Time    Hemoglobin A1c 9.0 (H) 05/21/2021 08:58 AM    Hemoglobin A1c 8.8 (H) 09/09/2020 03:54 PM    Hemoglobin A1c 8.1 (H) 02/09/2017 10:33 AM    Glucose 152 (H) 07/29/2021 01:31 PM    Glucose (POC) 132 (H) 07/30/2021 11:38 AM    Microalb/Creat ratio (ug/mg creat.) 22 09/09/2020 03:54 PM    LDL, calculated 65 07/30/2021 05:57 AM    Creatinine 0.90 07/29/2021 01:31 PM      Lab Results   Component Value Date/Time    Cholesterol, total 144 07/30/2021 05:57 AM    Cholesterol (POC) 188 02/26/2013 09:38 AM    HDL Cholesterol 42 07/30/2021 05:57 AM    LDL, calculated 65 07/30/2021 05:57 AM    LDL Cholesterol (POC) 101 02/26/2013 09:38 AM    Triglyceride 185 (H) 07/30/2021 05:57 AM    Triglycerides (POC) 184 02/26/2013 09:38 AM    CHOL/HDL Ratio 3.4 07/30/2021 05:57 AM     Lab Results   Component Value Date/Time    GFR est non-AA >60 07/29/2021 01:31 PM    GFR est AA >60 07/29/2021 01:31 PM    Creatinine 0.90 07/29/2021 01:31 PM    BUN 12 07/29/2021 01:31 PM    Sodium 138 07/29/2021 01:31 PM    Potassium 3.4 (L) 07/29/2021 01:31 PM    Chloride 99 07/29/2021 01:31 PM    CO2 30 07/29/2021 01:31 PM        ROS    Physical Exam  Vitals and nursing note reviewed. Constitutional:       Appearance: She is well-developed. She is obese. Comments: Appears stated age   Cardiovascular:      Rate and Rhythm: Normal rate and regular rhythm. Heart sounds: Normal heart sounds. No murmur heard. No friction rub. No gallop. Pulmonary:      Effort: Pulmonary effort is normal. No respiratory distress. Breath sounds: Normal breath sounds. No wheezing. Abdominal:      General: Bowel sounds are normal.      Palpations: Abdomen is soft. Neurological:      Mental Status: She is alert. ASSESSMENT and PLAN  Diagnoses and all orders for this visit:    1. Essential hypertension   Controlled   Refill losartan and HCTZ  2. Chest pain, unspecified type   Atypical    ? GERD--PPI every day for now   Fu Dr Camilo Rm MD    3. Mental fogginess   Monitor for hypoglycemia --discussed  Other orders  -     losartan (COZAAR) 50 mg tablet; Take 1 Tablet by mouth daily. -     hydroCHLOROthiazide (HYDRODIURIL) 12.5 mg tablet; Take 1 Tablet by mouth daily.

## 2021-10-14 ENCOUNTER — OFFICE VISIT (OUTPATIENT)
Dept: INTERNAL MEDICINE CLINIC | Age: 61
End: 2021-10-14
Payer: COMMERCIAL

## 2021-10-14 VITALS
SYSTOLIC BLOOD PRESSURE: 126 MMHG | HEART RATE: 86 BPM | HEIGHT: 61 IN | OXYGEN SATURATION: 97 % | WEIGHT: 264 LBS | TEMPERATURE: 98.2 F | BODY MASS INDEX: 49.84 KG/M2 | RESPIRATION RATE: 20 BRPM | DIASTOLIC BLOOD PRESSURE: 80 MMHG

## 2021-10-14 DIAGNOSIS — E66.01 MORBID OBESITY (HCC): ICD-10-CM

## 2021-10-14 DIAGNOSIS — E11.65 TYPE 2 DIABETES MELLITUS WITH HYPERGLYCEMIA, WITHOUT LONG-TERM CURRENT USE OF INSULIN (HCC): ICD-10-CM

## 2021-10-14 DIAGNOSIS — Z23 NEEDS FLU SHOT: Primary | ICD-10-CM

## 2021-10-14 DIAGNOSIS — F41.9 ANXIETY: ICD-10-CM

## 2021-10-14 DIAGNOSIS — Z12.11 COLON CANCER SCREENING: ICD-10-CM

## 2021-10-14 DIAGNOSIS — K21.9 GASTROESOPHAGEAL REFLUX DISEASE, UNSPECIFIED WHETHER ESOPHAGITIS PRESENT: ICD-10-CM

## 2021-10-14 DIAGNOSIS — G25.81 RLS (RESTLESS LEGS SYNDROME): ICD-10-CM

## 2021-10-14 DIAGNOSIS — E78.00 PURE HYPERCHOLESTEROLEMIA: ICD-10-CM

## 2021-10-14 DIAGNOSIS — E11.9 TYPE 2 DIABETES MELLITUS WITHOUT COMPLICATION, WITHOUT LONG-TERM CURRENT USE OF INSULIN (HCC): ICD-10-CM

## 2021-10-14 DIAGNOSIS — I10 HYPERTENSION, UNSPECIFIED TYPE: ICD-10-CM

## 2021-10-14 LAB — HBA1C MFR BLD HPLC: 8.1 % (ref 4.8–5.6)

## 2021-10-14 PROCEDURE — 99214 OFFICE O/P EST MOD 30 MIN: CPT | Performed by: INTERNAL MEDICINE

## 2021-10-14 PROCEDURE — 90686 IIV4 VACC NO PRSV 0.5 ML IM: CPT | Performed by: INTERNAL MEDICINE

## 2021-10-14 PROCEDURE — 83036 HEMOGLOBIN GLYCOSYLATED A1C: CPT | Performed by: INTERNAL MEDICINE

## 2021-10-14 PROCEDURE — 90471 IMMUNIZATION ADMIN: CPT | Performed by: INTERNAL MEDICINE

## 2021-10-14 RX ORDER — GABAPENTIN 100 MG/1
100 CAPSULE ORAL 3 TIMES DAILY
Qty: 90 CAPSULE | Refills: 5 | Status: SHIPPED | OUTPATIENT
Start: 2021-10-14 | End: 2022-08-29 | Stop reason: ALTCHOICE

## 2021-10-14 NOTE — PATIENT INSTRUCTIONS
Office Policies    Phone calls/patient messages:            Please allow up to 24 hours for someone in the office to contact you about your call or message. Be mindful your provider may be out of the office or your message may require further review. We encourage you to use [employer] for your messages as this is a faster, more efficient way to communicate with our office                         Medication Refills:            Prescription medications require 48-72 business hours to process. We encourage you to use [employer] for your refills. For controlled medications: Please allow 72 business hours to process. Certain medications may require you to  a written prescription at our office. NO narcotic/controlled medications will be prescribed after 4pm Monday through Friday or on weekends              Form/Paperwork Completion:            Please note a $25 fee may incur for all paperwork for completed by our providers. We ask that you allow 7-10 business days. Pre-payment is due prior to picking up/faxing the completed form. You may also download your forms to [employer] to have your doctor print off.

## 2021-10-14 NOTE — PROGRESS NOTES
HISTORY OF PRESENT ILLNESS  Etienne Pedro is a 64 y.o. female. HPI   FU  DM-2 HTN HLD obesity insomnia PEGGY GERD anxiey  Last a1c 9.0  LDL 65  Father passed this year--Parkinsons. ? Lung tumor    Ate too much recently while in [facility] screening tests all normal this year except BMI    Has Hx RLS-most nights of the weeks  Has occassional sharp pain in feet  Had sever b/l leg pain last 10d , had hand pain too-100 percent better today  Last OV       Here for [place]  Admitted to [facility] for anxious feeling on stomach to chest and mental fogginess x 3 days  R/o MI  NST-negative  Dx with GERD though not having regurgiation for classic gerd symptoms  Denies feeling anxious or stressed  potassaium was low--maxzide changed to losartan and hctz     fsbs in hospitall 130-170-amaryl was added 3 months ago for a1c 9.0       Patient Active Problem List    Diagnosis Date Noted    Abnormal EKG 07/30/2021    Chest pain 07/29/2021    Obesity, morbid ([address] 11/09/2018    Type 2 diabetes mellitus without complication ([address] 96/26/9763    Hepatic steatosis 04/22/2015    HLD (hyperlipidemia) 06/28/2014    Goiter 06/27/2013    Diabetes mellitus ([address] 11/03/2011    Type 1 von Willebrand disease ([address] 02/12/2010    Essential hypertension, benign 02/12/2010    Obesity 02/12/2010    PEGGY (obstructive sleep apnea) 02/12/2010    GERD (gastroesophageal reflux disease) 02/12/2010    Venous insufficiency 02/12/2010    Nephrolithiasis 02/12/2010    Allergic rhinitis 02/12/2010    Asthma 02/12/2010    Carpal tunnel syndrome 02/12/2010    Basal cell carcinoma of nose 02/12/2010    Migraine 02/12/2010     Current Outpatient Medications   Medication Sig Dispense Refill    losartan (COZAAR) 50 mg tablet Take 1 Tablet by mouth daily. 90 Tablet 3    hydroCHLOROthiazide (HYDRODIURIL) 12.5 mg tablet Take 1 Tablet by mouth daily.  90 Tablet 3    metFORMIN (GLUCOPHAGE) 500 mg tablet Take 1 Tablet by mouth two (2) times daily (with meals). 2 tabs qd 120 Tablet 3    glimepiride (AMARYL) 4 mg tablet Take 1 Tablet by mouth every morning. 90 Tablet 1    pravastatin (PRAVACHOL) 20 mg tablet Take 1 Tablet by mouth nightly. 90 Tablet 3    ALPRAZolam (XANAX) 0.25 mg tablet TAKE 1 TABLET BY MOUTH TWICE DAILY AS NEEDED FOR ANXIETY 30 Tab 5    OTEZLA 30 mg tab       pantoprazole (PROTONIX) 40 mg tablet Take 1 Tablet by mouth daily. (Patient not taking: Reported on 10/14/2021) 30 Tablet 0    cyclobenzaprine (FLEXERIL) 10 mg tablet Take 1 Tab by mouth three (3) times daily as needed for Muscle Spasm(s).  (Patient not taking: Reported on 7/29/2021) 12 Tab 0    Blood-Glucose Meter monitoring kit Check fsbs bid 250.02 (Patient not taking: Reported on 7/29/2021) 1 Kit 0    glucose blood VI test strips (ASCENSIA AUTODISC VI, ONE TOUCH ULTRA TEST VI) strip Check fsbs bid 250.02 (Patient not taking: Reported on 7/29/2021) 100 Strip 5    lancets misc Check fsbs bid 250.02 (Patient not taking: Reported on 8/5/2021) 1 Each 11    glucose blood VI test strips (FREESTYLE TEST) strip Freestyle freedom lite test trips  -check fsbs every day  250.00 (Patient not taking: Reported on 7/29/2021) 100 Strip 11    Blood-Glucose Meter monitoring kit Use as directed (Patient not taking: Reported on 7/29/2021) 1 Kit 0     Allergies   Allergen Reactions    Pcn [Penicillins] Unknown (comments)     Given as a child and can't remember reaction      Lab Results   Component Value Date/Time    WBC 9.0 07/29/2021 01:31 PM    HGB 14.6 07/29/2021 01:31 PM    HCT 46.8 07/29/2021 01:31 PM    PLATELET 794 71/10/1156 01:31 PM    MCV 78.9 (L) 07/29/2021 01:31 PM     Lab Results   Component Value Date/Time    Hemoglobin A1c 9.0 (H) 05/21/2021 08:58 AM    Hemoglobin A1c 8.8 (H) 09/09/2020 03:54 PM    Hemoglobin A1c 8.1 (H) 02/09/2017 10:33 AM    Glucose 152 (H) 07/29/2021 01:31 PM    Glucose (POC) 132 (H) 07/30/2021 11:38 AM    Microalb/Creat ratio (ug/mg creat.) 22 09/09/2020 03:54 PM    LDL, calculated 65 07/30/2021 05:57 AM    Creatinine 0.90 07/29/2021 01:31 PM      Lab Results   Component Value Date/Time    Cholesterol, total 144 07/30/2021 05:57 AM    Cholesterol (POC) 188 02/26/2013 09:38 AM    HDL Cholesterol 42 07/30/2021 05:57 AM    LDL, calculated 65 07/30/2021 05:57 AM    LDL Cholesterol (POC) 101 02/26/2013 09:38 AM    Triglyceride 185 (H) 07/30/2021 05:57 AM    Triglycerides (POC) 184 02/26/2013 09:38 AM    CHOL/HDL Ratio 3.4 07/30/2021 05:57 AM     Lab Results   Component Value Date/Time    GFR est non-AA >60 07/29/2021 01:31 PM    GFR est AA >60 07/29/2021 01:31 PM    Creatinine 0.90 07/29/2021 01:31 PM    BUN 12 07/29/2021 01:31 PM    Sodium 138 07/29/2021 01:31 PM    Potassium 3.4 (L) 07/29/2021 01:31 PM    Chloride 99 07/29/2021 01:31 PM    CO2 30 07/29/2021 01:31 PM     Lab Results   Component Value Date/Time    TSH 1.250 09/09/2020 03:54 PM    T4, Free 1.06 02/26/2013 10:07 AM      Lab Results   Component Value Date/Time    Glucose 152 (H) 07/29/2021 01:31 PM    Glucose (POC) 132 (H) 07/30/2021 11:38 AM         ROS    Physical Exam  Vitals and nursing note reviewed. Constitutional:       Appearance: She is well-developed. She is obese. Comments: Appears stated age   Cardiovascular:      Rate and Rhythm: Normal rate and regular rhythm. Heart sounds: Normal heart sounds. No murmur heard. No friction rub. No gallop. Pulmonary:      Effort: Pulmonary effort is normal. No respiratory distress. Breath sounds: Normal breath sounds. No wheezing. Abdominal:      General: Bowel sounds are normal.      Palpations: Abdomen is soft. Musculoskeletal:      Cervical back: Normal range of motion. Neurological:      General: No focal deficit present. Mental Status: She is alert.       Comments:      Diabetic foot exam performed by Madelyn Smiley MD       Measurement  Response Nurse Comment Physician Comment  Monofilament  R - normal sensation with micro filament  L - normal sensation with micro filament    Pulse DP R - 2+ (normal)  L - 2+ (normal)    Pulse TP R - 2+ (normal)  L - 2+ (normal)    Structural deformity R - None  L - None    Skin Integrity / Deformity R - None  L - None       Reviewed by:         Psychiatric:         Mood and Affect: Mood normal.         Behavior: Behavior normal.         Thought Content: Thought content normal.         Judgment: Judgment normal.         ASSESSMENT and PLAN  Diagnoses and all orders for this visit:    1. Needs flu shot  -     INFLUENZA VIRUS VAC QUAD,SPLIT,PRESV FREE SYRINGE IM    2. Type 2 diabetes mellitus without complication, without long-term current use of insulin ([facility])  -     [facility] POC HEMOGLOBIN A1C 8.1  -      DIABETES FOOT EXAM   Advised fsbs monitoring   Work on diet   Pt prefers to hold off on GLP-1     3. Type 2 diabetes mellitus with hyperglycemia, without long-term current use of insulin ([facility])  -      DIABETES FOOT EXAM    4. Hypertension, unspecified type   controlled  5. Pure hypercholesterolemia   LDL at goal  6. Morbid obesity ([facility]   I have reviewed/discussed the above normal BMI with the patient. I have recommended the following interventions: dietary management education, guidance, and counseling and encourage exercise . [place] [address] 7. Anxiety   Controlled-treat RLS   8. Gastroesophageal reflux disease, unspecified whether esophagitis present   Not requiring PPI now  9. Colon cancer screening  -     COLOGUARD TEST (FECAL DNA COLORECTAL CANCER SCREENING)    10. RLS (restless legs syndrome)  -     gabapentin (NEURONTIN) 100 mg capsule; Take 1 Capsule by mouth three (3) times daily. Max Daily Amount: 300 mg. Follow-up and Dispositions    · Return in about 4 months (around 2/14/2022) for dm-2 htnhld RLS.

## 2021-10-14 NOTE — PROGRESS NOTES
Davonte Dale is a 64 y.o. female who presents for routine immunizations. She denies any symptoms , reactions or allergies that would exclude them from being immunized today. Risks and adverse reactions were discussed and the VIS was given to them. All questions were addressed. She was observed [id number] post injection. There were no reactions observed.     Mika Starr LPN

## 2021-11-15 RX ORDER — GLIMEPIRIDE 4 MG/1
4 TABLET ORAL
Qty: 90 TABLET | Refills: 1 | Status: SHIPPED | OUTPATIENT
Start: 2021-11-15 | End: 2022-05-10

## 2021-12-01 DIAGNOSIS — F51.01 PRIMARY INSOMNIA: ICD-10-CM

## 2021-12-02 RX ORDER — ALPRAZOLAM 0.25 MG/1
TABLET ORAL
Qty: 30 TABLET | Refills: 1 | Status: SHIPPED | OUTPATIENT
Start: 2021-12-02 | End: 2022-05-04

## 2022-01-27 ENCOUNTER — TELEPHONE (OUTPATIENT)
Dept: INTERNAL MEDICINE CLINIC | Age: 62
End: 2022-01-27

## 2022-01-27 ENCOUNTER — DOCUMENTATION ONLY (OUTPATIENT)
Dept: INTERNAL MEDICINE CLINIC | Age: 62
End: 2022-01-27

## 2022-01-27 DIAGNOSIS — R19.5 POSITIVE COLORECTAL CANCER SCREENING USING COLOGUARD TEST: Primary | ICD-10-CM

## 2022-01-27 NOTE — TELEPHONE ENCOUNTER
Spoke with patient using 2 identifiers. Patient was informed of her cologuard result which was positive. Dr. Willy Aviles advised patient be referred to Dr. Davis Kelley for Colonoscopy. Copy of results and referral to Dr. Davis Kelley mailed to patient. Patient verbalized understanding.

## 2022-02-02 RX ORDER — METFORMIN HYDROCHLORIDE 500 MG/1
TABLET ORAL
Qty: 120 TABLET | Refills: 3 | Status: SHIPPED | OUTPATIENT
Start: 2022-02-02 | End: 2022-08-29 | Stop reason: CLARIF

## 2022-03-18 PROBLEM — R07.9 CHEST PAIN: Status: ACTIVE | Noted: 2021-07-29

## 2022-03-18 PROBLEM — R94.31 ABNORMAL EKG: Status: ACTIVE | Noted: 2021-07-30

## 2022-03-18 PROBLEM — F41.9 ANXIETY: Status: ACTIVE | Noted: 2021-10-14

## 2022-03-19 PROBLEM — E66.01 OBESITY, MORBID (HCC): Status: ACTIVE | Noted: 2018-11-09

## 2022-04-18 ENCOUNTER — PATIENT MESSAGE (OUTPATIENT)
Dept: INTERNAL MEDICINE CLINIC | Age: 62
End: 2022-04-18

## 2022-05-03 DIAGNOSIS — F51.01 PRIMARY INSOMNIA: ICD-10-CM

## 2022-05-04 RX ORDER — ALPRAZOLAM 0.25 MG/1
TABLET ORAL
Qty: 30 TABLET | Refills: 1 | Status: SHIPPED | OUTPATIENT
Start: 2022-05-04

## 2022-05-10 RX ORDER — PRAVASTATIN SODIUM 20 MG/1
TABLET ORAL
Qty: 90 TABLET | Refills: 3 | Status: SHIPPED | OUTPATIENT
Start: 2022-05-10

## 2022-05-10 RX ORDER — GLIMEPIRIDE 4 MG/1
4 TABLET ORAL
Qty: 90 TABLET | Refills: 1 | Status: SHIPPED | OUTPATIENT
Start: 2022-05-10

## 2022-06-02 ENCOUNTER — TELEPHONE (OUTPATIENT)
Dept: INTERNAL MEDICINE CLINIC | Age: 62
End: 2022-06-02

## 2022-06-02 NOTE — TELEPHONE ENCOUNTER
Milady Torres with [facility]  Direct to caller @ member services: [phone]  Provider Services: [phone]    States that pt recently had cologuard test.    States pt told [facility] that Dr Sunny Chan gave pt option of colonoscopy or Cologuard. States pt chose Cologuard. States pt asked dr of covered by insurance and states Dr Sunny Chan advised pt it was covered under pt plan. States cologuard is NOT covered. States pt billed for $510+ for test.  States Pt/[facility] not responsible since Dr advised that the procedure was covered. States needs to discuss how this cost can be dismissed or covered for pt. Please call to discuss the procedure and issue. States can call Milady Torres at member services directly or can contact the provider services line.

## 2022-06-06 NOTE — TELEPHONE ENCOUNTER
I have attempted to return the call several times but it is either an automated system, the phone number rings busy, or it just rings and someone hangs up.

## 2022-06-07 NOTE — TELEPHONE ENCOUNTER
Spoke with Chidi Roldan today. Provided information regarding Cologuard. She will reach out to patient and let us know if she needs any thing further.

## 2022-08-15 DIAGNOSIS — I10 ESSENTIAL HYPERTENSION, BENIGN: Primary | ICD-10-CM

## 2022-08-16 RX ORDER — LOSARTAN POTASSIUM 50 MG/1
50 TABLET ORAL DAILY
Qty: 90 TABLET | Refills: 3 | Status: SHIPPED | OUTPATIENT
Start: 2022-08-16

## 2022-08-16 RX ORDER — HYDROCHLOROTHIAZIDE 12.5 MG/1
12.5 TABLET ORAL DAILY
Qty: 90 TABLET | Refills: 3 | Status: SHIPPED | OUTPATIENT
Start: 2022-08-16

## 2022-08-16 NOTE — TELEPHONE ENCOUNTER
PCP: Nash Miller MD    Last appt: 10/14/2021  Future Appointments   Date Time Provider Bhavesh Mckoy   8/29/2022  2:30 PM Nash Miller MD [facility] BS AMB       Requested Prescriptions     Pending Prescriptions Disp Refills    losartan (COZAAR) 50 mg tablet 90 Tablet 3     Sig: Take 1 Tablet by mouth in the morning. hydroCHLOROthiazide (HYDRODIURIL) 12.5 mg tablet 90 Tablet 3     Sig: Take 1 Tablet by mouth in the morning.

## 2022-08-29 ENCOUNTER — OFFICE VISIT (OUTPATIENT)
Dept: INTERNAL MEDICINE CLINIC | Age: 62
End: 2022-08-29
Payer: COMMERCIAL

## 2022-08-29 VITALS
BODY MASS INDEX: 49.99 KG/M2 | HEART RATE: 110 BPM | HEIGHT: 61 IN | WEIGHT: 264.8 LBS | DIASTOLIC BLOOD PRESSURE: 69 MMHG | OXYGEN SATURATION: 96 % | SYSTOLIC BLOOD PRESSURE: 108 MMHG | TEMPERATURE: 97.2 F | RESPIRATION RATE: 16 BRPM

## 2022-08-29 DIAGNOSIS — F41.9 ANXIETY: ICD-10-CM

## 2022-08-29 DIAGNOSIS — D68.01 TYPE 1 VON WILLEBRAND DISEASE: ICD-10-CM

## 2022-08-29 DIAGNOSIS — F51.01 PRIMARY INSOMNIA: ICD-10-CM

## 2022-08-29 DIAGNOSIS — E66.01 MORBID OBESITY WITH BMI OF 50.0-59.9, ADULT (HCC): ICD-10-CM

## 2022-08-29 DIAGNOSIS — I10 HYPERTENSION, UNSPECIFIED TYPE: Primary | ICD-10-CM

## 2022-08-29 DIAGNOSIS — E11.65 TYPE 2 DIABETES MELLITUS WITH HYPERGLYCEMIA, WITHOUT LONG-TERM CURRENT USE OF INSULIN (HCC): ICD-10-CM

## 2022-08-29 DIAGNOSIS — E78.00 PURE HYPERCHOLESTEROLEMIA: ICD-10-CM

## 2022-08-29 PROCEDURE — 99214 OFFICE O/P EST MOD 30 MIN: CPT | Performed by: INTERNAL MEDICINE

## 2022-08-29 RX ORDER — METFORMIN HYDROCHLORIDE 500 MG/1
500 TABLET, EXTENDED RELEASE ORAL
Qty: 360 TABLET | Refills: 3 | Status: SHIPPED | OUTPATIENT
Start: 2022-08-29 | End: 2022-08-29 | Stop reason: CLARIF

## 2022-08-29 RX ORDER — OMEPRAZOLE 20 MG/1
CAPSULE, DELAYED RELEASE ORAL
COMMUNITY
Start: 2022-08-22

## 2022-08-29 RX ORDER — METFORMIN HYDROCHLORIDE 500 MG/1
2000 TABLET, EXTENDED RELEASE ORAL
Qty: 360 TABLET | Refills: 3 | Status: SHIPPED | OUTPATIENT
Start: 2022-08-29

## 2022-08-29 NOTE — PROGRESS NOTES
1. \"Have you been to the ER, urgent care clinic since your last visit? Hospitalized since your last visit? \" No    2. \"Have you seen or consulted any other health care providers outside of the [address] [place] [address] since your last visit? \" No     3. For patients aged 39-70: Has the patient had a colonoscopy / FIT/ Cologuard? Yes - no Care Gap present      If the patient is female:    4. For patients aged 41-77: Has the patient had a mammogram within the past 2 years? Yes - no Care Gap present      5. For patients aged 21-65: Has the patient had a pap smear?  Yes - no Care Gap present

## 2022-08-29 NOTE — PROGRESS NOTES
HISTORY OF PRESENT ILLNESS  Sade Okeefe is a 64 y.o. female. HPI  FU  DM-2 HTN HLD obesity insomnia PEGGY GERD anxiety  Last a1c 8.1 LDL 65  Fsbs on amaryl and metformin 500mg bid-no recent  Last mammogram-January [facility] yearly   Sees DERM Q 6 mos--hx BCC    Had recent colonoscopy--5 polyps--repeat had egd-small gastric ulcer--on omeprazole  Constant humming in left > right ear  Not sleeping well -mind will not shut off  Last OV  Last a1c 9.0  LDL 65  Father passed this year--Parkinsons. ? Lung tumor     Ate too much recently while in [facility] [address] screening tests all normal this year except BMI     Has Hx RLS-most nights of the weeks  Has occassional sharp pain in feet  Had sever b/l leg pain last 10d , had hand pain too-100 percent better today      Patient Active Problem List    Diagnosis Date Noted    Anxiety 10/14/2021    Abnormal EKG 07/30/2021    Chest pain 07/29/2021    Obesity, morbid ([address] 11/09/2018    Type 2 diabetes mellitus without complication ([facility] [address] 99/77/2541    Hepatic steatosis 04/22/2015    HLD (hyperlipidemia) 06/28/2014    Goiter 06/27/2013    Diabetes mellitus ([address] 11/03/2011    Type 1 von Willebrand disease ([facility] [address] 02/12/2010    Essential hypertension, benign 02/12/2010    Obesity 02/12/2010    PEGGY (obstructive sleep apnea) 02/12/2010    GERD (gastroesophageal reflux disease) 02/12/2010    Venous insufficiency 02/12/2010    Nephrolithiasis 02/12/2010    Allergic rhinitis 02/12/2010    Asthma 02/12/2010    Carpal tunnel syndrome 02/12/2010    Basal cell carcinoma of nose 02/12/2010    Migraine 02/12/2010     Current Outpatient Medications   Medication Sig Dispense Refill    losartan (COZAAR) 50 mg tablet Take 1 Tablet by mouth in the morning. 90 Tablet 3    hydroCHLOROthiazide (HYDRODIURIL) 12.5 mg tablet Take 1 Tablet by mouth in the morning.  90 Tablet 3    glimepiride (AMARYL) 4 mg tablet TAKE 1 TABLET BY MOUTH EVERY MORNING 90 Tablet 1    pravastatin (PRAVACHOL) 20 mg tablet TAKE 1 TABLET BY MOUTH EVERY NIGHT 90 Tablet 3    ALPRAZolam (XANAX) 0.25 mg tablet TAKE 1 TABLET BY MOUTH TWICE DAILY AS NEEDED FOR ANXIETY 30 Tablet 1    metFORMIN (GLUCOPHAGE) 500 mg tablet TAKE 1 TABLET BY MOUTH TWICE DAILY WITH MEALS 120 Tablet 3    gabapentin (NEURONTIN) 100 mg capsule Take 1 Capsule by mouth three (3) times daily.  Max Daily Amount: 300 mg. 90 Capsule 5    Blood-Glucose Meter monitoring kit Check fsbs bid 250.02 (Patient not taking: Reported on 7/29/2021) 1 Kit 0    glucose blood VI test strips (ASCENSIA AUTODISC VI, ONE TOUCH ULTRA TEST VI) strip Check fsbs bid 250.02 (Patient not taking: Reported on 7/29/2021) 100 Strip 5    lancets misc Check fsbs bid 250.02 (Patient not taking: Reported on 8/5/2021) 1 Each 11    OTEZLA 30 mg tab       glucose blood VI test strips (FREESTYLE TEST) strip Freestyle freedom lite test trips  -check fsbs every day  250.00 (Patient not taking: Reported on 7/29/2021) 100 Strip 11    Blood-Glucose Meter monitoring kit Use as directed (Patient not taking: Reported on 7/29/2021) 1 Kit 0     Allergies   Allergen Reactions    Pcn [Penicillins] Unknown (comments)     Given as a child and can't remember reaction      Lab Results   Component Value Date/Time    WBC 9.0 07/29/2021 01:31 PM    HGB 14.6 07/29/2021 01:31 PM    HCT 46.8 07/29/2021 01:31 PM    PLATELET 201 97/68/8669 01:31 PM    MCV 78.9 (L) 07/29/2021 01:31 PM     Lab Results   Component Value Date/Time    Hemoglobin A1c 9.0 (H) 05/21/2021 08:58 AM    Hemoglobin A1c 8.8 (H) 09/09/2020 03:54 PM    Hemoglobin A1c 8.1 (H) 02/09/2017 10:33 AM    Glucose 152 (H) 07/29/2021 01:31 PM    Glucose (POC) 132 (H) 07/30/2021 11:38 AM    Microalb/Creat ratio (ug/mg creat.) 22 09/09/2020 03:54 PM    LDL, calculated 65 07/30/2021 05:57 AM    Creatinine 0.90 07/29/2021 01:31 PM      Lab Results   Component Value Date/Time    Cholesterol, total 144 07/30/2021 05:57 AM    Cholesterol (POC) 188 02/26/2013 09:38 AM    HDL Cholesterol 42 07/30/2021 05:57 AM    LDL, calculated 65 07/30/2021 05:57 AM    LDL Cholesterol (POC) 101 02/26/2013 09:38 AM    Triglyceride 185 (H) 07/30/2021 05:57 AM    Triglycerides (POC) 184 02/26/2013 09:38 AM    CHOL/HDL Ratio 3.4 07/30/2021 05:57 AM     Lab Results   Component Value Date/Time    GFR est non-AA >60 07/29/2021 01:31 PM    GFR est AA >60 07/29/2021 01:31 PM    Creatinine 0.90 07/29/2021 01:31 PM    BUN 12 07/29/2021 01:31 PM    Sodium 138 07/29/2021 01:31 PM    Potassium 3.4 (L) 07/29/2021 01:31 PM    Chloride 99 07/29/2021 01:31 PM    CO2 30 07/29/2021 01:31 PM     Lab Results   Component Value Date/Time    Glucose 152 (H) 07/29/2021 01:31 PM    Glucose (POC) 132 (H) 07/30/2021 11:38 AM         ROS    Physical Exam  Vitals and nursing note reviewed. Constitutional:       Appearance: Normal appearance. She is well-developed. She is obese. Comments: Appears stated age   HENT:      Head: Normocephalic. Right Ear: Tympanic membrane normal.      Left Ear: Tympanic membrane normal.   Cardiovascular:      Rate and Rhythm: Normal rate and regular rhythm. Heart sounds: Normal heart sounds. No murmur heard. No friction rub. No gallop. Pulmonary:      Effort: Pulmonary effort is normal. No respiratory distress. Breath sounds: Normal breath sounds. No wheezing. Abdominal:      General: Bowel sounds are normal.      Palpations: Abdomen is soft. Musculoskeletal:      Right lower leg: No edema. Left lower leg: No edema. Neurological:      Mental Status: She is alert. ASSESSMENT and PLAN    ICD-10-CM ICD-9-CM    1. Hypertension, unspecified type  I10 401.9 CBC W/O DIFF      METABOLIC PANEL, BASIC      CBC W/O DIFF      METABOLIC PANEL, BASIC  controlled      2.  Type 2 diabetes mellitus with hyperglycemia, without long-term current use of insulin (HCC)  E11.65 250.00 MICROALBUMIN, UR, RAND W/ MICROALB/CREAT RATIO     790.29 HEMOGLOBIN A1C WITH EAG      MICROALBUMIN, UR, RAND W/ MICROALB/CREAT RATIO      HEMOGLOBIN A1C WITH EAG  Change metformin to er and plan increase to 2000 mg every day  Fsbs monitoring recommended  Declines CGM      3. Type 1 von Willebrand disease ([facility] [address]  D68.0 286.4       4. Pure hypercholesterolemia  E78.00 272.0 AST      ALT      LIPID PANEL      AST      ALT      LIPID PANEL      5. Anxiety  F41.9 300.00 Xanax bid prn      6. Primary insomnia  F51.01 307.42 suvorexant (BELSOMRA) 15 mg tablet-trial of belsomra        7.  Morbid obesity with BMI of 50.0-59.9, adult (HCC)  E66.01 278.01     Z68.43 V85.43    Rtc 4 months fu dm-2 htn hld insomnia    [T will see Dr Heriberto Ching for tinnitus

## 2022-08-30 LAB
ALBUMIN/CREAT UR: 3 MG/G CREAT (ref 0–29)
ALT SERPL-CCNC: 17 IU/L (ref 0–32)
AST SERPL-CCNC: 22 IU/L (ref 0–40)
BUN SERPL-MCNC: 14 MG/DL (ref 8–27)
BUN/CREAT SERPL: 15 (ref 12–28)
CALCIUM SERPL-MCNC: 9.8 MG/DL (ref 8.7–10.3)
CHLORIDE SERPL-SCNC: 97 MMOL/L (ref 96–106)
CHOLEST SERPL-MCNC: 171 MG/DL (ref 100–199)
CO2 SERPL-SCNC: 27 MMOL/L (ref 20–29)
CREAT SERPL-MCNC: 0.93 MG/DL (ref 0.57–1)
CREAT UR-MCNC: 129.4 MG/DL
EGFR: 70 ML/MIN/1.73
ERYTHROCYTE [DISTWIDTH] IN BLOOD BY AUTOMATED COUNT: 13.5 % (ref 11.7–15.4)
EST. AVERAGE GLUCOSE BLD GHB EST-MCNC: 197 MG/DL
GLUCOSE SERPL-MCNC: 158 MG/DL (ref 65–99)
HBA1C MFR BLD: 8.5 % (ref 4.8–5.6)
HCT VFR BLD AUTO: 42.3 % (ref 34–46.6)
HDLC SERPL-MCNC: 42 MG/DL
HGB BLD-MCNC: 13.2 G/DL (ref 11.1–15.9)
LDLC SERPL CALC-MCNC: 89 MG/DL (ref 0–99)
MCH RBC QN AUTO: 23.5 PG (ref 26.6–33)
MCHC RBC AUTO-ENTMCNC: 31.2 G/DL (ref 31.5–35.7)
MCV RBC AUTO: 75 FL (ref 79–97)
MICROALBUMIN UR-MCNC: 3.7 UG/ML
PLATELET # BLD AUTO: 382 X10E3/UL (ref 150–450)
POTASSIUM SERPL-SCNC: 4.4 MMOL/L (ref 3.5–5.2)
RBC # BLD AUTO: 5.62 X10E6/UL (ref 3.77–5.28)
SODIUM SERPL-SCNC: 141 MMOL/L (ref 134–144)
TRIGL SERPL-MCNC: 239 MG/DL (ref 0–149)
VLDLC SERPL CALC-MCNC: 40 MG/DL (ref 5–40)
WBC # BLD AUTO: 12 X10E3/UL (ref 3.4–10.8)

## 2022-10-10 ENCOUNTER — VIRTUAL VISIT (OUTPATIENT)
Dept: INTERNAL MEDICINE CLINIC | Age: 62
End: 2022-10-10
Payer: COMMERCIAL

## 2022-10-10 ENCOUNTER — TELEPHONE (OUTPATIENT)
Dept: INTERNAL MEDICINE CLINIC | Age: 62
End: 2022-10-10

## 2022-10-10 DIAGNOSIS — E11.69 TYPE 2 DIABETES MELLITUS WITH OTHER SPECIFIED COMPLICATION, WITHOUT LONG-TERM CURRENT USE OF INSULIN (HCC): ICD-10-CM

## 2022-10-10 DIAGNOSIS — E78.00 PURE HYPERCHOLESTEROLEMIA: ICD-10-CM

## 2022-10-10 DIAGNOSIS — U07.1 COVID-19: Primary | ICD-10-CM

## 2022-10-10 PROCEDURE — 99213 OFFICE O/P EST LOW 20 MIN: CPT | Performed by: INTERNAL MEDICINE

## 2022-10-10 NOTE — PROGRESS NOTES
HISTORY OF PRESENT ILLNESS  Martha Ramírez is a 58 y.o. female. HPI  Pt of Dr. Dulce Aceves, last saw him 8/29/22. Here for acute care. This is an established visit completed with telemedicine was completed with video assist. The patient acknowledges and agrees to this method of visitation doxyme. She c/o dry cough, headache, congestion x 3 days. She tested + for COVID 2 days ago. Of note, she recently traveled to the [place]. She has had 4 doses Moderna. Rx'd Paxlovid. Pt GFR > 60  Discussed possible side effects, potential for COVID rebound, quarantine and masking guidelines. Discussed Paxlovid is under EUA. Discussed going to ER for SOB. Pt states she has not been taking belsombra. Advised her to stay off it. She states she takes Xanax very infrequently. Advised her to stay off this as well. Advised pt to hold off on pravastatin for 8 days. Patient Active Problem List    Diagnosis Date Noted    Anxiety 10/14/2021    Abnormal EKG 07/30/2021    Chest pain 07/29/2021    Obesity, morbid ([address] 11/09/2018    Type 2 diabetes mellitus without complication ([address] 18/03/1419    Hepatic steatosis 04/22/2015    HLD (hyperlipidemia) 06/28/2014    Goiter 06/27/2013    Diabetes mellitus ([address] 11/03/2011    Type 1 von Willebrand disease 02/12/2010    Essential hypertension, benign 02/12/2010    Obesity 02/12/2010    PEGGY (obstructive sleep apnea) 02/12/2010    GERD (gastroesophageal reflux disease) 02/12/2010    Venous insufficiency 02/12/2010    Nephrolithiasis 02/12/2010    Allergic rhinitis 02/12/2010    Asthma 02/12/2010    Carpal tunnel syndrome 02/12/2010    Basal cell carcinoma of nose 02/12/2010    Migraine 02/12/2010     Current Outpatient Medications   Medication Sig Dispense Refill    omeprazole (PRILOSEC) 20 mg capsule TAKE 1 CAPSULE BY MOUTH EVERY DAY BEFORE BREAKFAST      metFORMIN ER (GLUCOPHAGE XR) 500 mg tablet Take 4 Tablets by mouth daily (with dinner).  360 Tablet 3    losartan (COZAAR) 50 mg tablet Take 1 Tablet by mouth in the morning. 90 Tablet 3    hydroCHLOROthiazide (HYDRODIURIL) 12.5 mg tablet Take 1 Tablet by mouth in the morning. 90 Tablet 3    glimepiride (AMARYL) 4 mg tablet TAKE 1 TABLET BY MOUTH EVERY MORNING 90 Tablet 1    pravastatin (PRAVACHOL) 20 mg tablet TAKE 1 TABLET BY MOUTH EVERY NIGHT 90 Tablet 3    ALPRAZolam (XANAX) 0.25 mg tablet TAKE 1 TABLET BY MOUTH TWICE DAILY AS NEEDED FOR ANXIETY 30 Tablet 1    OTEZLA 30 mg tab two (2) times a day. suvorexant (BELSOMRA) 15 mg tablet Take 1 Tablet by mouth nightly as needed for Insomnia. Max Daily Amount: 15 mg. (Patient not taking: Reported on 10/10/2022) 30 Tablet 5    Blood-Glucose Meter monitoring kit Use as directed (Patient not taking: No sig reported) 1 Kit 0     Past Surgical History:   Procedure Laterality Date    HX CARPAL TUNNEL RELEASE      HX HYSTERECTOMY  2002? HX ORTHOPAEDIC  2008, 2009    carpal tunnel    HX ORTHOPAEDIC  2010    torn meniscus    HX THYROIDECTOMY  01/2013    right side    LA BREAST SURGERY PROCEDURE UNLISTED  1984    breast reduction    LA CHEST SURGERY PROCEDURE UNLISTED  1/29/13     THYROIDECTOMY Retrosternal and Retromandibular!     LA MOHS SURG, ADDL BLOCK      basal cell on nose      Lab Results   Component Value Date/Time    WBC 12.0 (H) 08/29/2022 12:00 AM    HGB 13.2 08/29/2022 12:00 AM    HCT 42.3 08/29/2022 12:00 AM    PLATELET 059 98/13/5749 12:00 AM    MCV 75 (L) 08/29/2022 12:00 AM     Lab Results   Component Value Date/Time    Cholesterol, total 171 08/29/2022 12:00 AM    Cholesterol (POC) 188 02/26/2013 09:38 AM    HDL Cholesterol 42 08/29/2022 12:00 AM    LDL, calculated 89 08/29/2022 12:00 AM    LDL, calculated 65 07/30/2021 05:57 AM    LDL Cholesterol (POC) 101 02/26/2013 09:38 AM    Triglyceride 239 (H) 08/29/2022 12:00 AM    Triglycerides (POC) 184 02/26/2013 09:38 AM    CHOL/HDL Ratio 3.4 07/30/2021 05:57 AM     Lab Results   Component Value Date/Time    GFR est non-AA >60 07/29/2021 01:31 PM    GFR est AA >60 07/29/2021 01:31 PM    Creatinine 0.93 08/29/2022 12:00 AM    BUN 14 08/29/2022 12:00 AM    Sodium 141 08/29/2022 12:00 AM    Potassium 4.4 08/29/2022 12:00 AM    Chloride 97 08/29/2022 12:00 AM    CO2 27 08/29/2022 12:00 AM        Review of Systems   HENT:  Positive for congestion. Respiratory:  Positive for cough. Negative for shortness of breath. Cardiovascular:  Negative for chest pain. Neurological:  Positive for headaches. Physical Exam  Constitutional:       General: She is not in acute distress. Appearance: She is not ill-appearing, toxic-appearing or diaphoretic. HENT:      Head: Normocephalic and atraumatic. Right Ear: External ear normal.      Left Ear: External ear normal.   Eyes:      General:         Right eye: No discharge. Left eye: No discharge. Conjunctiva/sclera: Conjunctivae normal.      Pupils: Pupils are equal, round, and reactive to light. Cardiovascular:      Rate and Rhythm: Normal rate and regular rhythm. Heart sounds: No murmur heard. No friction rub. No gallop. Pulmonary:      Effort: No respiratory distress. Breath sounds: Normal breath sounds. No wheezing or rales. Chest:      Chest wall: No tenderness. Musculoskeletal:         General: Normal range of motion. Cervical back: Normal.   Skin:     General: Skin is warm and dry. Neurological:      Mental Status: She is oriented to person, place, and time. Mental status is at baseline.       Coordination: Coordination normal.      Gait: Gait normal.   Psychiatric:         Mood and Affect: Mood normal.         Behavior: Behavior normal.       ASSESSMENT and PLAN    ICD-10-CM ICD-9-CM    1. COVID-19  U07.1 079.89    Patient tested positive for COVID she is within 5 days of symptoms    She qualifies for treatment with paxlovid due to multiple medical problems including obesity diabetes hyperlipidemia hypertension    Discussed this with her    She has had 4 of her COVID vaccines    Because of her medical problems she is high risk for decompensation with COVID    We will go ahead and start the above medication which is under EUA    Reviewed drug interactions with her    The only interactions would be with her Belsomra which she actually never even started she does not have this medication    Or with her Xanax which she infrequently uses has not taken in quite some time    Also with Pravachol which she will hold for 8 days    Discussed quarantine guidelines    Discussed COVID rebound    Discussed reasons to seek additional treatment or help    Currently breathing is stable no shortness of breath minimal coughing discussed over-the-counter medications as well    Reviewed labs GFR 70 normal kidney function   2. Type 2 diabetes mellitus with other specified complication, without long-term current use of insulin (HCC)  E11.69 250.80    Increased risk of decompensation from COVID   3. Pure hypercholesterolemia  E78.00 272.0    Will hold Pravachol for 8 days     Depression screen reviewed and negative. Scribed by Lizbeth Brooks, as dictated by Dr. Diana Kay. Current diagnosis and concerns discussed with pt at length. Pt understands risks and benefits or current treatment plan and medications, and accepts the treatment and medication with any possible risks. Pt asks appropriate questions, which were answered. Pt was instructed to call with any concerns or problems. I have reviewed the note documented by the scribe. The services provided are my own. The documentation is accurate.

## 2022-10-10 NOTE — TELEPHONE ENCOUNTER
Pt states that she tested positive for COVID on 10-8-22 with symptoms of cough, headache and upper respiratory problems. Pt is asking if Dr. Zeenat Moreno would like her to have medication for this? Please call pt to let her know what doctor wants to do. Thanks.

## 2022-11-30 RX ORDER — GLIMEPIRIDE 4 MG/1
4 TABLET ORAL
Qty: 90 TABLET | Refills: 1 | Status: SHIPPED | OUTPATIENT
Start: 2022-11-30

## 2022-11-30 NOTE — TELEPHONE ENCOUNTER
Future Appointments:  Future Appointments   Date Time Provider Bhavesh Downsi   1/4/2023 10:15 AM Shari Ruiz MD [facility] BS AMB        Last Appointment With Me:  8/29/2022     Requested Prescriptions     Pending Prescriptions Disp Refills    glimepiride (AMARYL) 4 mg tablet 90 Tablet 1     Sig: Take 1 Tablet by mouth every morning.

## 2022-12-02 ENCOUNTER — TELEPHONE (OUTPATIENT)
Dept: INTERNAL MEDICINE CLINIC | Age: 62
End: 2022-12-02

## 2022-12-02 NOTE — TELEPHONE ENCOUNTER
Left message to schedule a new patient appt with   Received ok from both providers for provider switch

## 2022-12-08 NOTE — TELEPHONE ENCOUNTER
2nd attempt- Left message to schedule a new patient appt with   Received ok from both providers for provider switch

## 2023-01-19 ENCOUNTER — OFFICE VISIT (OUTPATIENT)
Dept: INTERNAL MEDICINE CLINIC | Age: 63
End: 2023-01-19
Payer: COMMERCIAL

## 2023-01-19 VITALS
DIASTOLIC BLOOD PRESSURE: 77 MMHG | HEIGHT: 61 IN | OXYGEN SATURATION: 95 % | BODY MASS INDEX: 48.9 KG/M2 | SYSTOLIC BLOOD PRESSURE: 124 MMHG | RESPIRATION RATE: 16 BRPM | HEART RATE: 102 BPM | TEMPERATURE: 98.9 F | WEIGHT: 259 LBS

## 2023-01-19 DIAGNOSIS — E78.00 PURE HYPERCHOLESTEROLEMIA: ICD-10-CM

## 2023-01-19 DIAGNOSIS — F51.01 PRIMARY INSOMNIA: ICD-10-CM

## 2023-01-19 DIAGNOSIS — Z23 ENCOUNTER FOR IMMUNIZATION: ICD-10-CM

## 2023-01-19 DIAGNOSIS — I10 ESSENTIAL HYPERTENSION, BENIGN: ICD-10-CM

## 2023-01-19 DIAGNOSIS — D68.00 VON WILLEBRAND DISEASE: ICD-10-CM

## 2023-01-19 DIAGNOSIS — E11.69 TYPE 2 DIABETES MELLITUS WITH OTHER SPECIFIED COMPLICATION, WITHOUT LONG-TERM CURRENT USE OF INSULIN (HCC): Primary | ICD-10-CM

## 2023-01-19 DIAGNOSIS — E66.01 MORBID OBESITY WITH BMI OF 50.0-59.9, ADULT (HCC): ICD-10-CM

## 2023-01-19 DIAGNOSIS — E66.01 OBESITY, MORBID (HCC): ICD-10-CM

## 2023-01-19 LAB — HBA1C MFR BLD HPLC: 7.5 % (ref 4.8–5.6)

## 2023-01-19 RX ORDER — ALPRAZOLAM 0.25 MG/1
TABLET ORAL
Qty: 30 TABLET | Refills: 2 | Status: SHIPPED | OUTPATIENT
Start: 2023-01-19

## 2023-01-19 NOTE — PROGRESS NOTES
1. \"Have you been to the ER, urgent care clinic since your last visit? Hospitalized since your last visit? \" No    2. \"Have you seen or consulted any other health care providers outside of the [address] [place] [address] since your last visit? \" Dr. Joanne Beal, Dr Jean Egan Dermatologist      3. For patients aged 39-70: Has the patient had a colonoscopy / FIT/ Cologuard? Yes - Care Gap present. Most recent result on file      If the patient is female:    4. For patients aged 41-77: Has the patient had a mammogram within the past 2 years? Yes - no Care Gap present  1/16/2023      5. For patients aged 21-65: Has the patient had a pap smear?  No

## 2023-01-19 NOTE — PROGRESS NOTES
Mustapha Wu is a 58 y.o. female who presents to [facility], has been followed by Dr Trisha Schroeder. Treated for diabetes, on metformin ER 500mg 4 a day at dinner, amaryl 4mg daily at breakfast. Has had hypoglycemia. Prior ozempic. HbA1C 8.5% September 2022. .  prior DM education. Trying to follow diabetic diet. Weight loss 6#, walking more- foot pain, saw podiatry. On statin for HLP. , LDL 89. Treated for HTN, on losartan 50mg daily, HCTZ 12.5mg daily. BP controlled. Taking xanax 0.25mg at bedtime, awakenings. Has Kyung Zachariah. No bleeding. Had Covid in October 2022. PEGGY, does not use CPAP. It was mild. Mammogram [facility]. Done yesterday. Sees derm, prior BCC. Dr Yamileth Hansen. Prior colonoscopy, polyps. Every 2 years. Dr Fadumo Gould. Prior EGD, gastric ulcer. On omeprazole 20mg . Feeling nauseated and has belching. Past Medical History:   Diagnosis Date    Achilles tendinitis     Arrhythmia     heart murmur    Chronic pain     Contact dermatitis and other eczema, due to unspecified cause     Diabetes (HCC)     GERD (gastroesophageal reflux disease)     Heel spur, right     Hepatic steatosis     Hypertension     Joint pain     & muscle aches    Migraine     Nephrolithiasis     Obesity     Plantar fasciitis of right foot     Sleep apnea     zapata not use c-pap    Von Willebrand disease        Family History   Problem Relation Age of Onset    Hypertension Father     Stroke Father        Social History     Socioeconomic History    Marital status:      Spouse name: Not on file    Number of children: Not on file    Years of education: Not on file    Highest education level: Not on file   Occupational History    Not on file   Tobacco Use    Smoking status: Never    Smokeless tobacco: Never   Vaping Use    Vaping Use: Never used   Substance and Sexual Activity    Alcohol use:  Yes     Alcohol/week: 1.7 standard drinks     Types: 1 Glasses of wine, 1 Cans of beer per week     Comment: rarely, 3x a month    Drug use: Never    Sexual activity: Yes     Partners: Female   Other Topics Concern    Not on file   Social History Narrative    Not on file     Social Determinants of Health     Financial Resource Strain: Low Risk     Difficulty of Paying Living Expenses: Not hard at all   Food Insecurity: No Food Insecurity    Worried About Running Out of Food in the Last Year: Never true    Ran Out of Food in the Last Year: Never true   Transportation Needs: Not on file   Physical Activity: Not on file   Stress: Not on file   Social Connections: Not on file   Intimate Partner Violence: Not on file   Housing Stability: Not on file       Current Outpatient Medications on File Prior to Visit   Medication Sig Dispense Refill    glimepiride (AMARYL) 4 mg tablet Take 1 Tablet by mouth every morning. 90 Tablet 1    ALPRAZolam (XANAX) 0.25 mg tablet TAKE 1 TABLET BY MOUTH TWICE DAILY AS NEEDED FOR ANXIETY 30 Tablet 2    [DISCONTINUED] nirmatrelvir-ritonavir (Paxlovid, EUA,) 300 mg (150 mg x 2)-100 mg Gfr 70 3 tabs bid for 5 day 1 Box 0    omeprazole (PRILOSEC) 20 mg capsule TAKE 1 CAPSULE BY MOUTH EVERY DAY BEFORE BREAKFAST      metFORMIN ER (GLUCOPHAGE XR) 500 mg tablet Take 4 Tablets by mouth daily (with dinner). 360 Tablet 3    losartan (COZAAR) 50 mg tablet Take 1 Tablet by mouth in the morning. 90 Tablet 3    hydroCHLOROthiazide (HYDRODIURIL) 12.5 mg tablet Take 1 Tablet by mouth in the morning. 90 Tablet 3    pravastatin (PRAVACHOL) 20 mg tablet TAKE 1 TABLET BY MOUTH EVERY NIGHT 90 Tablet 3    OTEZLA 30 mg tab two (2) times a day. Blood-Glucose Meter monitoring kit Use as directed (Patient not taking: No sig reported) 1 Kit 0     No current facility-administered medications on file prior to visit. Review of Systems  Pertinent items are noted in HPI.     Objective:     Visit Vitals  /77   Pulse (!) 102   Temp 98.9 °F (37.2 °C) (Temporal)   Resp 16   Ht 5' 1\" (1.549 m)   Wt 259 lb (117.5 kg)   SpO2 95%   BMI 48.94 kg/m²     Gen: well appearing female  HEENT:   PERRL,normal conjunctiva. External ear and canals normal, TMs no opacification or erythema,  OP no erythema, no exudates, MMM  Neck:  Supple. Thyroid normal size, nontender, without nodules. No masses or LAD  Resp:  No wheezing, no rhonchi, no rales. CV:  RRR, normal S1S2, no murmur. GI: soft, nontender, without masses. No hepatosplenomegaly. Extrem:  +2 pulses, no edema, warm distally      Assessment/Plan:       ICD-10-CM ICD-9-CM    1. Type 2 diabetes mellitus with other specified complication, without long-term current use of insulin ([facility])  E11.69 250.80 AMB POC HEMOGLOBIN A1C      2. Obesity, morbid ([facility] [address]  E66.01 278.01       3. Morbid obesity with BMI of 50.0-59.9, adult ([facility])  E66.01 278.01     Z68.43 V85.43       4. Pure hypercholesterolemia  E78.00 272.0       5. Essential hypertension, benign  I10 401.1       6. Von Willebrand disease  D68.00 286.4         Info on GERD. Continue prilosec, add pepcid complete. Continue diabetic diet. Change to metformin XR 500mg 2 twice a day. Instead of 4 at once.         Stef Rahman MD

## 2023-02-13 RX ORDER — PRAVASTATIN SODIUM 20 MG/1
20 TABLET ORAL
Qty: 90 TABLET | Refills: 3 | Status: SHIPPED | OUTPATIENT
Start: 2023-02-13

## 2023-04-17 ENCOUNTER — OFFICE VISIT (OUTPATIENT)
Dept: INTERNAL MEDICINE CLINIC | Age: 63
End: 2023-04-17
Payer: COMMERCIAL

## 2023-04-17 VITALS
HEIGHT: 61 IN | HEART RATE: 92 BPM | OXYGEN SATURATION: 94 % | BODY MASS INDEX: 47.2 KG/M2 | WEIGHT: 250 LBS | TEMPERATURE: 96.6 F | DIASTOLIC BLOOD PRESSURE: 68 MMHG | RESPIRATION RATE: 16 BRPM | SYSTOLIC BLOOD PRESSURE: 110 MMHG

## 2023-04-17 DIAGNOSIS — E66.01 OBESITY, MORBID (HCC): ICD-10-CM

## 2023-04-17 DIAGNOSIS — G47.33 OSA (OBSTRUCTIVE SLEEP APNEA): ICD-10-CM

## 2023-04-17 DIAGNOSIS — Z00.00 ROUTINE MEDICAL EXAM: Primary | ICD-10-CM

## 2023-04-17 DIAGNOSIS — I10 ESSENTIAL HYPERTENSION, BENIGN: ICD-10-CM

## 2023-04-17 DIAGNOSIS — E55.9 VITAMIN D DEFICIENCY: ICD-10-CM

## 2023-04-17 DIAGNOSIS — E11.69 TYPE 2 DIABETES MELLITUS WITH OTHER SPECIFIED COMPLICATION, WITHOUT LONG-TERM CURRENT USE OF INSULIN (HCC): ICD-10-CM

## 2023-04-17 PROCEDURE — 99396 PREV VISIT EST AGE 40-64: CPT | Performed by: FAMILY MEDICINE

## 2023-04-17 NOTE — PROGRESS NOTES
1. \"Have you been to the ER, urgent care clinic since your last visit? Hospitalized since your last visit? \" No    2. \"Have you seen or consulted any other health care providers outside of the [address] [place] [address] since your last visit? \" Yes Dermatologist       3. For patients aged 39-70: Has the patient had a colonoscopy / FIT/ Cologuard? Yes - Care Gap present. Most recent result on file      If the patient is female:    4. For patients aged 41-77: Has the patient had a mammogram within the past 2 years? Yes - Care Gap present. Most recent result on file      5. For patients aged 21-65: Has the patient had a pap smear?  No

## 2023-04-18 LAB
25(OH)D3+25(OH)D2 SERPL-MCNC: 23.2 NG/ML (ref 30–100)
ALBUMIN SERPL-MCNC: 4.2 G/DL (ref 3.8–4.8)
ALBUMIN/CREAT UR: 4 MG/G CREAT (ref 0–29)
ALBUMIN/GLOB SERPL: 1.6 {RATIO} (ref 1.2–2.2)
ALP SERPL-CCNC: 103 IU/L (ref 44–121)
ALT SERPL-CCNC: 19 IU/L (ref 0–32)
APPEARANCE UR: ABNORMAL
AST SERPL-CCNC: 24 IU/L (ref 0–40)
BACTERIA #/AREA URNS HPF: ABNORMAL /[HPF]
BILIRUB SERPL-MCNC: 0.2 MG/DL (ref 0–1.2)
BILIRUB UR QL STRIP: NEGATIVE
BUN SERPL-MCNC: 12 MG/DL (ref 8–27)
BUN/CREAT SERPL: 16 (ref 12–28)
CALCIUM SERPL-MCNC: 9.6 MG/DL (ref 8.7–10.3)
CASTS URNS QL MICRO: ABNORMAL /LPF
CHLORIDE SERPL-SCNC: 97 MMOL/L (ref 96–106)
CHOLEST SERPL-MCNC: 132 MG/DL (ref 100–199)
CO2 SERPL-SCNC: 27 MMOL/L (ref 20–29)
COLOR UR: YELLOW
CREAT SERPL-MCNC: 0.77 MG/DL (ref 0.57–1)
CREAT UR-MCNC: 118.8 MG/DL
EGFRCR SERPLBLD CKD-EPI 2021: 87 ML/MIN/1.73
EPI CELLS #/AREA URNS HPF: ABNORMAL /HPF (ref 0–10)
ERYTHROCYTE [DISTWIDTH] IN BLOOD BY AUTOMATED COUNT: 14.2 % (ref 11.7–15.4)
EST. AVERAGE GLUCOSE BLD GHB EST-MCNC: 160 MG/DL
GLOBULIN SER CALC-MCNC: 2.7 G/DL (ref 1.5–4.5)
GLUCOSE SERPL-MCNC: 146 MG/DL (ref 70–99)
GLUCOSE UR QL STRIP: NEGATIVE
HBA1C MFR BLD: 7.2 % (ref 4.8–5.6)
HCT VFR BLD AUTO: 40.5 % (ref 34–46.6)
HDLC SERPL-MCNC: 41 MG/DL
HGB BLD-MCNC: 12.7 G/DL (ref 11.1–15.9)
HGB UR QL STRIP: NEGATIVE
KETONES UR QL STRIP: NEGATIVE
LDLC SERPL CALC-MCNC: 61 MG/DL (ref 0–99)
LEUKOCYTE ESTERASE UR QL STRIP: ABNORMAL
MCH RBC QN AUTO: 23.5 PG (ref 26.6–33)
MCHC RBC AUTO-ENTMCNC: 31.4 G/DL (ref 31.5–35.7)
MCV RBC AUTO: 75 FL (ref 79–97)
MICRO URNS: ABNORMAL
MICROALBUMIN UR-MCNC: 4.2 UG/ML
NITRITE UR QL STRIP: NEGATIVE
PH UR STRIP: 5 [PH] (ref 5–7.5)
PLATELET # BLD AUTO: 357 X10E3/UL (ref 150–450)
POTASSIUM SERPL-SCNC: 4.6 MMOL/L (ref 3.5–5.2)
PROT SERPL-MCNC: 6.9 G/DL (ref 6–8.5)
PROT UR QL STRIP: NEGATIVE
RBC # BLD AUTO: 5.41 X10E6/UL (ref 3.77–5.28)
RBC #/AREA URNS HPF: ABNORMAL /HPF (ref 0–2)
SODIUM SERPL-SCNC: 141 MMOL/L (ref 134–144)
SP GR UR STRIP: 1.02 (ref 1–1.03)
TRIGL SERPL-MCNC: 178 MG/DL (ref 0–149)
TSH SERPL DL<=0.005 MIU/L-ACNC: 1.23 UIU/ML (ref 0.45–4.5)
UROBILINOGEN UR STRIP-MCNC: 0.2 MG/DL (ref 0.2–1)
VLDLC SERPL CALC-MCNC: 30 MG/DL (ref 5–40)
WBC # BLD AUTO: 7.8 X10E3/UL (ref 3.4–10.8)
WBC #/AREA URNS HPF: ABNORMAL /HPF (ref 0–5)

## 2023-04-30 ENCOUNTER — PATIENT MESSAGE (OUTPATIENT)
Dept: INTERNAL MEDICINE CLINIC | Age: 63
End: 2023-04-30

## 2023-05-06 SDOH — ECONOMIC STABILITY: FOOD INSECURITY: WITHIN THE PAST 12 MONTHS, YOU WORRIED THAT YOUR FOOD WOULD RUN OUT BEFORE YOU GOT MONEY TO BUY MORE.: NEVER TRUE

## 2023-05-06 SDOH — ECONOMIC STABILITY: TRANSPORTATION INSECURITY
IN THE PAST 12 MONTHS, HAS LACK OF TRANSPORTATION KEPT YOU FROM MEETINGS, WORK, OR FROM GETTING THINGS NEEDED FOR DAILY LIVING?: NO

## 2023-05-06 SDOH — ECONOMIC STABILITY: INCOME INSECURITY: HOW HARD IS IT FOR YOU TO PAY FOR THE VERY BASICS LIKE FOOD, HOUSING, MEDICAL CARE, AND HEATING?: NOT HARD AT ALL

## 2023-05-06 SDOH — ECONOMIC STABILITY: HOUSING INSECURITY
IN THE LAST 12 MONTHS, WAS THERE A TIME WHEN YOU DID NOT HAVE A STEADY PLACE TO SLEEP OR SLEPT IN A SHELTER (INCLUDING NOW)?: NO

## 2023-05-06 SDOH — ECONOMIC STABILITY: FOOD INSECURITY: WITHIN THE PAST 12 MONTHS, THE FOOD YOU BOUGHT JUST DIDN'T LAST AND YOU DIDN'T HAVE MONEY TO GET MORE.: NEVER TRUE

## 2023-05-09 ENCOUNTER — TELEMEDICINE (OUTPATIENT)
Age: 63
End: 2023-05-09
Payer: COMMERCIAL

## 2023-05-09 DIAGNOSIS — F41.9 ANXIETY: Primary | ICD-10-CM

## 2023-05-09 PROCEDURE — 99213 OFFICE O/P EST LOW 20 MIN: CPT | Performed by: FAMILY MEDICINE

## 2023-05-09 SDOH — ECONOMIC STABILITY: FOOD INSECURITY: WITHIN THE PAST 12 MONTHS, YOU WORRIED THAT YOUR FOOD WOULD RUN OUT BEFORE YOU GOT MONEY TO BUY MORE.: NEVER TRUE

## 2023-05-09 SDOH — ECONOMIC STABILITY: INCOME INSECURITY: HOW HARD IS IT FOR YOU TO PAY FOR THE VERY BASICS LIKE FOOD, HOUSING, MEDICAL CARE, AND HEATING?: NOT HARD AT ALL

## 2023-05-09 SDOH — ECONOMIC STABILITY: FOOD INSECURITY: WITHIN THE PAST 12 MONTHS, THE FOOD YOU BOUGHT JUST DIDN'T LAST AND YOU DIDN'T HAVE MONEY TO GET MORE.: NEVER TRUE

## 2023-05-09 ASSESSMENT — PATIENT HEALTH QUESTIONNAIRE - PHQ9
1. LITTLE INTEREST OR PLEASURE IN DOING THINGS: 0
SUM OF ALL RESPONSES TO PHQ QUESTIONS 1-9: 0
SUM OF ALL RESPONSES TO PHQ9 QUESTIONS 1 & 2: 0
SUM OF ALL RESPONSES TO PHQ QUESTIONS 1-9: 0
SUM OF ALL RESPONSES TO PHQ QUESTIONS 1-9: 0
2. FEELING DOWN, DEPRESSED OR HOPELESS: 0
SUM OF ALL RESPONSES TO PHQ QUESTIONS 1-9: 0

## 2023-05-09 ASSESSMENT — ANXIETY QUESTIONNAIRES
6. BECOMING EASILY ANNOYED OR IRRITABLE: 0
3. WORRYING TOO MUCH ABOUT DIFFERENT THINGS: 0
4. TROUBLE RELAXING: 0
5. BEING SO RESTLESS THAT IT IS HARD TO SIT STILL: 0
7. FEELING AFRAID AS IF SOMETHING AWFUL MIGHT HAPPEN: 0
GAD7 TOTAL SCORE: 2
2. NOT BEING ABLE TO STOP OR CONTROL WORRYING: 1
1. FEELING NERVOUS, ANXIOUS, OR ON EDGE: 1

## 2023-05-09 NOTE — PROGRESS NOTES
1. \"Have you been to the ER, urgent care clinic since your last visit? Hospitalized since your last visit? \" No    2. \"Have you seen or consulted any other health care providers outside of the [address] [place] [address] since your last visit? \" No     3. For patients aged 39-70: Has the patient had a colonoscopy / FIT/ Cologuard? Yes      If the patient is female:    4. For patients aged 41-77: Has the patient had a mammogram within the past 2 years? Yes      5. For patients aged 21-65: Has the patient had a pap smear?  Yes

## 2023-05-09 NOTE — PROGRESS NOTES
Raman Garzon is a 58 y.o. female who presents with concern of anxiety    Last seen April 17. Has had weird feeling in stomach and felt it was anxiety. Has taken xanax 0.25mg BID for 4 days. Usually at bedtime as needed. Reports family stress. Has tried to spend time with friends. Is going to [place], meeting brother. This is an established visit conducted via telemedicine with video. The patient has been instructed that this meets HIPAA criteria and acknowledges and agrees to this method of visitation. Pursuant to the emergency declaration under the [facility], [facility] waiver authority and the [employer] Act, this Virtual Visit was conducted, with patient's consent, to reduce the patient's risk of exposure to COVID-19 and provide continuity of care for an established patient. Services were provided through a video synchronous discussion virtually to substitute for in-person clinic visit. Past Medical History:   Diagnosis Date    Achilles tendinitis     Arrhythmia     heart murmur    Chronic pain     Contact dermatitis and other eczema, due to unspecified cause     Diabetes (HCC)     GERD (gastroesophageal reflux disease)     Heel spur, right     Hepatic steatosis     Hypertension     Joint pain     & muscle aches    Migraine     Nephrolithiasis     Obesity     Plantar fasciitis of right foot     Sleep apnea     appiah not use c-pap    Von Willebrand disease ([facility] [address]      Family History   Problem Relation Age of Onset    Hypertension Father     Stroke Father         Social History     Socioeconomic History    Marital status:      Spouse name: None    Number of children: None    Years of education: None    Highest education level: None   Tobacco Use    Smoking status: Never    Smokeless tobacco: Never   Substance and Sexual Activity    Alcohol use:  Yes     Alcohol/week: 1.7 standard drinks    Drug use:

## 2023-05-16 RX ORDER — GLIMEPIRIDE 4 MG/1
4 TABLET ORAL EVERY MORNING
Qty: 90 TABLET | Refills: 0 | Status: SHIPPED | OUTPATIENT
Start: 2023-05-16

## 2023-05-16 NOTE — TELEPHONE ENCOUNTER
PCP: Min Magana MD    Last appt  5/9/2023   No future appointments.     Requested Prescriptions     Pending Prescriptions Disp Refills    glimepiride (AMARYL) 4 MG tablet 90 tablet 0     Sig: Take 1 tablet by mouth every morning

## 2023-08-18 RX ORDER — METFORMIN HYDROCHLORIDE 500 MG/1
1000 TABLET, EXTENDED RELEASE ORAL 2 TIMES DAILY
Qty: 120 TABLET | Refills: 5 | Status: SHIPPED | OUTPATIENT
Start: 2023-08-18

## 2023-08-18 RX ORDER — HYDROCHLOROTHIAZIDE 12.5 MG/1
12.5 TABLET ORAL DAILY
Qty: 30 TABLET | Refills: 5 | Status: SHIPPED | OUTPATIENT
Start: 2023-08-18

## 2023-08-18 RX ORDER — LOSARTAN POTASSIUM 50 MG/1
50 TABLET ORAL DAILY
Qty: 30 TABLET | Refills: 5 | Status: SHIPPED | OUTPATIENT
Start: 2023-08-18

## 2023-08-18 NOTE — TELEPHONE ENCOUNTER
PCP: Chepe Perez MD    Last appt  5/9/2023   Future Appointments   Date Time Provider [address]   10/9/2023 10:15 AM Diann Candelario MD [facility]       Requested Prescriptions     Pending Prescriptions Disp Refills    hydroCHLOROthiazide (HYDRODIURIL) 12.5 MG tablet 30 tablet      Sig: Take 1 tablet by mouth daily    losartan (COZAAR) 50 MG tablet 30 tablet      Sig: Take 1 tablet by mouth daily    metFORMIN (GLUCOPHAGE-XR) 500 MG extended release tablet 30 tablet      Sig: Take 4 tablets by mouth Daily with supper

## 2023-08-18 NOTE — TELEPHONE ENCOUNTER
metFORMIN (GLUCOPHAGE-XR) 500 MG extended release tablet    hydroCHLOROthiazide (HYDRODIURIL) 12.5 MG tablet    losartan (COZAAR) 50 MG tablet    [facility] #[id number]

## 2023-10-08 NOTE — PROGRESS NOTES
Benny Donna is a 61 y.o. female who presents for follow up. Reports 2 weeks of axillary bump,  it was draining, is red and tender, using warm compresses. Treated for diabetes, on metformin ER 500mg 2 twice a day,  self stopped amaryl. Did not try ozempic. HbA1C 7.2% April 2023. weight loss 264# Oct 2021,  250# April 2023, today 244#. prior DM education. Today HbA1C  7.5%. Up to date on eye exam     Treated for HLP. On pravastatin. LDL 61. No myalgias. Treated for HTN, on losartan 50mg daily, HCTZ 12.5mg daily. BP controlled. Treated for anxiety, taking xanax prn. Had steroid shot in left knee last week, helpful. [facility] On otezla. Past Medical History:   Diagnosis Date    Achilles tendinitis     Arrhythmia     heart murmur    Chronic pain     Contact dermatitis and other eczema, due to unspecified cause     Diabetes (HCC)     GERD (gastroesophageal reflux disease)     Heel spur, right     Hepatic steatosis     Hypertension     Joint pain     & muscle aches    Migraine     Nephrolithiasis     Obesity     Plantar fasciitis of right foot     Sleep apnea     appiah not use c-pap    Von Willebrand disease ([address])        Family History   Problem Relation Age of Onset    Hypertension Father     Stroke Father         Social History     Socioeconomic History    Marital status:      Spouse name: Not on file    Number of children: Not on file    Years of education: Not on file    Highest education level: Not on file   Occupational History    Not on file   Tobacco Use    Smoking status: Never    Smokeless tobacco: Never   Substance and Sexual Activity    Alcohol use:  Yes     Alcohol/week: 1.7 standard drinks of alcohol    Drug use: Never    Sexual activity: Not on file   Other Topics Concern    Not on file   Social History Narrative    Not on file     Social Determinants of Health     Financial Resource Strain: Low Risk  (10/9/2023)    Overall Financial Resource

## 2023-10-09 ENCOUNTER — OFFICE VISIT (OUTPATIENT)
Age: 63
End: 2023-10-09
Payer: COMMERCIAL

## 2023-10-09 VITALS
BODY MASS INDEX: 46.07 KG/M2 | DIASTOLIC BLOOD PRESSURE: 71 MMHG | HEART RATE: 93 BPM | SYSTOLIC BLOOD PRESSURE: 122 MMHG | RESPIRATION RATE: 18 BRPM | TEMPERATURE: 98 F | WEIGHT: 244 LBS | HEIGHT: 61 IN | OXYGEN SATURATION: 93 %

## 2023-10-09 DIAGNOSIS — I10 ESSENTIAL HYPERTENSION, BENIGN: ICD-10-CM

## 2023-10-09 DIAGNOSIS — E78.00 PURE HYPERCHOLESTEROLEMIA: ICD-10-CM

## 2023-10-09 DIAGNOSIS — F41.9 ANXIETY: ICD-10-CM

## 2023-10-09 DIAGNOSIS — E66.01 OBESITY, MORBID (HCC): ICD-10-CM

## 2023-10-09 DIAGNOSIS — E11.69 TYPE 2 DIABETES MELLITUS WITH OTHER SPECIFIED COMPLICATION, WITHOUT LONG-TERM CURRENT USE OF INSULIN (HCC): Primary | ICD-10-CM

## 2023-10-09 DIAGNOSIS — L02.411 CUTANEOUS ABSCESS OF RIGHT AXILLA: ICD-10-CM

## 2023-10-09 LAB — HBA1C MFR BLD: 7.5 %

## 2023-10-09 PROCEDURE — 99214 OFFICE O/P EST MOD 30 MIN: CPT | Performed by: FAMILY MEDICINE

## 2023-10-09 PROCEDURE — 3051F HG A1C>EQUAL 7.0%<8.0%: CPT | Performed by: FAMILY MEDICINE

## 2023-10-09 PROCEDURE — 83036 HEMOGLOBIN GLYCOSYLATED A1C: CPT | Performed by: FAMILY MEDICINE

## 2023-10-09 PROCEDURE — 3078F DIAST BP <80 MM HG: CPT | Performed by: FAMILY MEDICINE

## 2023-10-09 PROCEDURE — 3074F SYST BP LT 130 MM HG: CPT | Performed by: FAMILY MEDICINE

## 2023-10-09 PROCEDURE — 90471 IMMUNIZATION ADMIN: CPT | Performed by: FAMILY MEDICINE

## 2023-10-09 PROCEDURE — 90674 CCIIV4 VAC NO PRSV 0.5 ML IM: CPT | Performed by: FAMILY MEDICINE

## 2023-10-09 RX ORDER — LOSARTAN POTASSIUM 50 MG/1
50 TABLET ORAL DAILY
Qty: 90 TABLET | Refills: 3 | Status: SHIPPED | OUTPATIENT
Start: 2023-10-09

## 2023-10-09 RX ORDER — HYDROCHLOROTHIAZIDE 12.5 MG/1
12.5 TABLET ORAL DAILY
Qty: 90 TABLET | Refills: 3 | Status: SHIPPED | OUTPATIENT
Start: 2023-10-09

## 2023-10-09 RX ORDER — CEPHALEXIN 500 MG/1
500 CAPSULE ORAL 3 TIMES DAILY
Qty: 30 CAPSULE | Refills: 0 | Status: SHIPPED | OUTPATIENT
Start: 2023-10-09

## 2023-10-09 RX ORDER — PRAVASTATIN SODIUM 20 MG
20 TABLET ORAL
Qty: 90 TABLET | Refills: 3 | Status: SHIPPED | OUTPATIENT
Start: 2023-10-09

## 2023-10-09 RX ORDER — ALPRAZOLAM 0.25 MG/1
TABLET ORAL
Qty: 30 TABLET | Refills: 0 | Status: SHIPPED | OUTPATIENT
Start: 2023-10-09 | End: 2023-11-09

## 2023-10-09 SDOH — ECONOMIC STABILITY: INCOME INSECURITY: HOW HARD IS IT FOR YOU TO PAY FOR THE VERY BASICS LIKE FOOD, HOUSING, MEDICAL CARE, AND HEATING?: NOT HARD AT ALL

## 2023-10-09 SDOH — ECONOMIC STABILITY: FOOD INSECURITY: WITHIN THE PAST 12 MONTHS, YOU WORRIED THAT YOUR FOOD WOULD RUN OUT BEFORE YOU GOT MONEY TO BUY MORE.: NEVER TRUE

## 2023-10-09 SDOH — ECONOMIC STABILITY: FOOD INSECURITY: WITHIN THE PAST 12 MONTHS, THE FOOD YOU BOUGHT JUST DIDN'T LAST AND YOU DIDN'T HAVE MONEY TO GET MORE.: NEVER TRUE

## 2023-10-09 ASSESSMENT — PATIENT HEALTH QUESTIONNAIRE - PHQ9
SUM OF ALL RESPONSES TO PHQ QUESTIONS 1-9: 0
2. FEELING DOWN, DEPRESSED OR HOPELESS: 0
SUM OF ALL RESPONSES TO PHQ QUESTIONS 1-9: 0
SUM OF ALL RESPONSES TO PHQ9 QUESTIONS 1 & 2: 0
1. LITTLE INTEREST OR PLEASURE IN DOING THINGS: 0

## 2023-10-09 ASSESSMENT — ANXIETY QUESTIONNAIRES
3. WORRYING TOO MUCH ABOUT DIFFERENT THINGS: 0
1. FEELING NERVOUS, ANXIOUS, OR ON EDGE: 1
GAD7 TOTAL SCORE: 2
IF YOU CHECKED OFF ANY PROBLEMS ON THIS QUESTIONNAIRE, HOW DIFFICULT HAVE THESE PROBLEMS MADE IT FOR YOU TO DO YOUR WORK, TAKE CARE OF THINGS AT HOME, OR GET ALONG WITH OTHER PEOPLE: NOT DIFFICULT AT ALL
7. FEELING AFRAID AS IF SOMETHING AWFUL MIGHT HAPPEN: 0
6. BECOMING EASILY ANNOYED OR IRRITABLE: 0
5. BEING SO RESTLESS THAT IT IS HARD TO SIT STILL: 0
4. TROUBLE RELAXING: 1
2. NOT BEING ABLE TO STOP OR CONTROL WORRYING: 0

## 2023-11-05 ENCOUNTER — OFFICE VISIT (OUTPATIENT)
Age: 63
End: 2023-11-05

## 2023-11-05 VITALS
TEMPERATURE: 98.5 F | HEIGHT: 61 IN | HEART RATE: 75 BPM | BODY MASS INDEX: 46.82 KG/M2 | RESPIRATION RATE: 15 BRPM | OXYGEN SATURATION: 98 % | WEIGHT: 248 LBS | SYSTOLIC BLOOD PRESSURE: 146 MMHG | DIASTOLIC BLOOD PRESSURE: 78 MMHG
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
## 2023-11-16 ENCOUNTER — OFFICE VISIT (OUTPATIENT)
Age: 63
End: 2023-11-16

## 2023-11-16 VITALS
RESPIRATION RATE: 16 BRPM | WEIGHT: 245 LBS | TEMPERATURE: 98.6 F | BODY MASS INDEX: 46.29 KG/M2 | OXYGEN SATURATION: 95 % | HEART RATE: 81 BPM | SYSTOLIC BLOOD PRESSURE: 132 MMHG | DIASTOLIC BLOOD PRESSURE: 79 MMHG

## 2023-11-16 DIAGNOSIS — R05.1 ACUTE COUGH: ICD-10-CM

## 2023-11-16 DIAGNOSIS — J40 BRONCHITIS: Primary | ICD-10-CM

## 2023-11-16 RX ORDER — BENZONATATE 200 MG/1
200 CAPSULE ORAL 3 TIMES DAILY PRN
Qty: 21 CAPSULE | Refills: 0 | Status: SHIPPED | OUTPATIENT
Start: 2023-11-16 | End: 2023-11-23

## 2023-11-16 RX ORDER — ALBUTEROL SULFATE 90 UG/1
2 AEROSOL, METERED RESPIRATORY (INHALATION) EVERY 4 HOURS PRN
Qty: 18 G | Refills: 0 | Status: SHIPPED | OUTPATIENT
Start: 2023-11-16

## 2023-11-16 RX ORDER — PREDNISONE 20 MG/1
40 TABLET ORAL DAILY
Qty: 10 TABLET | Refills: 0 | Status: SHIPPED | OUTPATIENT
Start: 2023-11-16 | End: 2023-11-21

## 2023-11-16 NOTE — PATIENT INSTRUCTIONS
Will treat as a viral bronchitis  Benzonatate up to 3x per day as needed for cough  Albuterol every 4-6 hours as needed for cough or wheezing  Continue Mucinex, or can try Tylenol cold and flu (day and night pack) to help with symptoms  Will call you with results of chest xray once resulted and can adjust plan if necessary  Return if symptoms persist or worsen  Go to ED with any severe shortness of breath or chest pain

## 2023-11-16 NOTE — PROGRESS NOTES
nursing note reviewed. Constitutional:       General: She is not in acute distress. Appearance: Normal appearance. She is obese. She is not ill-appearing. HENT:      Head: Normocephalic and atraumatic. Right Ear: Tympanic membrane, ear canal and external ear normal. There is no impacted cerumen. Left Ear: Tympanic membrane, ear canal and external ear normal. There is no impacted cerumen. Nose: Congestion present. No rhinorrhea. Mouth/Throat:      Mouth: Mucous membranes are moist.      Pharynx: Oropharynx is clear. No oropharyngeal exudate or posterior oropharyngeal erythema. Cardiovascular:      Rate and Rhythm: Normal rate and regular rhythm. Pulses: Normal pulses. Heart sounds: No friction rub. No gallop. Pulmonary:      Effort: Pulmonary effort is normal. No respiratory distress. Breath sounds: Examination of the right-upper field reveals wheezing and rhonchi. Examination of the right-middle field reveals wheezing and rhonchi. Wheezing and rhonchi present. No rales. Musculoskeletal:      Cervical back: Normal range of motion and neck supple. No tenderness. Lymphadenopathy:      Cervical: No cervical adenopathy. Skin:     General: Skin is warm and dry. Neurological:      General: No focal deficit present. Mental Status: She is alert and oriented to person, place, and time. An electronic signature was used to authenticate this note.     NICK Young NP

## 2024-01-04 NOTE — PATIENT INSTRUCTIONS
Use pepcid complete as needed for breakthrough reflux.       Sleep hygiene: Basic rules for a good night's sleep  Sleep only as much as you need to feel rested and then get out of bed   Keep a regular sleep schedule   Do not try to sleep unless you feel sleepy   Exercise regularly, preferably at least 4 to 5 hours before bedtime   Avoid caffeinated beverages after lunch   Avoid alcohol near bedtime: no \"night cap\"   Avoid smoking, especially in the evening   Do not go to bed hungry   Make the bedroom environment conducive to sleep   Avoid prolonged use of light-emitting screens before bedtime    Deal with your worries before bedtime Her/She

## 2024-01-05 DIAGNOSIS — F41.9 ANXIETY: ICD-10-CM

## 2024-01-05 RX ORDER — ALPRAZOLAM 0.25 MG/1
TABLET ORAL
Qty: 30 TABLET | Refills: 0 | Status: SHIPPED | OUTPATIENT
Start: 2024-01-05 | End: 2024-02-05

## 2024-02-21 ENCOUNTER — OFFICE VISIT (OUTPATIENT)
Age: 64
End: 2024-02-21

## 2024-02-21 VITALS
DIASTOLIC BLOOD PRESSURE: 78 MMHG | HEART RATE: 117 BPM | WEIGHT: 244.8 LBS | RESPIRATION RATE: 18 BRPM | BODY MASS INDEX: 46.22 KG/M2 | SYSTOLIC BLOOD PRESSURE: 113 MMHG | TEMPERATURE: 98.9 F | HEIGHT: 61 IN | OXYGEN SATURATION: 94 %

## 2024-02-21 DIAGNOSIS — T14.90XA TRAUMA: ICD-10-CM

## 2024-02-21 DIAGNOSIS — M62.830 MUSCLE SPASM OF BACK: ICD-10-CM

## 2024-02-21 DIAGNOSIS — S92.411A CLOSED DISPLACED FRACTURE OF PROXIMAL PHALANX OF RIGHT GREAT TOE, INITIAL ENCOUNTER: Primary | ICD-10-CM

## 2024-02-21 RX ORDER — MULTIVIT-MIN/IRON/FOLIC ACID/K 18-600-40
CAPSULE ORAL
COMMUNITY

## 2024-02-21 RX ORDER — CYCLOBENZAPRINE HCL 10 MG
10 TABLET ORAL 3 TIMES DAILY PRN
Qty: 21 TABLET | Refills: 0 | Status: SHIPPED | OUTPATIENT
Start: 2024-02-21 | End: 2024-03-02

## 2024-02-21 NOTE — PATIENT INSTRUCTIONS
Fracture of toe- refer to Orthopedics for follow up.    Back spasm- prescribed Flexeril, may take up to 3 times/day.     I have discussed any testing results, diagnosis and treatment plan. If symptoms worsen please present to your local ED for urgent matters. Otherwise, please follow up with your PCP. Thank you for seeing us today at [facility]. I I hope you feel better soon.

## 2024-02-21 NOTE — PROGRESS NOTES
Katie Leon (:  1960) is a 63 y.o. female,Established patient, here for evaluation of the following chief complaint(s):  Toe Injury (Slipped and fell on cruise deck 14 days ago right big toe injury, 1 week back spasm /)        Assessment    1. Closed displaced fracture of proximal phalanx of right great toe, initial encounter  2. Trauma  -     XR FOOT RIGHT (MIN 3 VIEWS); Future  3. Muscle spasm of back  -     cyclobenzaprine (FLEXERIL) 10 MG tablet; Take 1 tablet by mouth 3 times daily as needed for Muscle spasms, Disp-21 tablet, R-0Normal     Plan    Fracture of right great toe, minimally displaced, placed in splint, non-weight bearing and referred to orthopedics. If back spasms do not resolve, recommend further evaluation.     I have discussed the results of my assessment, diagnosis and treatment plan with the patient. The patient also understands that early in the process of an illness, an Urgent Care work-up can be falsely reassuring. Therefore, if symptoms change, worsen or do not resolve and remain persistent, they should return to Urgent Care or seek further evaluation in the ED for immediate assessment. Additionally, they should continue care with their Primary provider. No further questions remained and patient was discharged to home.     Subjective    HPI  Patient states she slipped while barefoot, getting out of pool. She hit her knee and elbow. . Later that evening she noted her toe was sore, iced for several days but continues with pain on right side of foot.   Additionally, patient is having some spasms in her back, she believes from change in gait.       Review of Systems    Review of Systems   Constitutional: Negative.    Respiratory:  Negative for shortness of breath.    Cardiovascular:  Negative for chest pain and leg swelling.   Musculoskeletal:  Positive for back pain and gait problem.        Pain at base of right great toe        Allergies   Allergen Reactions    Penicillins

## 2024-03-12 DIAGNOSIS — E11.69 TYPE 2 DIABETES MELLITUS WITH OTHER SPECIFIED COMPLICATION, WITHOUT LONG-TERM CURRENT USE OF INSULIN (HCC): ICD-10-CM

## 2024-03-16 ASSESSMENT — ENCOUNTER SYMPTOMS
BACK PAIN: 1
SHORTNESS OF BREATH: 0

## 2024-03-23 DIAGNOSIS — F41.9 ANXIETY: ICD-10-CM

## 2024-03-24 RX ORDER — ALPRAZOLAM 0.25 MG/1
TABLET ORAL
Qty: 30 TABLET | Refills: 0 | Status: SHIPPED | OUTPATIENT
Start: 2024-03-24 | End: 2024-04-24

## 2024-05-02 NOTE — PROGRESS NOTES
Resource Strain: Low Risk  (10/9/2023)    Overall Financial Resource Strain (CARDIA)     Difficulty of Paying Living Expenses: Not hard at all   Food Insecurity: Not on file (10/9/2023)   Transportation Needs: Unknown (10/9/2023)    PRAPARE - Transportation     Lack of Transportation (Medical): Not on file     Lack of Transportation (Non-Medical): No   Physical Activity: Not on file   Stress: Not on file   Social Connections: Not on file   Intimate Partner Violence: Not on file   Housing Stability: Unknown (10/9/2023)    Housing Stability Vital Sign     Unable to Pay for Housing in the Last Year: Not on file     Number of Places Lived in the Last Year: Not on file     Unstable Housing in the Last Year: No        Current Outpatient Medications on File Prior to Visit   Medication Sig Dispense Refill    metFORMIN (GLUCOPHAGE) 1000 MG tablet TAKE 1 TABLET BY MOUTH TWICE DAILY WITH MEALS 180 tablet 1    Cholecalciferol (VITAMIN D) 50 MCG (2000 UT) CAPS capsule Take by mouth      hydroCHLOROthiazide (HYDRODIURIL) 12.5 MG tablet Take 1 tablet by mouth daily 90 tablet 3    losartan (COZAAR) 50 MG tablet Take 1 tablet by mouth daily 90 tablet 3    pravastatin (PRAVACHOL) 20 MG tablet Take 1 tablet by mouth nightly 90 tablet 3    Apremilast (OTEZLA) 30 MG TABS 2 times daily       No current facility-administered medications on file prior to visit.       Review of Systems  Pertinent items are noted in HPI.    Objective:     /61   Pulse 83   Temp 97.2 °F (36.2 °C) (Temporal)   Resp 16   Ht 1.549 m (5' 1\")   Wt 111.1 kg (245 lb)   SpO2 96%   BMI 46.29 kg/m²   Gen: well appearing female  HEENT:   PERRL,normal conjunctiva. External ear and canals normal, TMs no opacification or erythema,  OP no erythema, no exudates, MMM  Neck:  Supple. Thyroid normal size, nontender, without nodules. No masses or LAD  Resp:  No wheezing, no rhonchi, no rales.  CV:  RRR, normal S1S2, no murmur.    GI: soft, nontender, without masses.

## 2024-05-03 ENCOUNTER — OFFICE VISIT (OUTPATIENT)
Age: 64
End: 2024-05-03
Payer: COMMERCIAL

## 2024-05-03 VITALS
HEART RATE: 83 BPM | TEMPERATURE: 97.2 F | SYSTOLIC BLOOD PRESSURE: 111 MMHG | WEIGHT: 245 LBS | DIASTOLIC BLOOD PRESSURE: 61 MMHG | HEIGHT: 61 IN | BODY MASS INDEX: 46.26 KG/M2 | RESPIRATION RATE: 16 BRPM | OXYGEN SATURATION: 96 %

## 2024-05-03 DIAGNOSIS — D68.00 VON WILLEBRAND DISEASE, UNSPECIFIED (HCC): ICD-10-CM

## 2024-05-03 DIAGNOSIS — E66.01 CLASS 3 SEVERE OBESITY WITH SERIOUS COMORBIDITY AND BODY MASS INDEX (BMI) OF 45.0 TO 49.9 IN ADULT, UNSPECIFIED OBESITY TYPE (HCC): ICD-10-CM

## 2024-05-03 DIAGNOSIS — Z00.00 ROUTINE MEDICAL EXAM: Primary | ICD-10-CM

## 2024-05-03 DIAGNOSIS — I10 ESSENTIAL (PRIMARY) HYPERTENSION: ICD-10-CM

## 2024-05-03 DIAGNOSIS — E55.9 VITAMIN D DEFICIENCY: ICD-10-CM

## 2024-05-03 DIAGNOSIS — E11.69 TYPE 2 DIABETES MELLITUS WITH OTHER SPECIFIED COMPLICATION, WITHOUT LONG-TERM CURRENT USE OF INSULIN (HCC): ICD-10-CM

## 2024-05-03 DIAGNOSIS — E78.00 PURE HYPERCHOLESTEROLEMIA: ICD-10-CM

## 2024-05-03 PROCEDURE — 99396 PREV VISIT EST AGE 40-64: CPT | Performed by: FAMILY MEDICINE

## 2024-05-03 PROCEDURE — 3074F SYST BP LT 130 MM HG: CPT | Performed by: FAMILY MEDICINE

## 2024-05-03 PROCEDURE — 3078F DIAST BP <80 MM HG: CPT | Performed by: FAMILY MEDICINE

## 2024-05-03 RX ORDER — TIRZEPATIDE 2.5 MG/.5ML
2.5 INJECTION, SOLUTION SUBCUTANEOUS WEEKLY
Qty: 2 ML | Refills: 0 | Status: SHIPPED | OUTPATIENT
Start: 2024-05-03

## 2024-05-03 ASSESSMENT — PATIENT HEALTH QUESTIONNAIRE - PHQ9
2. FEELING DOWN, DEPRESSED OR HOPELESS: NOT AT ALL
1. LITTLE INTEREST OR PLEASURE IN DOING THINGS: NOT AT ALL
SUM OF ALL RESPONSES TO PHQ9 QUESTIONS 1 & 2: 0
SUM OF ALL RESPONSES TO PHQ QUESTIONS 1-9: 0

## 2024-05-03 NOTE — PROGRESS NOTES
\"Have you been to the ER, urgent care clinic since your last visit?  Hospitalized since your last visit?\"    02/21/2024 [facility]    “Have you seen or consulted any other health care providers outside of [facility] since your last visit?”    NO            Click Here for Release of Records Request

## 2024-05-04 LAB
25(OH)D3+25(OH)D2 SERPL-MCNC: 36.1 NG/ML (ref 30–100)
ALBUMIN SERPL-MCNC: 4.3 G/DL (ref 3.9–4.9)
ALBUMIN/GLOB SERPL: 1.6 {RATIO} (ref 1.2–2.2)
ALP SERPL-CCNC: 95 IU/L (ref 44–121)
ALT SERPL-CCNC: 21 IU/L (ref 0–32)
APPEARANCE UR: CLEAR
AST SERPL-CCNC: 21 IU/L (ref 0–40)
BASOPHILS # BLD AUTO: 0.1 X10E3/UL (ref 0–0.2)
BASOPHILS NFR BLD AUTO: 1 %
BILIRUB SERPL-MCNC: <0.2 MG/DL (ref 0–1.2)
BILIRUB UR QL STRIP: NEGATIVE
BUN SERPL-MCNC: 18 MG/DL (ref 8–27)
BUN/CREAT SERPL: 19 (ref 12–28)
CALCIUM SERPL-MCNC: 9.7 MG/DL (ref 8.7–10.3)
CHLORIDE SERPL-SCNC: 98 MMOL/L (ref 96–106)
CHOLEST SERPL-MCNC: 163 MG/DL (ref 100–199)
CO2 SERPL-SCNC: 25 MMOL/L (ref 20–29)
COLOR UR: YELLOW
CREAT SERPL-MCNC: 0.94 MG/DL (ref 0.57–1)
EGFRCR SERPLBLD CKD-EPI 2021: 68 ML/MIN/1.73
EOSINOPHIL # BLD AUTO: 0.6 X10E3/UL (ref 0–0.4)
EOSINOPHIL NFR BLD AUTO: 6 %
ERYTHROCYTE [DISTWIDTH] IN BLOOD BY AUTOMATED COUNT: 13.6 % (ref 11.7–15.4)
GLOBULIN SER CALC-MCNC: 2.7 G/DL (ref 1.5–4.5)
GLUCOSE SERPL-MCNC: 108 MG/DL (ref 70–99)
GLUCOSE UR QL STRIP: NEGATIVE
HBA1C MFR BLD: 7.4 % (ref 4.8–5.6)
HCT VFR BLD AUTO: 44 % (ref 34–46.6)
HDLC SERPL-MCNC: 49 MG/DL
HGB BLD-MCNC: 13.6 G/DL (ref 11.1–15.9)
HGB UR QL STRIP: NEGATIVE
IMM GRANULOCYTES # BLD AUTO: 0.1 X10E3/UL (ref 0–0.1)
IMM GRANULOCYTES NFR BLD AUTO: 1 %
KETONES UR QL STRIP: NEGATIVE
LDLC SERPL CALC-MCNC: 73 MG/DL (ref 0–99)
LEUKOCYTE ESTERASE UR QL STRIP: ABNORMAL
LYMPHOCYTES # BLD AUTO: 2.4 X10E3/UL (ref 0.7–3.1)
LYMPHOCYTES NFR BLD AUTO: 24 %
MCH RBC QN AUTO: 24.7 PG (ref 26.6–33)
MCHC RBC AUTO-ENTMCNC: 30.9 G/DL (ref 31.5–35.7)
MCV RBC AUTO: 80 FL (ref 79–97)
MONOCYTES # BLD AUTO: 0.8 X10E3/UL (ref 0.1–0.9)
MONOCYTES NFR BLD AUTO: 8 %
NEUTROPHILS # BLD AUTO: 6.1 X10E3/UL (ref 1.4–7)
NEUTROPHILS NFR BLD AUTO: 60 %
NITRITE UR QL STRIP: NEGATIVE
PH UR STRIP: 5.5 [PH] (ref 5–7.5)
PLATELET # BLD AUTO: 353 X10E3/UL (ref 150–450)
POTASSIUM SERPL-SCNC: 4.6 MMOL/L (ref 3.5–5.2)
PROT SERPL-MCNC: 7 G/DL (ref 6–8.5)
PROT UR QL STRIP: NEGATIVE
RBC # BLD AUTO: 5.51 X10E6/UL (ref 3.77–5.28)
SODIUM SERPL-SCNC: 139 MMOL/L (ref 134–144)
SP GR UR STRIP: 1.01 (ref 1–1.03)
TRIGL SERPL-MCNC: 254 MG/DL (ref 0–149)
TSH SERPL DL<=0.005 MIU/L-ACNC: 1.09 UIU/ML (ref 0.45–4.5)
UROBILINOGEN UR STRIP-MCNC: 0.2 MG/DL (ref 0.2–1)
VLDLC SERPL CALC-MCNC: 41 MG/DL (ref 5–40)
WBC # BLD AUTO: 10 X10E3/UL (ref 3.4–10.8)

## 2024-05-05 LAB
ALBUMIN/CREAT UR: 37 MG/G CREAT (ref 0–29)
CREAT UR-MCNC: 33 MG/DL
MICROALBUMIN UR-MCNC: 12.3 UG/ML

## 2024-05-06 ENCOUNTER — TELEPHONE (OUTPATIENT)
Age: 64
End: 2024-05-06

## 2024-05-06 NOTE — TELEPHONE ENCOUNTER
[facility] states that Mounjaro .025- 0.5   PA is not needed.    She states a script will need to be sent to pharm

## 2024-05-16 ENCOUNTER — TELEPHONE (OUTPATIENT)
Age: 64
End: 2024-05-16

## 2024-05-16 DIAGNOSIS — E11.9 TYPE 2 DIABETES MELLITUS WITHOUT COMPLICATION, WITHOUT LONG-TERM CURRENT USE OF INSULIN (HCC): Primary | ICD-10-CM

## 2024-05-20 ENCOUNTER — TELEPHONE (OUTPATIENT)
Age: 64
End: 2024-05-20

## 2024-05-20 DIAGNOSIS — E11.9 TYPE 2 DIABETES MELLITUS WITHOUT COMPLICATION, WITHOUT LONG-TERM CURRENT USE OF INSULIN (HCC): ICD-10-CM

## 2024-05-20 NOTE — TELEPHONE ENCOUNTER
----- Message from Kasia Shell MA sent at 5/20/2024  8:43 AM EDT -----  Regarding: FW: labs/additional medication for diabetes.  Contact: [phone]    ----- Message -----  From: Tan Warner  Sent: 5/20/2024   8:00 AM EDT  To: Kasia Shell MA  Subject: FW: labs/additional medication for diabetes.         ----- Message -----  From: Katie Leon  Sent: 5/19/2024   3:44 PM EDT  To: #  Subject: labs/additional medication for diabetes.         Please verify that I can take Januvia with the mounjaro and metformin.

## 2024-05-25 DIAGNOSIS — I10 ESSENTIAL HYPERTENSION, BENIGN: ICD-10-CM

## 2024-05-26 RX ORDER — LOSARTAN POTASSIUM 50 MG/1
50 TABLET ORAL DAILY
Qty: 90 TABLET | Refills: 1 | Status: SHIPPED | OUTPATIENT
Start: 2024-05-26

## 2024-06-05 ENCOUNTER — HOSPITAL ENCOUNTER (INPATIENT)
Facility: HOSPITAL | Age: 64
LOS: 2 days | Discharge: HOME | End: 2024-06-07
Attending: STUDENT IN AN ORGANIZED HEALTH CARE EDUCATION/TRAINING PROGRAM | Admitting: STUDENT IN AN ORGANIZED HEALTH CARE EDUCATION/TRAINING PROGRAM
Payer: COMMERCIAL

## 2024-06-05 ENCOUNTER — APPOINTMENT (OUTPATIENT)
Facility: HOSPITAL | Age: 64
End: 2024-06-05
Payer: COMMERCIAL

## 2024-06-05 DIAGNOSIS — K62.5 RECTAL BLEEDING: Primary | ICD-10-CM

## 2024-06-05 PROBLEM — K92.2 LOWER GI BLEED: Status: ACTIVE | Noted: 2024-06-05

## 2024-06-05 LAB
ABO + RH BLD: NORMAL
ALBUMIN SERPL-MCNC: 3.6 G/DL (ref 3.5–5)
ALBUMIN/GLOB SERPL: 1 (ref 1.1–2.2)
ALP SERPL-CCNC: 85 U/L (ref 45–117)
ALT SERPL-CCNC: 28 U/L (ref 12–78)
ANION GAP SERPL CALC-SCNC: 6 MMOL/L (ref 5–15)
AST SERPL-CCNC: 18 U/L (ref 15–37)
BASOPHILS # BLD: 0.1 K/UL (ref 0–0.1)
BASOPHILS NFR BLD: 1 % (ref 0–1)
BILIRUB SERPL-MCNC: 0.4 MG/DL (ref 0.2–1)
BLOOD GROUP ANTIBODIES SERPL: NORMAL
BUN SERPL-MCNC: 14 MG/DL (ref 6–20)
BUN/CREAT SERPL: 14 (ref 12–20)
CALCIUM SERPL-MCNC: 9.1 MG/DL (ref 8.5–10.1)
CHLORIDE SERPL-SCNC: 100 MMOL/L (ref 97–108)
CO2 SERPL-SCNC: 31 MMOL/L (ref 21–32)
CREAT SERPL-MCNC: 0.97 MG/DL (ref 0.55–1.02)
DIFFERENTIAL METHOD BLD: ABNORMAL
EOSINOPHIL # BLD: 0.3 K/UL (ref 0–0.4)
EOSINOPHIL NFR BLD: 3 % (ref 0–7)
ERYTHROCYTE [DISTWIDTH] IN BLOOD BY AUTOMATED COUNT: 13.9 % (ref 11.5–14.5)
EST. AVERAGE GLUCOSE BLD GHB EST-MCNC: 143 MG/DL
GLOBULIN SER CALC-MCNC: 3.6 G/DL (ref 2–4)
GLUCOSE BLD STRIP.AUTO-MCNC: 103 MG/DL (ref 65–117)
GLUCOSE BLD STRIP.AUTO-MCNC: 148 MG/DL (ref 65–117)
GLUCOSE BLD STRIP.AUTO-MCNC: 98 MG/DL (ref 65–117)
GLUCOSE SERPL-MCNC: 104 MG/DL (ref 65–100)
HBA1C MFR BLD: 6.6 % (ref 4–5.6)
HCT VFR BLD AUTO: 39.4 % (ref 35–47)
HCT VFR BLD AUTO: 43.3 % (ref 35–47)
HGB BLD-MCNC: 12.3 G/DL (ref 11.5–16)
HGB BLD-MCNC: 13.2 G/DL (ref 11.5–16)
IMM GRANULOCYTES # BLD AUTO: 0 K/UL (ref 0–0.04)
IMM GRANULOCYTES NFR BLD AUTO: 0 % (ref 0–0.5)
LYMPHOCYTES # BLD: 2 K/UL (ref 0.8–3.5)
LYMPHOCYTES NFR BLD: 22 % (ref 12–49)
MCH RBC QN AUTO: 23.8 PG (ref 26–34)
MCHC RBC AUTO-ENTMCNC: 30.5 G/DL (ref 30–36.5)
MCV RBC AUTO: 78.2 FL (ref 80–99)
MONOCYTES # BLD: 0.5 K/UL (ref 0–1)
MONOCYTES NFR BLD: 6 % (ref 5–13)
NEUTS SEG # BLD: 6.3 K/UL (ref 1.8–8)
NEUTS SEG NFR BLD: 68 % (ref 32–75)
NRBC # BLD: 0 K/UL (ref 0–0.01)
NRBC BLD-RTO: 0 PER 100 WBC
PLATELET # BLD AUTO: 315 K/UL (ref 150–400)
PMV BLD AUTO: 9.8 FL (ref 8.9–12.9)
POTASSIUM SERPL-SCNC: 3.5 MMOL/L (ref 3.5–5.1)
PROT SERPL-MCNC: 7.2 G/DL (ref 6.4–8.2)
RBC # BLD AUTO: 5.54 M/UL (ref 3.8–5.2)
SERVICE CMNT-IMP: ABNORMAL
SERVICE CMNT-IMP: NORMAL
SERVICE CMNT-IMP: NORMAL
SODIUM SERPL-SCNC: 137 MMOL/L (ref 136–145)
SPECIMEN EXP DATE BLD: NORMAL
WBC # BLD AUTO: 9.2 K/UL (ref 3.6–11)

## 2024-06-05 PROCEDURE — 85025 COMPLETE CBC W/AUTO DIFF WBC: CPT

## 2024-06-05 PROCEDURE — 99285 EMERGENCY DEPT VISIT HI MDM: CPT

## 2024-06-05 PROCEDURE — 86901 BLOOD TYPING SEROLOGIC RH(D): CPT

## 2024-06-05 PROCEDURE — 99222 1ST HOSP IP/OBS MODERATE 55: CPT | Performed by: INTERNAL MEDICINE

## 2024-06-05 PROCEDURE — 2580000003 HC RX 258: Performed by: STUDENT IN AN ORGANIZED HEALTH CARE EDUCATION/TRAINING PROGRAM

## 2024-06-05 PROCEDURE — 85018 HEMOGLOBIN: CPT

## 2024-06-05 PROCEDURE — 85014 HEMATOCRIT: CPT

## 2024-06-05 PROCEDURE — 83036 HEMOGLOBIN GLYCOSYLATED A1C: CPT

## 2024-06-05 PROCEDURE — 86900 BLOOD TYPING SEROLOGIC ABO: CPT

## 2024-06-05 PROCEDURE — 6370000000 HC RX 637 (ALT 250 FOR IP): Performed by: STUDENT IN AN ORGANIZED HEALTH CARE EDUCATION/TRAINING PROGRAM

## 2024-06-05 PROCEDURE — 80053 COMPREHEN METABOLIC PANEL: CPT

## 2024-06-05 PROCEDURE — 74178 CT ABD&PLV WO CNTR FLWD CNTR: CPT

## 2024-06-05 PROCEDURE — 36415 COLL VENOUS BLD VENIPUNCTURE: CPT

## 2024-06-05 PROCEDURE — 86850 RBC ANTIBODY SCREEN: CPT

## 2024-06-05 PROCEDURE — 82962 GLUCOSE BLOOD TEST: CPT

## 2024-06-05 PROCEDURE — 1100000003 HC PRIVATE W/ TELEMETRY

## 2024-06-05 PROCEDURE — 6360000004 HC RX CONTRAST MEDICATION: Performed by: STUDENT IN AN ORGANIZED HEALTH CARE EDUCATION/TRAINING PROGRAM

## 2024-06-05 RX ORDER — ONDANSETRON 2 MG/ML
4 INJECTION INTRAMUSCULAR; INTRAVENOUS EVERY 6 HOURS PRN
Status: DISCONTINUED | OUTPATIENT
Start: 2024-06-05 | End: 2024-06-07 | Stop reason: HOSPADM

## 2024-06-05 RX ORDER — SODIUM CHLORIDE 0.9 % (FLUSH) 0.9 %
5-40 SYRINGE (ML) INJECTION PRN
Status: DISCONTINUED | OUTPATIENT
Start: 2024-06-05 | End: 2024-06-07 | Stop reason: HOSPADM

## 2024-06-05 RX ORDER — ONDANSETRON 4 MG/1
4 TABLET, ORALLY DISINTEGRATING ORAL EVERY 8 HOURS PRN
Status: DISCONTINUED | OUTPATIENT
Start: 2024-06-05 | End: 2024-06-07 | Stop reason: HOSPADM

## 2024-06-05 RX ORDER — ACETAMINOPHEN 500 MG
1000 TABLET ORAL
Status: COMPLETED | OUTPATIENT
Start: 2024-06-05 | End: 2024-06-05

## 2024-06-05 RX ORDER — ACETAMINOPHEN 650 MG/1
650 SUPPOSITORY RECTAL EVERY 6 HOURS PRN
Status: DISCONTINUED | OUTPATIENT
Start: 2024-06-05 | End: 2024-06-07 | Stop reason: HOSPADM

## 2024-06-05 RX ORDER — ONDANSETRON 2 MG/ML
4 INJECTION INTRAMUSCULAR; INTRAVENOUS EVERY 6 HOURS PRN
Status: DISCONTINUED | OUTPATIENT
Start: 2024-06-05 | End: 2024-06-05 | Stop reason: SDUPTHER

## 2024-06-05 RX ORDER — TIRZEPATIDE 2.5 MG/.5ML
0.5 INJECTION, SOLUTION SUBCUTANEOUS WEEKLY
COMMUNITY
End: 2024-06-14 | Stop reason: SDUPTHER

## 2024-06-05 RX ORDER — DEXTROSE MONOHYDRATE 100 MG/ML
INJECTION, SOLUTION INTRAVENOUS CONTINUOUS PRN
Status: DISCONTINUED | OUTPATIENT
Start: 2024-06-05 | End: 2024-06-07 | Stop reason: HOSPADM

## 2024-06-05 RX ORDER — POLYETHYLENE GLYCOL 3350 17 G/17G
17 POWDER, FOR SOLUTION ORAL DAILY PRN
Status: DISCONTINUED | OUTPATIENT
Start: 2024-06-05 | End: 2024-06-07 | Stop reason: HOSPADM

## 2024-06-05 RX ORDER — ACETAMINOPHEN 325 MG/1
650 TABLET ORAL EVERY 6 HOURS PRN
Status: DISCONTINUED | OUTPATIENT
Start: 2024-06-05 | End: 2024-06-07 | Stop reason: HOSPADM

## 2024-06-05 RX ORDER — ALPRAZOLAM 0.25 MG/1
0.25 TABLET ORAL NIGHTLY PRN
Status: DISCONTINUED | OUTPATIENT
Start: 2024-06-05 | End: 2024-06-07 | Stop reason: HOSPADM

## 2024-06-05 RX ORDER — GLUCAGON 1 MG/ML
1 KIT INJECTION PRN
Status: DISCONTINUED | OUTPATIENT
Start: 2024-06-05 | End: 2024-06-07 | Stop reason: HOSPADM

## 2024-06-05 RX ORDER — SODIUM CHLORIDE 9 MG/ML
INJECTION, SOLUTION INTRAVENOUS PRN
Status: DISCONTINUED | OUTPATIENT
Start: 2024-06-05 | End: 2024-06-07 | Stop reason: HOSPADM

## 2024-06-05 RX ORDER — ALPRAZOLAM 0.25 MG/1
TABLET ORAL NIGHTLY PRN
COMMUNITY

## 2024-06-05 RX ORDER — INSULIN LISPRO 100 [IU]/ML
0-4 INJECTION, SOLUTION INTRAVENOUS; SUBCUTANEOUS
Status: DISCONTINUED | OUTPATIENT
Start: 2024-06-05 | End: 2024-06-07 | Stop reason: HOSPADM

## 2024-06-05 RX ORDER — INSULIN LISPRO 100 [IU]/ML
0-4 INJECTION, SOLUTION INTRAVENOUS; SUBCUTANEOUS NIGHTLY
Status: DISCONTINUED | OUTPATIENT
Start: 2024-06-05 | End: 2024-06-07 | Stop reason: HOSPADM

## 2024-06-05 RX ORDER — ACETAMINOPHEN 325 MG/1
650 TABLET ORAL EVERY 4 HOURS PRN
Status: DISCONTINUED | OUTPATIENT
Start: 2024-06-05 | End: 2024-06-05 | Stop reason: SDUPTHER

## 2024-06-05 RX ORDER — SODIUM CHLORIDE 0.9 % (FLUSH) 0.9 %
5-40 SYRINGE (ML) INJECTION EVERY 12 HOURS SCHEDULED
Status: DISCONTINUED | OUTPATIENT
Start: 2024-06-05 | End: 2024-06-07 | Stop reason: HOSPADM

## 2024-06-05 RX ADMIN — POLYETHYLENE GLYCOL-3350 AND ELECTROLYTES 2000 ML: 236; 6.74; 5.86; 2.97; 22.74 POWDER, FOR SOLUTION ORAL at 12:42

## 2024-06-05 RX ADMIN — SODIUM CHLORIDE, PRESERVATIVE FREE 10 ML: 5 INJECTION INTRAVENOUS at 21:27

## 2024-06-05 RX ADMIN — ACETAMINOPHEN 1000 MG: 500 TABLET ORAL at 10:03

## 2024-06-05 RX ADMIN — POLYETHYLENE GLYCOL-3350 AND ELECTROLYTES 2000 ML: 236; 6.74; 5.86; 2.97; 22.74 POWDER, FOR SOLUTION ORAL at 17:46

## 2024-06-05 RX ADMIN — IOPAMIDOL 100 ML: 755 INJECTION, SOLUTION INTRAVENOUS at 10:15

## 2024-06-05 RX ADMIN — ACETAMINOPHEN 650 MG: 325 TABLET ORAL at 21:26

## 2024-06-05 ASSESSMENT — PAIN - FUNCTIONAL ASSESSMENT: PAIN_FUNCTIONAL_ASSESSMENT: 0-10

## 2024-06-05 ASSESSMENT — PAIN SCALES - GENERAL
PAINLEVEL_OUTOF10: 0
PAINLEVEL_OUTOF10: 7
PAINLEVEL_OUTOF10: 6
PAINLEVEL_OUTOF10: 0

## 2024-06-05 ASSESSMENT — PAIN DESCRIPTION - DESCRIPTORS: DESCRIPTORS: ACHING

## 2024-06-05 ASSESSMENT — PAIN DESCRIPTION - LOCATION
LOCATION: HEAD
LOCATION: HEAD

## 2024-06-05 ASSESSMENT — LIFESTYLE VARIABLES
HOW MANY STANDARD DRINKS CONTAINING ALCOHOL DO YOU HAVE ON A TYPICAL DAY: 1 OR 2
HOW OFTEN DO YOU HAVE A DRINK CONTAINING ALCOHOL: MONTHLY OR LESS

## 2024-06-05 NOTE — ED NOTES
Attempted to call report. Advised nurse for Rm 3114, RN Dann Ext. [phone], is with a pt at this time.

## 2024-06-05 NOTE — PROGRESS NOTES
End of Shift Note    Bedside shift change report given to PHILIP Yusuf (oncoming nurse) by Ishaan Navarro RN (offgoing nurse).  Report included the following information SBAR, Kardex, ED Summary, Intake/Output, MAR, and Recent Results    Shift worked:  7p-730a     Shift summary and any significant changes:     Admitted pt to unit this afternoon. Started colonoscopy prep. Up ad tavo. BM's are bloody-messaged Dr. Rodrigues, orders received for q6h H/H. No c/o pain.      Concerns for physician to address:       Zone phone for oncoming shift:          Length of Stay:  Expected LOS: 2  Actual LOS: 0      Ishaan Navarro RN

## 2024-06-05 NOTE — PROGRESS NOTES
Admission Medication History Technician Note:    Hemodialysis patient: None    Patient preferred pharmacy Confirmed:    [id number] - [place], [place] - [address] [phone] - F [phone] [address] [facility] [place] [address]  Phone: [phone] Fax: [phone]      Information obtained from¹: Patient and Rx Query    Comments/Recommendations: Updated PTA meds/reviewed patient's allergies.    1)  Medication issues identified: none    2)  Medication changes (since last review):  Added  + Alprazolam  + Probiotic      Removed      Adjusted      3)  Pertinent Pharmacy Findings:  Identified High Alert Medication Information  None     ¹RxQuery pharmacy benefit data reflects medications filled and processed through the patient's insurance, however this data does NOT capture whether the medication was picked up or is currently being taken by the patient.    Allergies:  Penicillins    Chief Complaint for this Admission:    Chief Complaint   Patient presents with    Rectal Bleeding     Pt states she had an OP colonoscopy on Friday with [facility] MD. Pt states she has been having multiple episodes of rectal bleeding since then. Pt states she wants to get checked out \"because she has a blood disorder\". Pt states she has received blood transfusions before.      Prior to Admission Medications:   Current Outpatient Medications   Medication Instructions    ALPRAZolam (XANAX) 0.25 MG tablet Oral, NIGHTLY PRN, Take 1-3 tablets     Apremilast (OTEZLA) 30 MG TABS 1 tablet, Oral, 2 TIMES DAILY    Cholecalciferol (VITAMIN D) 50 MCG (2000 UT) CAPS capsule 1 capsule, Oral, DAILY    hydroCHLOROthiazide 12.5 mg, Oral, DAILY    Lactobacillus (PROBIOTIC ACIDOPHILUS PO) 1 tablet, Oral, DAILY    losartan (COZAAR) 50 mg, Oral, DAILY    metFORMIN (GLUCOPHAGE) 1,000 mg, Oral, 2 TIMES DAILY WITH MEALS    Mounjaro 0.5 mg, SubCUTAneous, WEEKLY, Saturday     pravastatin (PRAVACHOL) 20 mg, Oral, EVERY BEDTIME

## 2024-06-05 NOTE — CONSULTS
[facility] [place] [facility]  [address] [facility] [address] [address]  W: [phone] F: [phone]  Hematology/ Oncology Consult Note      Reason for consult:     Katie Leon is a 63 y.o. female who we have been asked to see by Dr. Rg for history of Von Willebrand's Disease.    Subjective:     Katie Leon is a 63 year old female who is admitted to [facility] for rectal bleeding with recent polypectomy. PMH includes Von Williebrand's Disease, diabetes, GERD, hypertension, migraines, obesity and VON.     Patient seen at bedside, plan for colonoscopy in AM to address bleeding after recent polypectomy.     Review of Systems:  Pertinent items are noted in the History of Present Illness.       Past Medical History:   Diagnosis Date    Achilles tendinitis     Arrhythmia     heart murmur    Chronic pain     Contact dermatitis and other eczema, due to unspecified cause     Diabetes (HCC)     GERD (gastroesophageal reflux disease)     Heel spur, right     Hepatic steatosis     Hypertension     Joint pain     & muscle aches    Migraine     Nephrolithiasis     Obesity     Plantar fasciitis of right foot     Sleep apnea     appiah not use c-pap    Von Willebrand disease (HCC)      Past Surgical History:   Procedure Laterality Date    BREAST SURGERY  1984    breast reduction    CARPAL TUNNEL RELEASE      CHEST SURGERY  1/29/13     THYROIDECTOMY Retrosternal and Retromandibular!    HYSTERECTOMY (CERVIX STATUS UNKNOWN)  2002?    MOHS SURG, ADDL BLOCK      basal cell on nose    ORTHOPEDIC SURGERY  2010    torn meniscus    ORTHOPEDIC SURGERY  2008, 2009    carpal tunnel    THYROIDECTOMY  01/2013    right side      Family History   Problem Relation Age of Onset    Hypertension Father     Stroke Father      Social History     Tobacco Use    Smoking status: Never    Smokeless tobacco: Never   Substance Use Topics    Alcohol use: Yes     Alcohol/week: 1.7  standard drinks of alcohol      Current Facility-Administered Medications   Medication Dose Route Frequency Provider Last Rate Last Admin    polyethylene glycol (GoLYTELY) solution 2,000 mL  2,000 mL Oral Q6H Saulo Rodrigues MD   2,000 mL at 06/05/24 1242    sodium chloride flush 0.9 % injection 5-40 mL  5-40 mL IntraVENous 2 times per day Saulo Rodrigues MD        sodium chloride flush 0.9 % injection 5-40 mL  5-40 mL IntraVENous PRN Saulo Rodrigues MD        0.9 % sodium chloride infusion   IntraVENous PRN Saulo Rodrigues MD        ondansetron (ZOFRAN-ODT) disintegrating tablet 4 mg  4 mg Oral Q8H PRN Saulo Rodrigues MD        Or    ondansetron (ZOFRAN) injection 4 mg  4 mg IntraVENous Q6H PRN Saulo Rodrigues MD        polyethylene glycol (GLYCOLAX) packet 17 g  17 g Oral Daily PRN Saulo Rodrigues MD        acetaminophen (TYLENOL) tablet 650 mg  650 mg Oral Q6H PRN Saulo Rodrigues MD        Or    acetaminophen (TYLENOL) suppository 650 mg  650 mg Rectal Q6H PRN Saulo Rodrigues MD        ALPRAZolam (XANAX) tablet 0.25 mg  0.25 mg Oral Nightly PRN Saulo Rodrigues MD        glucose chewable tablet 16 g  4 tablet Oral PRN Saulo Rodrigues MD        dextrose bolus 10% 125 mL  125 mL IntraVENous PRN Saulo Rodrigues MD        Or    dextrose bolus 10% 250 mL  250 mL IntraVENous PRN Saulo Rodrigues MD        glucagon injection 1 mg  1 mg SubCUTAneous PRN Saulo Rodrigues MD        dextrose 10 % infusion   IntraVENous Continuous PRN Saulo Rodrigues MD        insulin lispro (HUMALOG,ADMELOG) injection vial 0-4 Units  0-4 Units SubCUTAneous TID WC Saulo Rodrigues MD        insulin lispro (HUMALOG,ADMELOG) injection vial 0-4 Units  0-4 Units SubCUTAneous Nightly Saulo Rodrigues MD            Allergies   Allergen Reactions    Penicillins      Other reaction(s): Unknown (comments)  Given as a child and can't remember reaction          Objective:

## 2024-06-05 NOTE — CONSULTS
Brianna Olivo, NP-C                       [phone] cell                  Monday-Thursday 7:30-5:00                           Gastroenterology Consultation Note      Admit Date: 6/5/2024  Consult Date: 6/5/2024   I greatly appreciate your asking me to see Katie Leon, thank you very much for the opportunity to participate in her care.    Narrative Assessment and Plan   GI consultation for rectal bleeding after colonoscopy 5 days ago with a polyp removed from the distal transverse colon.  63-year-old female with PMH of von Willebrand's disease, T2DM, HTN, and HLD.  Underwent colonoscopy 5/31/2024.  Small amount of bleeding 2 days after colonoscopy and then for the past 2 nights that she has been awakened with a sensation her bowels need to move which is followed by [facility], she had 3 episodes early this morning.  No abdominal pain or nausea.  She does not take any blood thinners.  She sees Dr. Marin for her von Willebrand's disease.  Hgb is good.  CT with tiny area of active extravasation at splenic flexure.     Impression:  Rectal bleeding after polypectomy  Von Willebrand's disease  T2DM  HTN  HLD    Plan colonoscopy for tomorrow. Discussed procedure with patient and she is agreeable. Will ask hematology to see in case there is something they feel is needed prior to colonoscopy. CLD today with prep this evening, NPO past midnight.   Follow H&H, transfuse prn    Subjective:     Chief Complaint: Rectal bleeding after colonoscopy    History of Present Illness: GI consultation for rectal bleeding after colonoscopy 5 days ago with a polyp removed from the distal transverse colon.  63-year-old female with past medical history of von Willebrand's disease, T2DM, hypertension, and hyperlipidemia.  She underwent colonoscopy 5/31/2024.  She reports a small amount of rectal bleeding 2 days later and then feeling bad  with some nausea the next day.  She was awakened overnight yesterday with a sensation her bowels need to move and she had a bowel movement with bright red blood.  Last night she was again awakened with a sensation her bowels need to move and she had 3 episodes of rectal bleeding which was described as bright red blood.  No abdominal pain or nausea.  She does not take any blood thinners.  She sees Dr. Marin for her von Willebrand's disease.  Hemoglobin is good, CT with tiny area of active extravasation at splenic flexure.    Labs: WBC 9.2, Hgb 13.2, HCT 43.3, MCV 78.2, platelets 315, sodium 137, potassium 3.5, BUN 14, creatinine 0.97, normal LFTs.    CT Result (most recent):  CT ABDOMEN PELVIS W WO IV CONTRAST 06/05/2024    Narrative  EXAM: CT ABDOMEN PELVIS W WO CONTRAST    INDICATION: gi bleed,    COMPARISON: None.    IV CONTRAST: 100 mL of Isovue-370.    ORAL CONTRAST: None    TECHNIQUE:  Multislice helical CT was performed from the diaphragm to the iliac crest prior  to intravenous contrast administration and from the diaphragm to the symphysis  pubis following intravenous contrast administration.  Contiguous 5 mm axial  images were reconstructed and lung and soft tissue windows were generated.  Coronal and sagittal reformations were generated.  CT dose reduction was  achieved through use of a standardized protocol tailored for this examination  and automatic exposure control for dose modulation.    FINDINGS:  LOWER THORAX: No significant abnormality in the incidentally imaged lower chest.  LIVER: No mass.  BILIARY TREE: Gallbladder is within normal limits. CBD is not dilated.  SPLEEN: within normal limits.  PANCREAS: No mass or ductal dilatation.  ADRENALS: Unremarkable.  KIDNEYS: No mass, calculus, or hydronephrosis.  STOMACH: Unremarkable.  SMALL BOWEL: No dilatation or wall thickening.  COLON: There is a very tiny focus of active extravasation at the splenic  flexure.  APPENDIX: Unremarkable.  PERITONEUM:

## 2024-06-05 NOTE — ED NOTES
TRANSFER - OUT REPORT:    Verbal report given to RN Dann on Katie Leon  being transferred to PHILIP Quigley for routine progression of patient care       Report consisted of patient's Situation, Background, Assessment and   Recommendations(SBAR).     Information from the following report(s) Nurse Handoff Report, ED Encounter Summary, ED SBAR, MAR, and Cardiac Rhythm NSR  was reviewed with the receiving nurse.    [place] Fall Assessment:    Presents to emergency department  because of falls (Syncope, seizure, or loss of consciousness): No  Age > 70: No  Altered Mental Status, Intoxication with alcohol or substance confusion (Disorientation, impaired judgment, poor safety awaremess, or inability to follow instructions): No  Impaired Mobility: Ambulates or transfers with assistive devices or assistance; Unable to ambulate or transer.: No  Nursing Judgement: No          Lines:   Peripheral IV 06/05/24 Left Antecubital (Active)        Opportunity for questions and clarification was provided.      Patient transported with:  Monitor and Tech

## 2024-06-05 NOTE — CARE COORDINATION
Care Management Initial Assessment       RUR:   Readmission? No  1st IM letter given? No  1st  letter given: No    CM completed assessment w/pt at bedside.  No history of HH or DME use.  Patient is independent w/ADL to include driving.  Support system includes, , in-laws & great friends.  Patient uses [facility] on [address]  Patient is expected to have a colonoscopy tomorrow.  No needs or concerns identified at this time.   will transport at d/c       06/05/24 1520   Service Assessment   Patient Orientation Alert and Oriented   Cognition Alert   History Provided By Patient   Primary Caregiver Self   Support Systems Spouse/Significant Other;Family Members;Friends/Neighbors  (in-laws)   PCP Verified by CM Yes   Last Visit to PCP Within last year   Prior Functional Level Independent in ADLs/IADLs   Current Functional Level Independent in ADLs/IADLs   Can patient return to prior living arrangement Yes   Family able to assist with home care needs: Yes   Would you like for me to discuss the discharge plan with any other family members/significant others, and if so, who? No   Financial Resources Other (Comment)  (Mannie)   Community Resources None   Social/Functional History   Lives With Spouse   Type of Home House  (two story home w/2 DOMINIC - living is done on first fl)   Home Equipment None   Active  Yes     Suzanne Ferreira  Ext [phone]

## 2024-06-05 NOTE — ED PROVIDER NOTES
MRM 3 SURG TELE  EMERGENCY DEPARTMENT ENCOUNTER       Pt Name: Katie Leon  MRN: [id number]  Birthdate 1960  Date of evaluation: 6/5/2024  Provider: Rin Elias DO   PCP: Irasema Mejia MD  Note Started: 10:38 PM 6/5/24     CHIEF COMPLAINT       Chief Complaint   Patient presents with    Rectal Bleeding     Pt states she had an OP colonoscopy on Friday with Jarvis HERNANDEZ MD. Pt states she has been having multiple episodes of rectal bleeding since then. Pt states she wants to get checked out \"because she has a blood disorder\". Pt states she has received blood transfusions before.         HISTORY OF PRESENT ILLNESS: 1 or more elements      History From: Patient  None     Katie Leon is a 63 y.o. female who presents with cc of rectal bleeding. Reports intermittent episodes of rectal bleeding starting on Sunday.  She had a colonoscopy done Friday with Dr. Salazar.  She has history of von Willebrand disease.  She reports she has had bright red blood and maroon-colored blood.  She states 1 polyp was removed.  She is concerned that she may have difficulty clotting due to her von Willebrand disease.  She called Dr. Blackwell today who referred her to the ER.  She states his colonoscopy was done routinely.  She has not seen any stool mixed in with the blood today.  She follows with hematology, Dr. Marin. Reports abd cramping when passing blood/stool.      Nursing Notes were all reviewed and agreed with or any disagreements were addressed in the HPI.       PAST HISTORY     Past Medical History:  Past Medical History:   Diagnosis Date    Achilles tendinitis     Arrhythmia     heart murmur    Chronic pain     Contact dermatitis and other eczema, due to unspecified cause     Diabetes (HCC)     GERD (gastroesophageal reflux disease)     Heel spur, right     Hepatic steatosis     Hypertension     Joint pain     & muscle aches    Migraine     Nephrolithiasis     Obesity     Plantar fasciitis of right foot      Sleep apnea     appiah not use c-pap    Von Willebrand disease (HCC)          Past Surgical History:  Past Surgical History:   Procedure Laterality Date    BREAST SURGERY  1984    breast reduction    CARPAL TUNNEL RELEASE      CHEST SURGERY  1/29/13     THYROIDECTOMY Retrosternal and Retromandibular!    HYSTERECTOMY (CERVIX STATUS UNKNOWN)  2002?    MOHS SURG, ADDL BLOCK      basal cell on nose    ORTHOPEDIC SURGERY  2010    torn meniscus    ORTHOPEDIC SURGERY  2008, 2009    carpal tunnel    THYROIDECTOMY  01/2013    right side       Family History:  Family History   Problem Relation Age of Onset    Hypertension Father     Stroke Father        Social History:  Social History     Tobacco Use    Smoking status: Never    Smokeless tobacco: Never   Substance Use Topics    Alcohol use: Yes     Alcohol/week: 1.7 standard drinks of alcohol    Drug use: Never       Allergies:  Allergies   Allergen Reactions    Penicillins      Other reaction(s): Unknown (comments)  Given as a child and can't remember reaction       CURRENT MEDICATIONS      Current Discharge Medication List        CONTINUE these medications which have NOT CHANGED    Details   Tirzepatide (MOUNJARO) 2.5 MG/0.5ML SOPN SC injection Inject 0.5 mg into the skin once a week Saturday      ALPRAZolam (XANAX) 0.25 MG tablet Take by mouth nightly as needed for Sleep. Take 1-3 tablets      Lactobacillus (PROBIOTIC ACIDOPHILUS PO) Take 1 tablet by mouth daily      losartan (COZAAR) 50 MG tablet TAKE 1 TABLET BY MOUTH DAILY  Qty: 90 tablet, Refills: 1    Associated Diagnoses: Essential hypertension, benign      metFORMIN (GLUCOPHAGE) 1000 MG tablet TAKE 1 TABLET BY MOUTH TWICE DAILY WITH MEALS  Qty: 180 tablet, Refills: 1    Associated Diagnoses: Type 2 diabetes mellitus with other specified complication, without long-term current use of insulin (HCC)      Cholecalciferol (VITAMIN D) 50 MCG (2000 UT) CAPS capsule Take 1 capsule by mouth daily      hydroCHLOROthiazide

## 2024-06-05 NOTE — H&P
Hospitalist Admission Note    NAME:   Katie Leon   : 1960   MRN: [id number]     Date/Time: 2024 1:10 PM    Patient PCP: Irasema Mejia MD    ______________________________________________________________________  Given the patient's current clinical presentation, I have a high level of concern for decompensation if discharged from the emergency department.  Complex decision making was performed, which includes reviewing the patient's available past medical records, laboratory results, and x-ray films.       My assessment of this patient's clinical condition and my plan of care is as follows.    Assessment / Plan:    Bright red bleeding per rectum:  -Gastroenterology consulted.  -Colonoscopy tomorrow.  -Clear liquid diet today.  -N.p.o. at midnight.  -CT abdomen results reviewed    Diabetes mellitus type 2:  -Hold metformin.  Resume on discharge.  -Continue sliding scale insulin.  -Weekly Mounjaro    Essential hypertension:  -Hold losartan and hydrochlorothiazide.  -Resume on discharge.    Hyperlipidemia:  -Resume pravastatin on discharge.    Von Willebrand disease:  -Hematology was consulted.  -No further testing needed.  -Patient okay to proceed with procedure.    Psoriasis of the scalp:  -Otezla can be resumed on discharge.    Medical Decision Making:   I personally reviewed labs: cbc, bmp  I personally reviewed imaging:CT  I personally reviewed EKG:  Toxic drug monitoring: Hgb  Discussed case with: ED provider. After discussion I am in agreement that acuity of patient's medical condition necessitates hospital stay.    Code Status: Full  DVT Prophylaxis: None given active bleeding  Baseline: Functional and Independent    Subjective:   CHIEF COMPLAINT: rectal bleeding    HISTORY OF PRESENT ILLNESS:     She is a 63-year-old lady with past medical history significant for diabetes mellitus type 2, essential hypertension, hyperlipidemia, von Willebrand disease, psoriasis of the scalp  Hysterectomy. URINARY BLADDER: No mass or calculus. BONES: No destructive bone lesion. ABDOMINAL WALL: No mass or hernia. ADDITIONAL COMMENTS: N/A     Tiny focus of active extravasation at the splenic flexure of the colon. This was communicated to Dr. Muñoz by Dr. Nowak at 10:35am on 6-5-24.     _______________________________________________________________________    TOTAL TIME:  76 Minutes    Critical Care Provided     Minutes non procedure based    Signed: Saulo Rodrigues MD    Procedures: see electronic medical records for all procedures/Xrays and details which were not copied into this note but were reviewed prior to creation of Plan.

## 2024-06-06 ENCOUNTER — ANESTHESIA (OUTPATIENT)
Facility: HOSPITAL | Age: 64
End: 2024-06-06
Payer: COMMERCIAL

## 2024-06-06 ENCOUNTER — ANESTHESIA EVENT (OUTPATIENT)
Facility: HOSPITAL | Age: 64
End: 2024-06-06
Payer: COMMERCIAL

## 2024-06-06 PROBLEM — K92.2 LOWER GI BLEED: Status: RESOLVED | Noted: 2024-06-05 | Resolved: 2024-06-06

## 2024-06-06 LAB
ANION GAP SERPL CALC-SCNC: 8 MMOL/L (ref 5–15)
BUN SERPL-MCNC: 11 MG/DL (ref 6–20)
BUN/CREAT SERPL: 13 (ref 12–20)
CALCIUM SERPL-MCNC: 8.8 MG/DL (ref 8.5–10.1)
CHLORIDE SERPL-SCNC: 101 MMOL/L (ref 97–108)
CO2 SERPL-SCNC: 28 MMOL/L (ref 21–32)
CREAT SERPL-MCNC: 0.88 MG/DL (ref 0.55–1.02)
ERYTHROCYTE [DISTWIDTH] IN BLOOD BY AUTOMATED COUNT: 13.7 % (ref 11.5–14.5)
GLUCOSE BLD STRIP.AUTO-MCNC: 119 MG/DL (ref 65–117)
GLUCOSE BLD STRIP.AUTO-MCNC: 126 MG/DL (ref 65–117)
GLUCOSE BLD STRIP.AUTO-MCNC: 127 MG/DL (ref 65–117)
GLUCOSE BLD STRIP.AUTO-MCNC: 130 MG/DL (ref 65–117)
GLUCOSE BLD STRIP.AUTO-MCNC: 132 MG/DL (ref 65–117)
GLUCOSE SERPL-MCNC: 113 MG/DL (ref 65–100)
HCT VFR BLD AUTO: 37.2 % (ref 35–47)
HCT VFR BLD AUTO: 40.5 % (ref 35–47)
HGB BLD-MCNC: 11.5 G/DL (ref 11.5–16)
HGB BLD-MCNC: 12.5 G/DL (ref 11.5–16)
MCH RBC QN AUTO: 24.2 PG (ref 26–34)
MCHC RBC AUTO-ENTMCNC: 30.9 G/DL (ref 30–36.5)
MCV RBC AUTO: 78.3 FL (ref 80–99)
NRBC # BLD: 0 K/UL (ref 0–0.01)
NRBC BLD-RTO: 0 PER 100 WBC
PLATELET # BLD AUTO: 282 K/UL (ref 150–400)
PMV BLD AUTO: 9.7 FL (ref 8.9–12.9)
POTASSIUM SERPL-SCNC: 3.1 MMOL/L (ref 3.5–5.1)
RBC # BLD AUTO: 5.17 M/UL (ref 3.8–5.2)
SERVICE CMNT-IMP: ABNORMAL
SODIUM SERPL-SCNC: 137 MMOL/L (ref 136–145)
WBC # BLD AUTO: 7.1 K/UL (ref 3.6–11)

## 2024-06-06 PROCEDURE — 3600007502: Performed by: INTERNAL MEDICINE

## 2024-06-06 PROCEDURE — 3600007512: Performed by: INTERNAL MEDICINE

## 2024-06-06 PROCEDURE — 2580000003 HC RX 258

## 2024-06-06 PROCEDURE — 0W3P8ZZ CONTROL BLEEDING IN GASTROINTESTINAL TRACT, VIA NATURAL OR ARTIFICIAL OPENING ENDOSCOPIC: ICD-10-PCS | Performed by: INTERNAL MEDICINE

## 2024-06-06 PROCEDURE — 80048 BASIC METABOLIC PNL TOTAL CA: CPT

## 2024-06-06 PROCEDURE — 3E0H8GC INTRODUCTION OF OTHER THERAPEUTIC SUBSTANCE INTO LOWER GI, VIA NATURAL OR ARTIFICIAL OPENING ENDOSCOPIC: ICD-10-PCS | Performed by: INTERNAL MEDICINE

## 2024-06-06 PROCEDURE — C1889 IMPLANT/INSERT DEVICE, NOC: HCPCS | Performed by: INTERNAL MEDICINE

## 2024-06-06 PROCEDURE — 6360000002 HC RX W HCPCS: Performed by: STUDENT IN AN ORGANIZED HEALTH CARE EDUCATION/TRAINING PROGRAM

## 2024-06-06 PROCEDURE — 7100000010 HC PHASE II RECOVERY - FIRST 15 MIN: Performed by: INTERNAL MEDICINE

## 2024-06-06 PROCEDURE — 3700000001 HC ADD 15 MINUTES (ANESTHESIA): Performed by: INTERNAL MEDICINE

## 2024-06-06 PROCEDURE — 7100000011 HC PHASE II RECOVERY - ADDTL 15 MIN: Performed by: INTERNAL MEDICINE

## 2024-06-06 PROCEDURE — 2500000003 HC RX 250 WO HCPCS

## 2024-06-06 PROCEDURE — 2709999900 HC NON-CHARGEABLE SUPPLY: Performed by: INTERNAL MEDICINE

## 2024-06-06 PROCEDURE — 6370000000 HC RX 637 (ALT 250 FOR IP): Performed by: STUDENT IN AN ORGANIZED HEALTH CARE EDUCATION/TRAINING PROGRAM

## 2024-06-06 PROCEDURE — 2580000003 HC RX 258: Performed by: STUDENT IN AN ORGANIZED HEALTH CARE EDUCATION/TRAINING PROGRAM

## 2024-06-06 PROCEDURE — 6360000002 HC RX W HCPCS: Performed by: INTERNAL MEDICINE

## 2024-06-06 PROCEDURE — 36415 COLL VENOUS BLD VENIPUNCTURE: CPT

## 2024-06-06 PROCEDURE — 3700000000 HC ANESTHESIA ATTENDED CARE: Performed by: INTERNAL MEDICINE

## 2024-06-06 PROCEDURE — 82962 GLUCOSE BLOOD TEST: CPT

## 2024-06-06 PROCEDURE — 85027 COMPLETE CBC AUTOMATED: CPT

## 2024-06-06 PROCEDURE — 2580000003 HC RX 258: Performed by: INTERNAL MEDICINE

## 2024-06-06 PROCEDURE — 6360000002 HC RX W HCPCS

## 2024-06-06 PROCEDURE — 1100000003 HC PRIVATE W/ TELEMETRY

## 2024-06-06 PROCEDURE — 85018 HEMOGLOBIN: CPT

## 2024-06-06 PROCEDURE — 85014 HEMATOCRIT: CPT

## 2024-06-06 DEVICE — WORKING LENGTH 235CM, WORKING CHANNEL 2.8MM
Type: IMPLANTABLE DEVICE | Site: OTHER | Status: FUNCTIONAL
Brand: RESOLUTION 360 CLIP

## 2024-06-06 DEVICE — CLIP
Type: IMPLANTABLE DEVICE | Site: OTHER | Status: FUNCTIONAL
Brand: RESOLUTION 360™ ULTRA CLIP

## 2024-06-06 RX ORDER — SODIUM CHLORIDE 9 MG/ML
INJECTION, SOLUTION INTRAVENOUS CONTINUOUS PRN
Status: DISCONTINUED | OUTPATIENT
Start: 2024-06-06 | End: 2024-06-06 | Stop reason: SDUPTHER

## 2024-06-06 RX ORDER — SODIUM CHLORIDE 0.9 % (FLUSH) 0.9 %
5-40 SYRINGE (ML) INJECTION EVERY 12 HOURS SCHEDULED
Status: DISCONTINUED | OUTPATIENT
Start: 2024-06-06 | End: 2024-06-06 | Stop reason: HOSPADM

## 2024-06-06 RX ORDER — PHENYLEPHRINE HCL IN 0.9% NACL 0.4MG/10ML
SYRINGE (ML) INTRAVENOUS PRN
Status: DISCONTINUED | OUTPATIENT
Start: 2024-06-06 | End: 2024-06-06 | Stop reason: SDUPTHER

## 2024-06-06 RX ORDER — SODIUM CHLORIDE 9 MG/ML
25 INJECTION, SOLUTION INTRAVENOUS PRN
Status: DISCONTINUED | OUTPATIENT
Start: 2024-06-06 | End: 2024-06-06 | Stop reason: HOSPADM

## 2024-06-06 RX ORDER — POTASSIUM CHLORIDE 7.45 MG/ML
10 INJECTION INTRAVENOUS
Status: DISCONTINUED | OUTPATIENT
Start: 2024-06-06 | End: 2024-06-06

## 2024-06-06 RX ORDER — POTASSIUM CHLORIDE 20 MEQ/1
40 TABLET, EXTENDED RELEASE ORAL ONCE
Status: COMPLETED | OUTPATIENT
Start: 2024-06-06 | End: 2024-06-06

## 2024-06-06 RX ORDER — EPINEPHRINE IN SOD CHLOR,ISO 1 MG/10 ML
SYRINGE (ML) INTRAVENOUS PRN
Status: DISCONTINUED | OUTPATIENT
Start: 2024-06-06 | End: 2024-06-06 | Stop reason: ALTCHOICE

## 2024-06-06 RX ORDER — SODIUM CHLORIDE 0.9 % (FLUSH) 0.9 %
5-40 SYRINGE (ML) INJECTION PRN
Status: DISCONTINUED | OUTPATIENT
Start: 2024-06-06 | End: 2024-06-06 | Stop reason: HOSPADM

## 2024-06-06 RX ORDER — POTASSIUM CHLORIDE 7.45 MG/ML
10 INJECTION INTRAVENOUS
Status: ACTIVE | OUTPATIENT
Start: 2024-06-06 | End: 2024-06-06

## 2024-06-06 RX ORDER — LIDOCAINE HYDROCHLORIDE 20 MG/ML
INJECTION, SOLUTION EPIDURAL; INFILTRATION; INTRACAUDAL; PERINEURAL PRN
Status: DISCONTINUED | OUTPATIENT
Start: 2024-06-06 | End: 2024-06-06 | Stop reason: SDUPTHER

## 2024-06-06 RX ADMIN — Medication 40 MCG: at 10:26

## 2024-06-06 RX ADMIN — PROPOFOL 20 MG: 10 INJECTION, EMULSION INTRAVENOUS at 10:59

## 2024-06-06 RX ADMIN — PROPOFOL 20 MG: 10 INJECTION, EMULSION INTRAVENOUS at 10:49

## 2024-06-06 RX ADMIN — SODIUM CHLORIDE: 900 INJECTION, SOLUTION INTRAVENOUS at 10:22

## 2024-06-06 RX ADMIN — PROPOFOL 20 MG: 10 INJECTION, EMULSION INTRAVENOUS at 10:56

## 2024-06-06 RX ADMIN — PROPOFOL 20 MG: 10 INJECTION, EMULSION INTRAVENOUS at 10:53

## 2024-06-06 RX ADMIN — SODIUM CHLORIDE, PRESERVATIVE FREE 10 ML: 5 INJECTION INTRAVENOUS at 21:38

## 2024-06-06 RX ADMIN — PROPOFOL 30 MG: 10 INJECTION, EMULSION INTRAVENOUS at 10:31

## 2024-06-06 RX ADMIN — PROPOFOL 50 MG: 10 INJECTION, EMULSION INTRAVENOUS at 10:23

## 2024-06-06 RX ADMIN — PROPOFOL 50 MG: 10 INJECTION, EMULSION INTRAVENOUS at 10:25

## 2024-06-06 RX ADMIN — PROPOFOL 30 MG: 10 INJECTION, EMULSION INTRAVENOUS at 10:37

## 2024-06-06 RX ADMIN — PROPOFOL 30 MG: 10 INJECTION, EMULSION INTRAVENOUS at 10:34

## 2024-06-06 RX ADMIN — SODIUM CHLORIDE 25 ML: 9 INJECTION, SOLUTION INTRAVENOUS at 09:14

## 2024-06-06 RX ADMIN — PROPOFOL 20 MG: 10 INJECTION, EMULSION INTRAVENOUS at 10:45

## 2024-06-06 RX ADMIN — ALPRAZOLAM 0.25 MG: 0.25 TABLET ORAL at 21:37

## 2024-06-06 RX ADMIN — PROPOFOL 20 MG: 10 INJECTION, EMULSION INTRAVENOUS at 10:51

## 2024-06-06 RX ADMIN — PROPOFOL 20 MG: 10 INJECTION, EMULSION INTRAVENOUS at 10:40

## 2024-06-06 RX ADMIN — POTASSIUM CHLORIDE 40 MEQ: 1500 TABLET, EXTENDED RELEASE ORAL at 14:19

## 2024-06-06 RX ADMIN — LIDOCAINE HYDROCHLORIDE 40 MG: 20 INJECTION, SOLUTION EPIDURAL; INFILTRATION; INTRACAUDAL; PERINEURAL at 10:23

## 2024-06-06 RX ADMIN — PROPOFOL 20 MG: 10 INJECTION, EMULSION INTRAVENOUS at 10:43

## 2024-06-06 RX ADMIN — PROPOFOL 20 MG: 10 INJECTION, EMULSION INTRAVENOUS at 11:01

## 2024-06-06 RX ADMIN — POTASSIUM CHLORIDE 10 MEQ: 7.46 INJECTION, SOLUTION INTRAVENOUS at 12:36

## 2024-06-06 RX ADMIN — PROPOFOL 10 MG: 10 INJECTION, EMULSION INTRAVENOUS at 11:04

## 2024-06-06 RX ADMIN — PROPOFOL 20 MG: 10 INJECTION, EMULSION INTRAVENOUS at 10:28

## 2024-06-06 RX ADMIN — ACETAMINOPHEN 650 MG: 325 TABLET ORAL at 12:39

## 2024-06-06 RX ADMIN — PROPOFOL 20 MG: 10 INJECTION, EMULSION INTRAVENOUS at 10:38

## 2024-06-06 RX ADMIN — PROPOFOL 20 MG: 10 INJECTION, EMULSION INTRAVENOUS at 10:46

## 2024-06-06 RX ADMIN — ACETAMINOPHEN 650 MG: 325 TABLET ORAL at 21:37

## 2024-06-06 RX ADMIN — PROPOFOL 20 MG: 10 INJECTION, EMULSION INTRAVENOUS at 10:47

## 2024-06-06 ASSESSMENT — PAIN SCALES - GENERAL
PAINLEVEL_OUTOF10: 1
PAINLEVEL_OUTOF10: 7
PAINLEVEL_OUTOF10: 8

## 2024-06-06 ASSESSMENT — PAIN DESCRIPTION - DESCRIPTORS
DESCRIPTORS: ACHING
DESCRIPTORS: ACHING

## 2024-06-06 ASSESSMENT — PAIN DESCRIPTION - LOCATION
LOCATION: HEAD
LOCATION: HEAD

## 2024-06-06 ASSESSMENT — PAIN SCALES - WONG BAKER: WONGBAKER_NUMERICALRESPONSE: HURTS A LITTLE BIT

## 2024-06-06 NOTE — OP NOTE
Colonoscopy Procedure Note      Indications:  GI hemorrhage, s/p polypectomy     :  Ariel Rg MD    Staff: Circulator: Suzanne Mohr RN  Surgical Assistant: Reinier Ace    Referring Provider: Irasema Mejia MD    Sedation:  MAC anesthesia Propofol    Procedure Details:  After informed consent was obtained with all risks and benefits of procedure explained and preoperative exam completed, the patient was taken to the endoscopy suite and placed in the left lateral decubitus position.  Upon sequential sedation as per above, a digital rectal exam was performed per below.  The Olympus videocolonoscope was inserted in the rectum and carefully advanced to the cecum, which was identified by the ileocecal valve and appendiceal orifice, terminal ileum.  The quality of preparation was  good BPS 2/2/2 6 .  The colonoscope was slowly withdrawn with careful evaluation between folds. Retroflexion in the rectum was performed.     Findings: Entire colon from rectum to cecum filled with maroon stool and clots, lavaged extensively with good visualization.  SYD: small hemorrhoids  Rectum: grade 1 medium internal hemorrhoids on retroflexion, otherwise unremarkable  Sigmoid: normal  no mucosal lesion appreciated  Descending Colon: one 2mm sessile polyp NOT removed due to active GI bleed  Splenic flexure / very distal transverse colon: one 15mm cratered ulcer with adherent clot and active red blood oozing from beneath the large clot. 3cc of 1:10,000 epinephrine injected in the ulcer base in quadrants with excellent blanching. Snare utilized to carefully and in piecemeal fashion cut off the clot revealing an oozing blood vessel despite the epinephrine injection. Two BS-Ultraclips placed with cessation of bleeding and to close the ulcer. Two BS-resolution clips utilized to complete the closure. There was no bleeding after clipping and also no bleeding after scope tip manipulation and lavage and

## 2024-06-06 NOTE — PROGRESS NOTES
VITALS:   Last 24hrs VS reviewed since prior progress note. Most recent are:  Patient Vitals for the past 24 hrs:   BP Temp Temp src Pulse Resp SpO2   06/06/24 1222 122/62 -- Oral 65 16 98 %   06/06/24 1146 (!) 127/51 -- -- 60 13 97 %   06/06/24 1144 (!) 125/51 -- -- 59 13 98 %   06/06/24 1140 (!) 133/51 -- -- 65 12 98 %   06/06/24 1138 (!) 129/50 -- -- 64 12 97 %   06/06/24 1128 (!) 112/50 -- -- 77 (!) 9 98 %   06/06/24 1122 (!) 116/56 -- -- 76 15 99 %   06/06/24 1120 (!) 106/36 -- -- 81 18 98 %   06/06/24 1107 (!) 97/41 -- -- 93 16 96 %   06/06/24 0906 132/63 -- -- 80 15 97 %   06/06/24 0737 119/60 98.1 °F (36.7 °C) Oral 72 18 94 %   06/06/24 0219 134/66 97.9 °F (36.6 °C) -- 73 16 96 %   06/05/24 1932 (!) 114/59 97.9 °F (36.6 °C) -- 73 16 96 %   06/05/24 1554 (!) 104/91 98.2 °F (36.8 °C) Oral 68 16 94 %         Intake/Output Summary (Last 24 hours) at 6/6/2024 1344  Last data filed at 6/6/2024 1104  Gross per 24 hour   Intake 2800 ml   Output 1000 ml   Net 1800 ml        I had a face to face encounter and independently examined this patient on 6/6/2024, as outlined below:  PHYSICAL EXAM:  General: Alert, cooperative  EENT:  EOMI. Anicteric sclerae.  Resp:  CTA bilaterally, no wheezing or rales.  No accessory muscle use  CV:  Regular  rhythm,  No edema  GI:  Soft, Non distended, Non tender.  +Bowel sounds  Neurologic:  Alert and oriented X 3, normal speech,   Psych:   Good insight. Not anxious nor agitated  Skin:  No rashes.  No jaundice    Reviewed most current lab test results and cultures  YES  Reviewed most current radiology test results   YES  Review and summation of old records today    NO  Reviewed patient's current orders and MAR    YES  PMH/SH reviewed - no change compared to H&P    Procedures: see electronic medical records for all procedures/Xrays and details which were not copied into this note but were reviewed prior to creation of Plan.      LABS:  I reviewed today's most current labs and imaging  studies.  Pertinent labs include:  Recent Labs     06/05/24 0941 06/05/24 1940 06/06/24 0223   WBC 9.2  --  7.1   HGB 13.2 12.3 12.5   HCT 43.3 39.4 40.5     --  282     Recent Labs     06/05/24 0941 06/06/24 0223    137   K 3.5 3.1*    101   CO2 31 28   GLUCOSE 104* 113*   BUN 14 11   CREATININE 0.97 0.88   CALCIUM 9.1 8.8   BILITOT 0.4  --    AST 18  --    ALT 28  --        Signed: Pema Bowen MD

## 2024-06-06 NOTE — PROGRESS NOTES
End of Shift Note    Bedside shift change report given to PHILIP Goff (oncoming nurse) by Karmen Nolan RN (offgoing nurse).  Report included the following information SBAR, Intake/Output, Cardiac Rhythm NSR, and Quality Measures    Shift worked:  7a-7p     Shift summary and any significant changes:     Pt is alert and oriented x4. Pt is independent with ambulation without assistive device. Pt complained of Headache after coming back from colonoscopy. PRN medication given. Pt reported relief after tylenol. Pt voids to the bathroom. Pt needs attended. Due medications given as ordered. Pt denies pain and SOB at this time.     Concerns for physician to address:       Zone phone for oncoming shift:          Activity:     Number times ambulated in hallways past shift: 0  Number of times OOB to chair past shift: 5    Cardiac:   Cardiac Monitoring: Yes           Access:  Current line(s): PIV     Genitourinary:   Urinary status: voiding    Respiratory:      Chronic home O2 use?: NO  Incentive spirometer at bedside: NO       GI:     Current diet:  ADULT DIET; Regular; 5 carb choices (75 gm/meal); Low Fat/Low Chol/High Fiber/2 gm Na  Passing flatus: YES  Tolerating current diet: YES       Pain Management:   Patient states pain is manageable on current regimen: YES    Skin:     Interventions: turn team, specialty bed, float heels, and increase time out of bed    Patient Safety:  Fall Score:    Interventions: bed/chair alarm, assistive device (walker, cane. etc), gripper socks, pt to call before getting OOB, and stay with me (per policy)       Length of Stay:  Expected LOS: 2  Actual LOS: 1      Karmen Nolan RN

## 2024-06-06 NOTE — ANESTHESIA POSTPROCEDURE EVALUATION
Department of Anesthesiology  Postprocedure Note    Patient: Katie Leon  MRN: [id number]  YOB: 1960  Date of evaluation: 6/6/2024    Procedure Summary       Date: 06/06/24 Room / Location: [facility] ENDO 03 / [facility] ENDOSCOPY    Anesthesia Start: 1023 Anesthesia Stop: 1113    Procedures:       COLONOSCOPY SUBMUCOSAL/BOTOX INJECTION      COLONOSCOPY CONTROL HEMORRHAGE Diagnosis:       Rectal bleeding      Colon ulcer      GI bleed      Colon polyps      (Rectal bleeding [K62.5])    Surgeons: Ariel Rg MD Responsible Provider: Todd Sanchez MD    Anesthesia Type: MAC ASA Status: 3            Anesthesia Type: MAC    Brittany Phase I: Brittany Score: 10    Brittany Phase II: Brittany Score: 10    Anesthesia Post Evaluation    Patient location during evaluation: PACU  Patient participation: complete - patient participated  Level of consciousness: awake  Airway patency: patent  Nausea & Vomiting: no vomiting  Cardiovascular status: hemodynamically stable  Respiratory status: acceptable  Hydration status: euvolemic    No notable events documented.

## 2024-06-06 NOTE — PLAN OF CARE
Problem: ABCDS Injury Assessment  Goal: Absence of physical injury  6/5/2024 1340 by Ishaan Navarro LPN  Outcome: Progressing     Problem: Chronic Conditions and Co-morbidities  Goal: Patient's chronic conditions and co-morbidity symptoms are monitored and maintained or improved  Outcome: Progressing     Problem: Safety - Adult  Goal: Free from fall injury  Outcome: Progressing       Pt completed bowel prep in preparation for procedure this morning. Bowels watery but bloody. C/O headache this shift, PRN tylenol was administered with good effect.

## 2024-06-06 NOTE — PROGRESS NOTES
Seen and examined in endoscopy prior to procedure. Respirations are not labored. Heart rate is normal. Abdomen is obese, soft and is not tender. All questions answered. Proceed with procedure.

## 2024-06-06 NOTE — ANESTHESIA PRE PROCEDURE
Department of Anesthesiology  Preprocedure Note       Name:  Katie Leon   Age:  63 y.o.  :  1960                                          MRN:  [id number]         Date:  2024      Surgeon: Surgeon(s):  Ariel Rg MD    Procedure: Procedure(s):  COLONOSCOPY BIOPSY    Medications prior to admission:   Prior to Admission medications    Medication Sig Start Date End Date Taking? Authorizing Provider   Tirzepatide (MOUNJARO) 2.5 MG/0.5ML SOPN SC injection Inject 0.5 mg into the skin once a week Saturday   Yes Castro German MD   ALPRAZolam (XANAX) 0.25 MG tablet Take by mouth nightly as needed for Sleep. Take 1-3 tablets   Yes Castro German MD   Lactobacillus (PROBIOTIC ACIDOPHILUS PO) Take 1 tablet by mouth daily   Yes Castro German MD   losartan (COZAAR) 50 MG tablet TAKE 1 TABLET BY MOUTH DAILY 24   Irasema Mejia MD   metFORMIN (GLUCOPHAGE) 1000 MG tablet TAKE 1 TABLET BY MOUTH TWICE DAILY WITH MEALS 3/12/24   Irasema Mejia MD   Cholecalciferol (VITAMIN D) 50 MCG ( UT) CAPS capsule Take 1 capsule by mouth daily    Castro German MD   hydroCHLOROthiazide (HYDRODIURIL) 12.5 MG tablet Take 1 tablet by mouth daily 10/9/23   Irasema Mejia MD   pravastatin (PRAVACHOL) 20 MG tablet Take 1 tablet by mouth nightly 10/9/23   Irasema Mejia MD   Apremilast (OTEZLA) 30 MG TABS Take 1 tablet by mouth 2 times daily 19   Automatic Reconciliation, Ar       Current medications:    Current Facility-Administered Medications   Medication Dose Route Frequency Provider Last Rate Last Admin    sodium chloride flush 0.9 % injection 5-40 mL  5-40 mL IntraVENous 2 times per day Ariel Rg MD        sodium chloride flush 0.9 % injection 5-40 mL  5-40 mL IntraVENous PRN Ariel Rg MD        0.9 % sodium chloride infusion  25 mL IntraVENous PRN Ariel Rg  mL/hr at 24 0914 25 mL at 24 0914    potassium

## 2024-06-06 NOTE — PLAN OF CARE
Problem: Discharge Planning  Goal: Discharge to home or other facility with appropriate resources  Outcome: Progressing     Problem: ABCDS Injury Assessment  Goal: Absence of physical injury  Outcome: Progressing     Problem: Chronic Conditions and Co-morbidities  Goal: Patient's chronic conditions and co-morbidity symptoms are monitored and maintained or improved  6/6/2024 1600 by Karmen Nolan RN  Outcome: Progressing  6/6/2024 0336 by Madelin Kern RN  Outcome: Progressing     Problem: Safety - Adult  Goal: Free from fall injury  6/6/2024 1600 by Karmen Nolan RN  Outcome: Progressing  6/6/2024 0336 by Madelin Kern RN  Outcome: Progressing

## 2024-06-06 NOTE — PROGRESS NOTES
1530 - PCP hospital follow-up transitional care appointment has been scheduled with Dr. Irasema Mejia on 6/14/24 1600. [facility] placed [facility] information AVS for patient resource.   Pending patient discharge.  Diandra Quintero Care Management Assistant     Attempted to schedule PCP hospital follow up. Sent PCP office a message, awaiting return call from PCP office with appt information. [facility] placed [facility] information AVS for patient resource.  Pending patient discharge. Diandra Quintero Care Management Assistant

## 2024-06-07 VITALS
BODY MASS INDEX: 44.25 KG/M2 | OXYGEN SATURATION: 98 % | HEIGHT: 61 IN | WEIGHT: 234.35 LBS | SYSTOLIC BLOOD PRESSURE: 127 MMHG | TEMPERATURE: 97.9 F | DIASTOLIC BLOOD PRESSURE: 59 MMHG | HEART RATE: 66 BPM | RESPIRATION RATE: 17 BRPM

## 2024-06-07 LAB
ANION GAP SERPL CALC-SCNC: 5 MMOL/L (ref 5–15)
BUN SERPL-MCNC: 12 MG/DL (ref 6–20)
BUN/CREAT SERPL: 14 (ref 12–20)
CALCIUM SERPL-MCNC: 8.7 MG/DL (ref 8.5–10.1)
CHLORIDE SERPL-SCNC: 103 MMOL/L (ref 97–108)
CO2 SERPL-SCNC: 28 MMOL/L (ref 21–32)
CREAT SERPL-MCNC: 0.84 MG/DL (ref 0.55–1.02)
GLUCOSE BLD STRIP.AUTO-MCNC: 129 MG/DL (ref 65–117)
GLUCOSE SERPL-MCNC: 110 MG/DL (ref 65–100)
HCT VFR BLD AUTO: 39.2 % (ref 35–47)
HGB BLD-MCNC: 11.8 G/DL (ref 11.5–16)
POTASSIUM SERPL-SCNC: 3.5 MMOL/L (ref 3.5–5.1)
SERVICE CMNT-IMP: ABNORMAL
SODIUM SERPL-SCNC: 136 MMOL/L (ref 136–145)

## 2024-06-07 PROCEDURE — 80048 BASIC METABOLIC PNL TOTAL CA: CPT

## 2024-06-07 PROCEDURE — 36415 COLL VENOUS BLD VENIPUNCTURE: CPT

## 2024-06-07 PROCEDURE — 85018 HEMOGLOBIN: CPT

## 2024-06-07 PROCEDURE — 82962 GLUCOSE BLOOD TEST: CPT

## 2024-06-07 PROCEDURE — 85014 HEMATOCRIT: CPT

## 2024-06-07 PROCEDURE — 2580000003 HC RX 258: Performed by: STUDENT IN AN ORGANIZED HEALTH CARE EDUCATION/TRAINING PROGRAM

## 2024-06-07 PROCEDURE — 6370000000 HC RX 637 (ALT 250 FOR IP): Performed by: STUDENT IN AN ORGANIZED HEALTH CARE EDUCATION/TRAINING PROGRAM

## 2024-06-07 RX ORDER — POTASSIUM CHLORIDE 20 MEQ/1
40 TABLET, EXTENDED RELEASE ORAL ONCE
Status: COMPLETED | OUTPATIENT
Start: 2024-06-07 | End: 2024-06-07

## 2024-06-07 RX ADMIN — SODIUM CHLORIDE, PRESERVATIVE FREE 10 ML: 5 INJECTION INTRAVENOUS at 09:14

## 2024-06-07 RX ADMIN — POTASSIUM CHLORIDE 40 MEQ: 1500 TABLET, EXTENDED RELEASE ORAL at 09:12

## 2024-06-07 ASSESSMENT — PAIN SCALES - WONG BAKER: WONGBAKER_NUMERICALRESPONSE: NO HURT

## 2024-06-07 ASSESSMENT — PAIN SCALES - GENERAL: PAINLEVEL_OUTOF10: 0

## 2024-06-07 NOTE — PROGRESS NOTES
Alona Rice PA-C                       [phone] cell                      Friday 7:30 am- 4:30 pm         GI PROGRESS NOTE        NAME:   Katie Leon       :    1960       MRN:    [id number]     Assessment/Plan     2024 GI consultation for rectal bleeding after colonoscopy 5 days ago with a polyp removed from the distal transverse colon.  63-year-old female with PMH of von Willebrand's disease, T2DM, HTN, and HLD.  Underwent colonoscopy 2024.  Small amount of bleeding 2 days after colonoscopy and then for the past 2 nights that she has been awakened with a sensation her bowels need to move which is followed by [facility], she had 3 episodes early this morning.  No abdominal pain or nausea.  She does not take any blood thinners.  She sees Dr. Marin for her von Willebrand's disease.  Hgb is good.  CT with tiny area of active extravasation at splenic flexure.      Impression:  Rectal bleeding after polypectomy  Von Willebrand's disease  T2DM  HTN  HLD     2024 s/p colonoscopy by Dr. Rg   Findings: Entire colon from rectum to cecum filled with maroon stool and clots, lavaged extensively with good visualization.  SYD: small hemorrhoids  Rectum: grade 1 medium internal hemorrhoids on retroflexion, otherwise unremarkable  Sigmoid: normal  no mucosal lesion appreciated  Descending Colon: one 2mm sessile polyp NOT removed due to active GI bleed  Splenic flexure / very distal transverse colon: one 15mm cratered ulcer with adherent clot and active red blood oozing from beneath the large clot. 3cc of 1:10,000 epinephrine injected in the ulcer base in quadrants with excellent blanching. Snare utilized to carefully and in piecemeal fashion cut off the clot revealing an oozing blood vessel despite the epinephrine injection. Two BS-Ultraclips placed with cessation of bleeding and to close the  automatic exposure control for dose modulation.    FINDINGS:  LOWER THORAX: No significant abnormality in the incidentally imaged lower chest.  LIVER: No mass.  BILIARY TREE: Gallbladder is within normal limits. CBD is not dilated.  SPLEEN: within normal limits.  PANCREAS: No mass or ductal dilatation.  ADRENALS: Unremarkable.  KIDNEYS: No mass, calculus, or hydronephrosis.  STOMACH: Unremarkable.  SMALL BOWEL: No dilatation or wall thickening.  COLON: There is a very tiny focus of active extravasation at the splenic  flexure.  APPENDIX: Unremarkable.  PERITONEUM: No ascites or pneumoperitoneum.  RETROPERITONEUM: No lymphadenopathy or aortic aneurysm.  REPRODUCTIVE ORGANS: Hysterectomy.  URINARY BLADDER: No mass or calculus.  BONES: No destructive bone lesion.  ABDOMINAL WALL: No mass or hernia.  ADDITIONAL COMMENTS: N/A    Impression  Tiny focus of active extravasation at the splenic flexure of the colon.    This was communicated to Dr. Muñoz by Dr. Nowak at 10:35am on 6-5-24.          Medications Reviewed    PMH/SH reviewed - no change compared to H&P  Date/Time:  6/7/2024

## 2024-06-07 NOTE — DISCHARGE INSTRUCTIONS
HOSPITALIST DISCHARGE INSTRUCTIONS    NAME: Katie Leon   :  1960   MRN:  [id number]     Date/Time:  2024 12:03 PM    ADMIT DATE: 2024     DISCHARGE DATE: 2024     DISCHARGE DIAGNOSIS:  Bright red bleeding per rectum likely from cratered ulcer in splenic flexure /very distal transverse colon  S/p polyp removal on May     MEDICATIONS:  As per medication reconciliation  list  It is important that you take the medication exactly as they are prescribed.   Keep your medication in the bottles provided by the pharmacist and keep a list of the medication names, dosages, and times to be taken in your wallet.   Do not take other medications without consulting your doctor.     Pain Management: per above medications    What to do at Home:  Follow up with Dr Salazar as scheduled for repeat colonoscopy   If excessive bleeding from rectum follow up in ED      Recommended diet:  diabetic diet    Recommended activity: activity as tolerated    If you have questions regarding the hospital related prescriptions or hospital related issues please call at .    If you experience any of the following symptoms then please call your primary care physician or return to the emergency room if you cannot get hold of your doctor:  Fever, chills, nausea, vomiting, diarrhea, change in mentation, falling, bleeding, shortness of breath    Follow Up:   @PCP@  you are to call and set up an appointment to see them in 7-10 days.      Information obtained by :  I understand that if any problems occur once I am at home I am to contact my physician.    I understand and acknowledge receipt of the instructions indicated above.                                                                                                                                           Physician's or R.N.'s Signature                                                                  Date/Time

## 2024-06-07 NOTE — PLAN OF CARE
Problem: Discharge Planning  Goal: Discharge to home or other facility with appropriate resources  6/7/2024 1210 by Megan Campos RN  Outcome: Adequate for Discharge  6/7/2024 1042 by Megan Campos RN  Outcome: Progressing  Flowsheets (Taken 6/7/2024 0821)  Discharge to home or other facility with appropriate resources:   Identify barriers to discharge with patient and caregiver   Arrange for needed discharge resources and transportation as appropriate   Identify discharge learning needs (meds, wound care, etc)     Problem: ABCDS Injury Assessment  Goal: Absence of physical injury  6/7/2024 1210 by Megan Campos RN  Outcome: Adequate for Discharge  6/7/2024 1042 by Megan Campos RN  Outcome: Progressing     Problem: Chronic Conditions and Co-morbidities  Goal: Patient's chronic conditions and co-morbidity symptoms are monitored and maintained or improved  6/7/2024 1210 by Megan Campos RN  Outcome: Adequate for Discharge  6/7/2024 1042 by Megan Campos RN  Outcome: Progressing  Flowsheets (Taken 6/7/2024 0821)  Care Plan - Patient's Chronic Conditions and Co-Morbidity Symptoms are Monitored and Maintained or Improved:   Monitor and assess patient's chronic conditions and comorbid symptoms for stability, deterioration, or improvement   Collaborate with multidisciplinary team to address chronic and comorbid conditions and prevent exacerbation or deterioration   Update acute care plan with appropriate goals if chronic or comorbid symptoms are exacerbated and prevent overall improvement and discharge     Problem: Safety - Adult  Goal: Free from fall injury  6/7/2024 1210 by Megan Campos RN  Outcome: Adequate for Discharge  6/7/2024 1042 by Megan Campos RN  Outcome: Progressing  Flowsheets (Taken 6/7/2024 0821)  Free From Fall Injury: Instruct family/caregiver on patient safety     Problem: Pain  Goal: Verbalizes/displays adequate comfort level or baseline comfort  level  6/7/2024 1210 by Megan Campos, RN  Outcome: Adequate for Discharge  6/7/2024 1042 by Megan Campos, RN  Outcome: Progressing  Flowsheets (Taken 6/7/2024 0821)  Verbalizes/displays adequate comfort level or baseline comfort level:   Encourage patient to monitor pain and request assistance   Assess pain using appropriate pain scale   Administer analgesics based on type and severity of pain and evaluate response   Implement non-pharmacological measures as appropriate and evaluate response

## 2024-06-07 NOTE — CARE COORDINATION
Transition of Care Plan:    RUR: 6%  Prior Level of Functioning:   Disposition: Home w/family  If SNF or IPR: Date FOC offered:   Date FOC received:   Accepting facility:   Date authorization started with reference number:   Date authorization received and expires:   Follow up appointments: on AVS  DME needed: n/a  Transportation at discharge:   IM/IMM Medicare/ letter given: n/a  Is patient a [place] and connected with [facility]?    If yes, was  transfer form completed and [facility] notified?   Caregiver Contact:   Discharge Caregiver contacted prior to discharge? Pt will contact  Care Conference needed?   Barriers to discharge:     Patient is d/c home today without any needs or concerns.    Suzanne Ferreira  Ext [phone]

## 2024-06-07 NOTE — DISCHARGE SUMMARY
Discharge Summary    Name: Katie Leon  [id number]  YOB: 1960 (Age: 63 y.o.)   Date of Admission: 6/5/2024  Date of Discharge: 6/7/2024  Attending Physician: Pema Bowen MD    Discharge Diagnosis:   Bright red bleeding per rectum likely from cratered ulcer in splenic flexure /very distal transverse colon   Hypokalemia -resolved   Diabetes mellitus type 2  hypertension   Hyperlipidemia  Von Willebrand disease   Psoriasis of the scalp       Consultations:  IP CONSULT TO HEMATOLOGY  IP CONSULT TO HOSPITALIST      Brief Admission History/Reason for Admission Per Saulo Rodrigues MD:   She is a 63-year-old lady with past medical history significant for diabetes mellitus type 2, essential hypertension, hyperlipidemia, von Willebrand disease, psoriasis of the scalp presented to the hospital complaining of multiple episodes of rectal bleeding.  Patient recently underwent colonoscopy on 5/31.     In the ER, hemoglobin stable.  Vital signs stable.  Gastroenterology evaluated the patient and recommended repeat colonoscopy tomorrow.  CT abdomen showed tiny focus of active extravasation at the splenic flexure of the colon    Brief Hospital Course by Main Problems:   Bright red bleeding per rectum likely from cratered ulcer in splenic flexure /very distal transverse colon  -S/p colonoscopy on 6/6   Findings: Rectum: grade 1 medium internal hemorrhoids on retroflexion. Splenic flexure / very distal transverse colon: one 15mm cratered ulcer with adherent clot and active red blood oozing from beneath the large clot - S/p Epi injection and Two BS-resolution clips Cecum: one 3mm sessile polyp NOT removed due to active GI bleed      -Tolerated regular diet, Hb stable at 11.8. No further visible gi bleeds. Evaluated by gi , needs to follow up with Dr Salazar for repeat colonoscopy as outpatient.       Hypokalemia -resolved   -Potassium supplemented      Diabetes mellitus

## 2024-06-07 NOTE — PLAN OF CARE
Problem: Discharge Planning  Goal: Discharge to home or other facility with appropriate resources  Outcome: Progressing  Flowsheets (Taken 6/7/2024 0821)  Discharge to home or other facility with appropriate resources:   Identify barriers to discharge with patient and caregiver   Arrange for needed discharge resources and transportation as appropriate   Identify discharge learning needs (meds, wound care, etc)     Problem: ABCDS Injury Assessment  Goal: Absence of physical injury  Outcome: Progressing     Problem: Chronic Conditions and Co-morbidities  Goal: Patient's chronic conditions and co-morbidity symptoms are monitored and maintained or improved  Outcome: Progressing  Flowsheets (Taken 6/7/2024 0821)  Care Plan - Patient's Chronic Conditions and Co-Morbidity Symptoms are Monitored and Maintained or Improved:   Monitor and assess patient's chronic conditions and comorbid symptoms for stability, deterioration, or improvement   Collaborate with multidisciplinary team to address chronic and comorbid conditions and prevent exacerbation or deterioration   Update acute care plan with appropriate goals if chronic or comorbid symptoms are exacerbated and prevent overall improvement and discharge     Problem: Safety - Adult  Goal: Free from fall injury  Outcome: Progressing  Flowsheets (Taken 6/7/2024 0821)  Free From Fall Injury: Instruct family/caregiver on patient safety     Problem: Pain  Goal: Verbalizes/displays adequate comfort level or baseline comfort level  Outcome: Progressing  Flowsheets (Taken 6/7/2024 0821)  Verbalizes/displays adequate comfort level or baseline comfort level:   Encourage patient to monitor pain and request assistance   Assess pain using appropriate pain scale   Administer analgesics based on type and severity of pain and evaluate response   Implement non-pharmacological measures as appropriate and evaluate response

## 2024-06-11 PROBLEM — K62.5 RECTAL BLEEDING: Status: ACTIVE | Noted: 2024-06-11

## 2024-06-13 NOTE — PROGRESS NOTES
Katie Leon is a 63 y.o. female who presents for hospital follow-up    Had colonoscopy on 5/31, polyp removal.  Bleeding start 3/3.      Hospitalized 6/5-6/7 with rectal bleeding from transverse colon ulcer.  Colonoscopy 6/6.  Also, found to have a sessile cecal polyp, which will be addressed with GI as an outpatient.  History of von Willebrand's disease.  Was seen by hematology during hospitalization.  Hemoglobin 11.8 at hospital discharge.  Rectal bleeding stopped during hospitalization and has not recurred.  Normal BM, no BRBPR or melena. Some bloating, able to pass gas.  No nausea.     HbA1C 6.6% June 5.  On mounjaro 2.5mg weekly.         Past Medical History:   Diagnosis Date    Achilles tendinitis     Arrhythmia     heart murmur    Chronic pain     Contact dermatitis and other eczema, due to unspecified cause     Diabetes (HCC)     GERD (gastroesophageal reflux disease)     Heel spur, right     Hepatic steatosis     Hypertension     Joint pain     & muscle aches    Migraine     Nephrolithiasis     Obesity     Plantar fasciitis of right foot     Sleep apnea     appiah not use c-pap    Von Willebrand disease (HCC)        Family History   Problem Relation Age of Onset    Hypertension Father     Stroke Father     Anemia Mother     Hearing Loss Mother         Social History     Socioeconomic History    Marital status:      Spouse name: Not on file    Number of children: Not on file    Years of education: Not on file    Highest education level: Not on file   Occupational History    Not on file   Tobacco Use    Smoking status: Never    Smokeless tobacco: Never   Substance and Sexual Activity    Alcohol use: Yes     Alcohol/week: 2.0 standard drinks of alcohol     Types: 2 Glasses of wine per week    Drug use: Never    Sexual activity: Not Currently     Partners: Male   Other Topics Concern    Not on file   Social History Narrative    Not on file     Social Determinants of Health     Financial Resource

## 2024-06-14 ENCOUNTER — OFFICE VISIT (OUTPATIENT)
Age: 64
End: 2024-06-14

## 2024-06-14 VITALS
OXYGEN SATURATION: 98 % | BODY MASS INDEX: 43.99 KG/M2 | TEMPERATURE: 97.2 F | HEIGHT: 61 IN | HEART RATE: 108 BPM | RESPIRATION RATE: 16 BRPM | WEIGHT: 233 LBS | DIASTOLIC BLOOD PRESSURE: 64 MMHG | SYSTOLIC BLOOD PRESSURE: 95 MMHG

## 2024-06-14 DIAGNOSIS — K62.5 RECTAL BLEEDING: ICD-10-CM

## 2024-06-14 DIAGNOSIS — E78.00 PURE HYPERCHOLESTEROLEMIA: ICD-10-CM

## 2024-06-14 DIAGNOSIS — I10 ESSENTIAL HYPERTENSION, BENIGN: ICD-10-CM

## 2024-06-14 DIAGNOSIS — E11.9 TYPE 2 DIABETES MELLITUS WITHOUT COMPLICATION, WITHOUT LONG-TERM CURRENT USE OF INSULIN (HCC): ICD-10-CM

## 2024-06-14 DIAGNOSIS — E66.01 CLASS 3 SEVERE OBESITY WITHOUT SERIOUS COMORBIDITY WITH BODY MASS INDEX (BMI) OF 40.0 TO 44.9 IN ADULT, UNSPECIFIED OBESITY TYPE (HCC): ICD-10-CM

## 2024-06-14 DIAGNOSIS — K63.3 COLON ULCER: Primary | ICD-10-CM

## 2024-06-14 RX ORDER — TIRZEPATIDE 2.5 MG/.5ML
2.5 INJECTION, SOLUTION SUBCUTANEOUS WEEKLY
Qty: 2 ML | Refills: 1 | Status: SHIPPED | OUTPATIENT
Start: 2024-06-14

## 2024-06-14 NOTE — PROGRESS NOTES
\"Have you been to the ER, urgent care clinic since your last visit?  Hospitalized since your last visit?\"    [facility] 06/05/2024 Lower GI bleed    “Have you seen or consulted any other health care providers outside of [facility] since your last visit?”    NO            Click Here for Release of Records Request

## 2024-07-11 DIAGNOSIS — E66.01 CLASS 3 SEVERE OBESITY WITHOUT SERIOUS COMORBIDITY WITH BODY MASS INDEX (BMI) OF 40.0 TO 44.9 IN ADULT, UNSPECIFIED OBESITY TYPE (HCC): ICD-10-CM

## 2024-07-11 RX ORDER — TIRZEPATIDE 2.5 MG/.5ML
2.5 INJECTION, SOLUTION SUBCUTANEOUS WEEKLY
Qty: 2 ML | Refills: 1 | Status: CANCELLED | OUTPATIENT
Start: 2024-07-11

## 2024-07-11 RX ORDER — TIRZEPATIDE 5 MG/.5ML
5 INJECTION, SOLUTION SUBCUTANEOUS WEEKLY
Qty: 2 ML | Refills: 2 | Status: SHIPPED | OUTPATIENT
Start: 2024-07-11

## 2024-08-01 DIAGNOSIS — F41.9 ANXIETY: Primary | ICD-10-CM

## 2024-08-02 RX ORDER — ALPRAZOLAM 0.25 MG/1
0.25 TABLET ORAL 2 TIMES DAILY PRN
Qty: 30 TABLET | Refills: 0 | Status: SHIPPED | OUTPATIENT
Start: 2024-08-02 | End: 2024-09-01

## 2024-08-22 RX ORDER — METFORMIN HYDROCHLORIDE 500 MG/1
1000 TABLET, EXTENDED RELEASE ORAL 2 TIMES DAILY
Qty: 120 TABLET | Refills: 5 | OUTPATIENT
Start: 2024-08-22

## 2024-09-09 ENCOUNTER — TELEPHONE (OUTPATIENT)
Age: 64
End: 2024-09-09

## 2024-09-09 ENCOUNTER — OFFICE VISIT (OUTPATIENT)
Age: 64
End: 2024-09-09
Payer: COMMERCIAL

## 2024-09-09 VITALS
BODY MASS INDEX: 41.12 KG/M2 | RESPIRATION RATE: 18 BRPM | HEART RATE: 82 BPM | HEIGHT: 61 IN | WEIGHT: 217.8 LBS | TEMPERATURE: 97.3 F | OXYGEN SATURATION: 99 % | SYSTOLIC BLOOD PRESSURE: 110 MMHG | DIASTOLIC BLOOD PRESSURE: 68 MMHG

## 2024-09-09 DIAGNOSIS — E11.9 TYPE 2 DIABETES MELLITUS WITHOUT COMPLICATION, WITHOUT LONG-TERM CURRENT USE OF INSULIN (HCC): Primary | ICD-10-CM

## 2024-09-09 DIAGNOSIS — I10 ESSENTIAL HYPERTENSION, BENIGN: ICD-10-CM

## 2024-09-09 DIAGNOSIS — E78.00 PURE HYPERCHOLESTEROLEMIA: ICD-10-CM

## 2024-09-09 DIAGNOSIS — Z00.00 ROUTINE MEDICAL EXAM: ICD-10-CM

## 2024-09-09 DIAGNOSIS — E66.01 CLASS 3 SEVERE OBESITY WITHOUT SERIOUS COMORBIDITY WITH BODY MASS INDEX (BMI) OF 40.0 TO 44.9 IN ADULT, UNSPECIFIED OBESITY TYPE (HCC): ICD-10-CM

## 2024-09-09 LAB — HBA1C MFR BLD: 6 %

## 2024-09-09 PROCEDURE — 3078F DIAST BP <80 MM HG: CPT | Performed by: FAMILY MEDICINE

## 2024-09-09 PROCEDURE — 99214 OFFICE O/P EST MOD 30 MIN: CPT | Performed by: FAMILY MEDICINE

## 2024-09-09 PROCEDURE — 3044F HG A1C LEVEL LT 7.0%: CPT | Performed by: FAMILY MEDICINE

## 2024-09-09 PROCEDURE — 3074F SYST BP LT 130 MM HG: CPT | Performed by: FAMILY MEDICINE

## 2024-09-09 PROCEDURE — 83036 HEMOGLOBIN GLYCOSYLATED A1C: CPT | Performed by: FAMILY MEDICINE

## 2024-09-09 RX ORDER — TIRZEPATIDE 5 MG/.5ML
5 INJECTION, SOLUTION SUBCUTANEOUS WEEKLY
Qty: 2 ML | Refills: 2 | Status: SHIPPED | OUTPATIENT
Start: 2024-09-09

## 2024-10-07 DIAGNOSIS — E78.00 PURE HYPERCHOLESTEROLEMIA: ICD-10-CM

## 2024-10-07 RX ORDER — PRAVASTATIN SODIUM 20 MG
20 TABLET ORAL NIGHTLY
Qty: 90 TABLET | Refills: 3 | Status: SHIPPED | OUTPATIENT
Start: 2024-10-07

## 2024-10-28 DIAGNOSIS — E11.69 TYPE 2 DIABETES MELLITUS WITH OTHER SPECIFIED COMPLICATION, WITHOUT LONG-TERM CURRENT USE OF INSULIN (HCC): ICD-10-CM

## 2024-10-28 DIAGNOSIS — I10 ESSENTIAL HYPERTENSION, BENIGN: ICD-10-CM

## 2024-10-28 RX ORDER — LOSARTAN POTASSIUM 50 MG/1
50 TABLET ORAL DAILY
Qty: 90 TABLET | Refills: 1 | Status: SHIPPED | OUTPATIENT
Start: 2024-10-28

## 2024-10-28 RX ORDER — HYDROCHLOROTHIAZIDE 12.5 MG/1
12.5 TABLET ORAL DAILY
Qty: 90 TABLET | Refills: 1 | Status: SHIPPED | OUTPATIENT
Start: 2024-10-28

## 2024-11-17 DIAGNOSIS — F41.9 ANXIETY: ICD-10-CM

## 2024-11-17 RX ORDER — ALPRAZOLAM 0.25 MG/1
TABLET ORAL
Qty: 30 TABLET | Refills: 0 | Status: SHIPPED | OUTPATIENT
Start: 2024-11-17 | End: 2024-12-17

## 2025-01-05 DIAGNOSIS — E66.813 CLASS 3 SEVERE OBESITY WITHOUT SERIOUS COMORBIDITY WITH BODY MASS INDEX (BMI) OF 40.0 TO 44.9 IN ADULT, UNSPECIFIED OBESITY TYPE: ICD-10-CM

## 2025-01-05 DIAGNOSIS — E66.01 CLASS 3 SEVERE OBESITY WITHOUT SERIOUS COMORBIDITY WITH BODY MASS INDEX (BMI) OF 40.0 TO 44.9 IN ADULT, UNSPECIFIED OBESITY TYPE: ICD-10-CM

## 2025-01-05 RX ORDER — TIRZEPATIDE 5 MG/.5ML
INJECTION, SOLUTION SUBCUTANEOUS
Qty: 2 ML | Refills: 2 | Status: SHIPPED | OUTPATIENT
Start: 2025-01-05

## 2025-01-22 ENCOUNTER — TELEPHONE (OUTPATIENT)
Age: 65
End: 2025-01-22

## 2025-01-22 DIAGNOSIS — E66.01 CLASS 3 SEVERE OBESITY WITHOUT SERIOUS COMORBIDITY WITH BODY MASS INDEX (BMI) OF 40.0 TO 44.9 IN ADULT, UNSPECIFIED OBESITY TYPE: Primary | ICD-10-CM

## 2025-01-22 DIAGNOSIS — E66.813 CLASS 3 SEVERE OBESITY WITHOUT SERIOUS COMORBIDITY WITH BODY MASS INDEX (BMI) OF 40.0 TO 44.9 IN ADULT, UNSPECIFIED OBESITY TYPE: Primary | ICD-10-CM

## 2025-01-22 RX ORDER — TIRZEPATIDE 7.5 MG/.5ML
7.5 INJECTION, SOLUTION SUBCUTANEOUS WEEKLY
Qty: 2 ML | Refills: 2 | Status: SHIPPED | OUTPATIENT
Start: 2025-01-22

## 2025-02-04 DIAGNOSIS — F41.9 ANXIETY: ICD-10-CM

## 2025-02-04 RX ORDER — ALPRAZOLAM 0.25 MG/1
TABLET ORAL
Qty: 30 TABLET | Refills: 0 | Status: SHIPPED | OUTPATIENT
Start: 2025-02-04 | End: 2025-03-06

## 2025-03-01 DIAGNOSIS — Z00.00 ROUTINE MEDICAL EXAM: ICD-10-CM

## 2025-03-01 DIAGNOSIS — I10 ESSENTIAL HYPERTENSION, BENIGN: ICD-10-CM

## 2025-03-01 DIAGNOSIS — E11.9 TYPE 2 DIABETES MELLITUS WITHOUT COMPLICATION, WITHOUT LONG-TERM CURRENT USE OF INSULIN (HCC): ICD-10-CM

## 2025-03-18 LAB
ALBUMIN SERPL-MCNC: 4.3 G/DL (ref 3.9–4.9)
ALBUMIN/CREAT UR: 5 MG/G CREAT (ref 0–29)
ALP SERPL-CCNC: 89 IU/L (ref 44–121)
ALT SERPL-CCNC: 14 IU/L (ref 0–32)
AST SERPL-CCNC: 15 IU/L (ref 0–40)
BASOPHILS # BLD AUTO: 0.1 X10E3/UL (ref 0–0.2)
BASOPHILS NFR BLD AUTO: 1 %
BILIRUB SERPL-MCNC: 0.4 MG/DL (ref 0–1.2)
BUN SERPL-MCNC: 11 MG/DL (ref 8–27)
BUN/CREAT SERPL: 12 (ref 12–28)
CALCIUM SERPL-MCNC: 9.6 MG/DL (ref 8.7–10.3)
CHLORIDE SERPL-SCNC: 100 MMOL/L (ref 96–106)
CHOLEST SERPL-MCNC: 150 MG/DL (ref 100–199)
CO2 SERPL-SCNC: 27 MMOL/L (ref 20–29)
CREAT SERPL-MCNC: 0.89 MG/DL (ref 0.57–1)
CREAT UR-MCNC: 164.5 MG/DL
EGFRCR SERPLBLD CKD-EPI 2021: 72 ML/MIN/1.73
EOSINOPHIL # BLD AUTO: 0.3 X10E3/UL (ref 0–0.4)
EOSINOPHIL NFR BLD AUTO: 4 %
ERYTHROCYTE [DISTWIDTH] IN BLOOD BY AUTOMATED COUNT: 15.7 % (ref 11.7–15.4)
GLOBULIN SER CALC-MCNC: 2.5 G/DL (ref 1.5–4.5)
GLUCOSE SERPL-MCNC: 119 MG/DL (ref 70–99)
HBA1C MFR BLD: 6 % (ref 4.8–5.6)
HCT VFR BLD AUTO: 43 % (ref 34–46.6)
HDLC SERPL-MCNC: 47 MG/DL
HGB BLD-MCNC: 13.1 G/DL (ref 11.1–15.9)
IMM GRANULOCYTES # BLD AUTO: 0 X10E3/UL (ref 0–0.1)
IMM GRANULOCYTES NFR BLD AUTO: 0 %
LDLC SERPL CALC-MCNC: 79 MG/DL (ref 0–99)
LYMPHOCYTES # BLD AUTO: 1.8 X10E3/UL (ref 0.7–3.1)
LYMPHOCYTES NFR BLD AUTO: 20 %
MCH RBC QN AUTO: 22.9 PG (ref 26.6–33)
MCHC RBC AUTO-ENTMCNC: 30.5 G/DL (ref 31.5–35.7)
MCV RBC AUTO: 75 FL (ref 79–97)
MICROALBUMIN UR-MCNC: 8.9 UG/ML
MONOCYTES # BLD AUTO: 0.8 X10E3/UL (ref 0.1–0.9)
MONOCYTES NFR BLD AUTO: 9 %
NEUTROPHILS # BLD AUTO: 6 X10E3/UL (ref 1.4–7)
NEUTROPHILS NFR BLD AUTO: 66 %
PLATELET # BLD AUTO: 354 X10E3/UL (ref 150–450)
POTASSIUM SERPL-SCNC: 5 MMOL/L (ref 3.5–5.2)
PROT SERPL-MCNC: 6.8 G/DL (ref 6–8.5)
RBC # BLD AUTO: 5.72 X10E6/UL (ref 3.77–5.28)
SODIUM SERPL-SCNC: 141 MMOL/L (ref 134–144)
TRIGL SERPL-MCNC: 138 MG/DL (ref 0–149)
VLDLC SERPL CALC-MCNC: 24 MG/DL (ref 5–40)
WBC # BLD AUTO: 9 X10E3/UL (ref 3.4–10.8)

## 2025-03-23 NOTE — PROGRESS NOTES
Glasses of wine per week    Drug use: Never    Sexual activity: Not Currently     Partners: Male   Other Topics Concern    Not on file   Social History Narrative    Not on file     Social Drivers of Health     Financial Resource Strain: Low Risk  (10/9/2023)    Overall Financial Resource Strain (CARDIA)     Difficulty of Paying Living Expenses: Not hard at all   Food Insecurity: No Food Insecurity (3/24/2025)    Hunger Vital Sign     Worried About Running Out of Food in the Last Year: Never true     Ran Out of Food in the Last Year: Never true   Transportation Needs: No Transportation Needs (3/24/2025)    PRAPARE - Transportation     Lack of Transportation (Medical): No     Lack of Transportation (Non-Medical): No   Physical Activity: Not on file   Stress: Not on file   Social Connections: Not on file   Intimate Partner Violence: Not on file   Housing Stability: Low Risk  (3/24/2025)    Housing Stability Vital Sign     Unable to Pay for Housing in the Last Year: No     Number of Times Moved in the Last Year: 0     Homeless in the Last Year: No        Current Outpatient Medications on File Prior to Visit   Medication Sig Dispense Refill    Probiotic Product (PRO-BIOTIC BLEND PO) Take by mouth      POTASSIUM AMINOBENZOATE PO Take by mouth      Tirzepatide (MOUNJARO) 7.5 MG/0.5ML SOAJ Inject 7.5 mg into the skin once a week 2 mL 2    hydroCHLOROthiazide 12.5 MG tablet TAKE 1 TABLET BY MOUTH DAILY 90 tablet 1    losartan (COZAAR) 50 MG tablet TAKE 1 TABLET BY MOUTH DAILY 90 tablet 1    metFORMIN (GLUCOPHAGE) 1000 MG tablet TAKE 1 TABLET BY MOUTH TWICE DAILY WITH MEALS 180 tablet 1    pravastatin (PRAVACHOL) 20 MG tablet TAKE 1 TABLET BY MOUTH EVERY NIGHT 90 tablet 3    Lactobacillus (PROBIOTIC ACIDOPHILUS PO) Take 1 tablet by mouth daily      Cholecalciferol (VITAMIN D) 50 MCG (2000 UT) CAPS capsule Take 1 capsule by mouth daily      Apremilast (OTEZLA) 30 MG TABS Take 1 tablet by mouth 2 times daily       No current

## 2025-03-24 ENCOUNTER — OFFICE VISIT (OUTPATIENT)
Age: 65
End: 2025-03-24
Payer: COMMERCIAL

## 2025-03-24 VITALS
TEMPERATURE: 97.1 F | BODY MASS INDEX: 37.53 KG/M2 | HEIGHT: 61 IN | SYSTOLIC BLOOD PRESSURE: 117 MMHG | RESPIRATION RATE: 18 BRPM | HEART RATE: 73 BPM | OXYGEN SATURATION: 99 % | WEIGHT: 198.8 LBS | DIASTOLIC BLOOD PRESSURE: 77 MMHG

## 2025-03-24 DIAGNOSIS — E55.9 VITAMIN D DEFICIENCY: ICD-10-CM

## 2025-03-24 DIAGNOSIS — I10 ESSENTIAL HYPERTENSION, BENIGN: ICD-10-CM

## 2025-03-24 DIAGNOSIS — D68.01 VON WILLEBRAND DISEASE, TYPE 1 (HCC): ICD-10-CM

## 2025-03-24 DIAGNOSIS — E11.69 TYPE 2 DIABETES MELLITUS WITH OTHER SPECIFIED COMPLICATION, WITHOUT LONG-TERM CURRENT USE OF INSULIN: ICD-10-CM

## 2025-03-24 DIAGNOSIS — E66.01 CLASS 2 SEVERE OBESITY WITH SERIOUS COMORBIDITY AND BODY MASS INDEX (BMI) OF 37.0 TO 37.9 IN ADULT, UNSPECIFIED OBESITY TYPE: ICD-10-CM

## 2025-03-24 DIAGNOSIS — Z00.00 ROUTINE MEDICAL EXAM: Primary | ICD-10-CM

## 2025-03-24 DIAGNOSIS — E66.812 CLASS 2 SEVERE OBESITY WITH SERIOUS COMORBIDITY AND BODY MASS INDEX (BMI) OF 37.0 TO 37.9 IN ADULT, UNSPECIFIED OBESITY TYPE: ICD-10-CM

## 2025-03-24 PROCEDURE — 99396 PREV VISIT EST AGE 40-64: CPT | Performed by: FAMILY MEDICINE

## 2025-03-24 PROCEDURE — 3074F SYST BP LT 130 MM HG: CPT | Performed by: FAMILY MEDICINE

## 2025-03-24 PROCEDURE — 3078F DIAST BP <80 MM HG: CPT | Performed by: FAMILY MEDICINE

## 2025-03-24 SDOH — ECONOMIC STABILITY: FOOD INSECURITY: WITHIN THE PAST 12 MONTHS, YOU WORRIED THAT YOUR FOOD WOULD RUN OUT BEFORE YOU GOT MONEY TO BUY MORE.: NEVER TRUE

## 2025-03-24 SDOH — ECONOMIC STABILITY: FOOD INSECURITY: WITHIN THE PAST 12 MONTHS, THE FOOD YOU BOUGHT JUST DIDN'T LAST AND YOU DIDN'T HAVE MONEY TO GET MORE.: NEVER TRUE

## 2025-03-24 ASSESSMENT — PATIENT HEALTH QUESTIONNAIRE - PHQ9
SUM OF ALL RESPONSES TO PHQ QUESTIONS 1-9: 0
SUM OF ALL RESPONSES TO PHQ QUESTIONS 1-9: 0
1. LITTLE INTEREST OR PLEASURE IN DOING THINGS: NOT AT ALL
2. FEELING DOWN, DEPRESSED OR HOPELESS: NOT AT ALL
SUM OF ALL RESPONSES TO PHQ QUESTIONS 1-9: 0
SUM OF ALL RESPONSES TO PHQ QUESTIONS 1-9: 0

## 2025-04-23 DIAGNOSIS — E66.813 CLASS 3 SEVERE OBESITY WITHOUT SERIOUS COMORBIDITY WITH BODY MASS INDEX (BMI) OF 40.0 TO 44.9 IN ADULT, UNSPECIFIED OBESITY TYPE (HCC): ICD-10-CM

## 2025-04-23 RX ORDER — TIRZEPATIDE 7.5 MG/.5ML
INJECTION, SOLUTION SUBCUTANEOUS
Qty: 2 ML | Refills: 2 | Status: SHIPPED | OUTPATIENT
Start: 2025-04-23

## 2025-05-13 ENCOUNTER — TELEPHONE (OUTPATIENT)
Age: 65
End: 2025-05-13

## 2025-05-13 DIAGNOSIS — E11.69 TYPE 2 DIABETES MELLITUS WITH OTHER SPECIFIED COMPLICATION, WITHOUT LONG-TERM CURRENT USE OF INSULIN (HCC): ICD-10-CM

## 2025-05-13 DIAGNOSIS — E66.813 CLASS 3 SEVERE OBESITY WITHOUT SERIOUS COMORBIDITY WITH BODY MASS INDEX (BMI) OF 40.0 TO 44.9 IN ADULT, UNSPECIFIED OBESITY TYPE (HCC): Primary | ICD-10-CM

## 2025-05-13 RX ORDER — TIRZEPATIDE 5 MG/.5ML
5 INJECTION, SOLUTION SUBCUTANEOUS WEEKLY
Qty: 2 ML | Refills: 3 | Status: SHIPPED | OUTPATIENT
Start: 2025-05-13

## 2025-05-20 DIAGNOSIS — I10 ESSENTIAL HYPERTENSION, BENIGN: ICD-10-CM

## 2025-05-20 RX ORDER — HYDROCHLOROTHIAZIDE 12.5 MG/1
12.5 TABLET ORAL DAILY
Qty: 90 TABLET | Refills: 1 | Status: SHIPPED | OUTPATIENT
Start: 2025-05-20

## 2025-05-20 RX ORDER — LOSARTAN POTASSIUM 50 MG/1
50 TABLET ORAL DAILY
Qty: 90 TABLET | Refills: 1 | Status: SHIPPED | OUTPATIENT
Start: 2025-05-20

## 2025-06-21 DIAGNOSIS — E11.69 TYPE 2 DIABETES MELLITUS WITH OTHER SPECIFIED COMPLICATION, WITHOUT LONG-TERM CURRENT USE OF INSULIN (HCC): ICD-10-CM

## 2025-07-10 DIAGNOSIS — F41.9 ANXIETY: ICD-10-CM

## 2025-07-10 RX ORDER — ALPRAZOLAM 0.25 MG
TABLET ORAL
Qty: 30 TABLET | Refills: 0 | Status: SHIPPED | OUTPATIENT
Start: 2025-07-10 | End: 2025-08-09

## 2025-07-20 DIAGNOSIS — E78.00 PURE HYPERCHOLESTEROLEMIA: ICD-10-CM

## 2025-07-20 RX ORDER — PRAVASTATIN SODIUM 20 MG
20 TABLET ORAL NIGHTLY
Qty: 90 TABLET | Refills: 3 | Status: SHIPPED | OUTPATIENT
Start: 2025-07-20

## (undated) DEVICE — Device

## (undated) DEVICE — IV START KIT: Brand: MEDLINE

## (undated) DEVICE — CUFF BLD PRSS AD CLTH SGL TB W/ BAYNT CONN ROUNDED CORNER

## (undated) DEVICE — ENDOSCOPIC KIT COMPLIANCE ENDOKIT

## (undated) DEVICE — SET GRAV CK VLV NEEDLESS ST 3 GANGED 4WAY STPCOCK HI FLO 10

## (undated) DEVICE — NEEDLE SCLERO 25GA L240CM OD0.51MM ID0.24MM EXTN L4MM SHTH